# Patient Record
Sex: MALE | Race: WHITE | Employment: FULL TIME | URBAN - METROPOLITAN AREA
[De-identification: names, ages, dates, MRNs, and addresses within clinical notes are randomized per-mention and may not be internally consistent; named-entity substitution may affect disease eponyms.]

---

## 2021-09-17 ENCOUNTER — OFFICE VISIT (OUTPATIENT)
Dept: FAMILY MEDICINE CLINIC | Age: 46
End: 2021-09-17
Payer: COMMERCIAL

## 2021-09-17 ENCOUNTER — HOSPITAL ENCOUNTER (OUTPATIENT)
Age: 46
Discharge: HOME | End: 2021-09-17
Payer: COMMERCIAL

## 2021-09-17 VITALS
HEIGHT: 68 IN | WEIGHT: 287 LBS | TEMPERATURE: 97.3 F | HEART RATE: 74 BPM | OXYGEN SATURATION: 98 % | BODY MASS INDEX: 43.5 KG/M2 | DIASTOLIC BLOOD PRESSURE: 96 MMHG | RESPIRATION RATE: 18 BRPM | SYSTOLIC BLOOD PRESSURE: 148 MMHG

## 2021-09-17 DIAGNOSIS — M25.561 CHRONIC PAIN OF RIGHT KNEE: ICD-10-CM

## 2021-09-17 DIAGNOSIS — G89.29 CHRONIC PAIN OF RIGHT KNEE: ICD-10-CM

## 2021-09-17 DIAGNOSIS — Z11.4 SCREENING FOR HUMAN IMMUNODEFICIENCY VIRUS: ICD-10-CM

## 2021-09-17 DIAGNOSIS — Z11.59 NEED FOR HEPATITIS C SCREENING TEST: ICD-10-CM

## 2021-09-17 DIAGNOSIS — L30.9 ECZEMA, UNSPECIFIED TYPE: ICD-10-CM

## 2021-09-17 DIAGNOSIS — I10 ESSENTIAL HYPERTENSION: ICD-10-CM

## 2021-09-17 DIAGNOSIS — I10 ESSENTIAL HYPERTENSION: Primary | ICD-10-CM

## 2021-09-17 LAB
ALBUMIN SERPL-MCNC: 4.2 G/DL (ref 3.5–5.2)
ALP BLD-CCNC: 85 U/L (ref 40–129)
ALT SERPL-CCNC: 43 U/L (ref 0–40)
ANION GAP SERPL CALCULATED.3IONS-SCNC: 9 MMOL/L (ref 7–16)
AST SERPL-CCNC: 35 U/L (ref 0–39)
BASOPHILS ABSOLUTE: 0.04 E9/L (ref 0–0.2)
BASOPHILS RELATIVE PERCENT: 0.5 % (ref 0–2)
BILIRUB SERPL-MCNC: <0.2 MG/DL (ref 0–1.2)
BUN BLDV-MCNC: 13 MG/DL (ref 6–20)
CALCIUM SERPL-MCNC: 9 MG/DL (ref 8.6–10.2)
CHLORIDE BLD-SCNC: 106 MMOL/L (ref 98–107)
CHOLESTEROL, TOTAL: 103 MG/DL (ref 0–199)
CO2: 25 MMOL/L (ref 22–29)
CREAT SERPL-MCNC: 0.8 MG/DL (ref 0.7–1.2)
EOSINOPHILS ABSOLUTE: 0.28 E9/L (ref 0.05–0.5)
EOSINOPHILS RELATIVE PERCENT: 3.7 % (ref 0–6)
GFR AFRICAN AMERICAN: >60
GFR NON-AFRICAN AMERICAN: >60 ML/MIN/1.73
GLUCOSE BLD-MCNC: 98 MG/DL (ref 74–99)
HBA1C MFR BLD: 5.3 % (ref 4–5.6)
HCT VFR BLD CALC: 45.1 % (ref 37–54)
HDLC SERPL-MCNC: 44 MG/DL
HEMOGLOBIN: 15 G/DL (ref 12.5–16.5)
IMMATURE GRANULOCYTES #: 0.03 E9/L
IMMATURE GRANULOCYTES %: 0.4 % (ref 0–5)
LDL CHOLESTEROL CALCULATED: 42 MG/DL (ref 0–99)
LYMPHOCYTES ABSOLUTE: 1.94 E9/L (ref 1.5–4)
LYMPHOCYTES RELATIVE PERCENT: 25.8 % (ref 20–42)
MCH RBC QN AUTO: 32.1 PG (ref 26–35)
MCHC RBC AUTO-ENTMCNC: 33.3 % (ref 32–34.5)
MCV RBC AUTO: 96.6 FL (ref 80–99.9)
MONOCYTES ABSOLUTE: 1.04 E9/L (ref 0.1–0.95)
MONOCYTES RELATIVE PERCENT: 13.8 % (ref 2–12)
NEUTROPHILS ABSOLUTE: 4.19 E9/L (ref 1.8–7.3)
NEUTROPHILS RELATIVE PERCENT: 55.8 % (ref 43–80)
PDW BLD-RTO: 13.7 FL (ref 11.5–15)
PLATELET # BLD: 261 E9/L (ref 130–450)
PMV BLD AUTO: 10.3 FL (ref 7–12)
POTASSIUM SERPL-SCNC: 4 MMOL/L (ref 3.5–5)
RBC # BLD: 4.67 E12/L (ref 3.8–5.8)
SODIUM BLD-SCNC: 140 MMOL/L (ref 132–146)
TOTAL PROTEIN: 7.1 G/DL (ref 6.4–8.3)
TRIGL SERPL-MCNC: 85 MG/DL (ref 0–149)
VLDLC SERPL CALC-MCNC: 17 MG/DL
WBC # BLD: 7.5 E9/L (ref 4.5–11.5)

## 2021-09-17 PROCEDURE — 80061 LIPID PANEL: CPT

## 2021-09-17 PROCEDURE — 83036 HEMOGLOBIN GLYCOSYLATED A1C: CPT

## 2021-09-17 PROCEDURE — 86803 HEPATITIS C AB TEST: CPT

## 2021-09-17 PROCEDURE — 36415 COLL VENOUS BLD VENIPUNCTURE: CPT

## 2021-09-17 PROCEDURE — 85025 COMPLETE CBC W/AUTO DIFF WBC: CPT

## 2021-09-17 PROCEDURE — 99204 OFFICE O/P NEW MOD 45 MIN: CPT | Performed by: FAMILY MEDICINE

## 2021-09-17 PROCEDURE — G8427 DOCREV CUR MEDS BY ELIG CLIN: HCPCS | Performed by: FAMILY MEDICINE

## 2021-09-17 PROCEDURE — 1036F TOBACCO NON-USER: CPT | Performed by: FAMILY MEDICINE

## 2021-09-17 PROCEDURE — G8417 CALC BMI ABV UP PARAM F/U: HCPCS | Performed by: FAMILY MEDICINE

## 2021-09-17 PROCEDURE — 86703 HIV-1/HIV-2 1 RESULT ANTBDY: CPT

## 2021-09-17 PROCEDURE — 80053 COMPREHEN METABOLIC PANEL: CPT

## 2021-09-17 RX ORDER — CLOBETASOL PROPIONATE 0.5 MG/ML
LOTION TOPICAL
Qty: 118 ML | Refills: 3 | Status: SHIPPED
Start: 2021-09-17 | End: 2021-09-17 | Stop reason: SDUPTHER

## 2021-09-17 RX ORDER — CLOBETASOL PROPIONATE 0.5 MG/G
AEROSOL, FOAM TOPICAL
Qty: 100 G | Refills: 3 | Status: SHIPPED
Start: 2021-09-17 | End: 2021-09-17 | Stop reason: SDUPTHER

## 2021-09-17 RX ORDER — CLOBETASOL PROPIONATE 0.5 MG/ML
LOTION TOPICAL
Qty: 118 ML | Refills: 3 | Status: SHIPPED
Start: 2021-09-17 | End: 2022-01-05

## 2021-09-17 RX ORDER — LISINOPRIL 40 MG/1
40 TABLET ORAL DAILY
COMMUNITY
End: 2021-09-17

## 2021-09-17 RX ORDER — ASPIRIN 81 MG/1
81 TABLET ORAL DAILY
COMMUNITY

## 2021-09-17 RX ORDER — HYDROCHLOROTHIAZIDE 25 MG/1
25 TABLET ORAL DAILY
COMMUNITY
End: 2021-09-17

## 2021-09-17 RX ORDER — CLOBETASOL PROPIONATE 0.5 MG/G
AEROSOL, FOAM TOPICAL
Qty: 100 G | Refills: 3 | Status: SHIPPED
Start: 2021-09-17 | End: 2022-03-24

## 2021-09-17 RX ORDER — ATORVASTATIN CALCIUM 10 MG/1
10 TABLET, FILM COATED ORAL DAILY
COMMUNITY
End: 2021-10-04 | Stop reason: SDUPTHER

## 2021-09-17 RX ORDER — LISINOPRIL AND HYDROCHLOROTHIAZIDE 25; 20 MG/1; MG/1
1 TABLET ORAL 2 TIMES DAILY
Qty: 180 TABLET | Refills: 1 | Status: SHIPPED
Start: 2021-09-17 | End: 2021-11-22

## 2021-09-17 RX ORDER — LISINOPRIL AND HYDROCHLOROTHIAZIDE 25; 20 MG/1; MG/1
1 TABLET ORAL 2 TIMES DAILY
Qty: 180 TABLET | Refills: 1 | Status: SHIPPED
Start: 2021-09-17 | End: 2021-09-17 | Stop reason: SDUPTHER

## 2021-09-17 SDOH — ECONOMIC STABILITY: FOOD INSECURITY: WITHIN THE PAST 12 MONTHS, YOU WORRIED THAT YOUR FOOD WOULD RUN OUT BEFORE YOU GOT MONEY TO BUY MORE.: NEVER TRUE

## 2021-09-17 SDOH — ECONOMIC STABILITY: FOOD INSECURITY: WITHIN THE PAST 12 MONTHS, THE FOOD YOU BOUGHT JUST DIDN'T LAST AND YOU DIDN'T HAVE MONEY TO GET MORE.: NEVER TRUE

## 2021-09-17 ASSESSMENT — SOCIAL DETERMINANTS OF HEALTH (SDOH): HOW HARD IS IT FOR YOU TO PAY FOR THE VERY BASICS LIKE FOOD, HOUSING, MEDICAL CARE, AND HEATING?: NOT HARD AT ALL

## 2021-09-17 ASSESSMENT — PATIENT HEALTH QUESTIONNAIRE - PHQ9
2. FEELING DOWN, DEPRESSED OR HOPELESS: 0
SUM OF ALL RESPONSES TO PHQ QUESTIONS 1-9: 0
1. LITTLE INTEREST OR PLEASURE IN DOING THINGS: 0
SUM OF ALL RESPONSES TO PHQ QUESTIONS 1-9: 0
SUM OF ALL RESPONSES TO PHQ9 QUESTIONS 1 & 2: 0
SUM OF ALL RESPONSES TO PHQ QUESTIONS 1-9: 0

## 2021-09-17 ASSESSMENT — ENCOUNTER SYMPTOMS
SORE THROAT: 0
CHEST TIGHTNESS: 0
CONSTIPATION: 0
NAUSEA: 0
VOMITING: 0
DIARRHEA: 0
BACK PAIN: 0
WHEEZING: 0
COUGH: 0
SHORTNESS OF BREATH: 0
TROUBLE SWALLOWING: 0

## 2021-09-17 NOTE — PROGRESS NOTES
[address]  Precepting Note    Subjective:  new pt    HTN  Elevated today  Compliant with meds    Eczema  Used to be clobetasol but was on MTX while in care home    Remote T8 fx    ROS otherwise negative     Past medical, surgical, family and social history were reviewed, non-contributory, and unchanged unless otherwise stated. Objective:    BP (!) 148/96   Pulse 74   Temp 97.3 °F (36.3 °C) (Temporal)   Resp 18   Ht 5' 8\" (1.727 m)   Wt 287 lb (130.2 kg)   SpO2 98%   BMI 43.64 kg/m²     Exam is as noted by resident with the following changes, additions or corrections:    General:  NAD; alert & oriented x 3   Heart:  RRR, no murmurs, gallops, or rubs. Lungs:  CTA bilaterally, no wheeze, rales or rhonchi  Abd: bowel sounds present, nontender, nondistended, no masses  Extrem:  No clubbing, cyanosis, or edema    Assessment/Plan:  HTN   Change to Prinzide bid    Labs   Eczema    Restart clobetasol  HM   Check labs  RTO    2-3 mo/prn      Attending Physician Statement  I have reviewed the chart, including any radiology or labs. I have discussed the case, including pertinent history and exam findings with the resident. I agree with the assessment, plan and orders as documented by the resident. Please refer to the resident note for additional information.       Electronically signed by Eliezer Olivera MD on 9/17/2021 at 10:26 AM

## 2021-09-17 NOTE — PROGRESS NOTES
9/17/2021    Jennifer Bustos is a 51 y.o. male here for:    HPI:    Establish:    History of T8 Compression fracture, Ankle Surgery     HTN: On Lisinopril and HCTZ. Has been tolerating them. No CP or SOB. No vision issues. Having a lot of anxiety and stress about being back in general life after incarceration. Eczema: Was on MTX when inside. Used to be on Clobetasol topicals with very good relief. Uses lotion on skin and foam on scalp. Worse on the back right now. Right Knee Pain: Has been somewhat worse since gaining weight. Occasional popping feeling. No instability or weakness. Pain is minimal and tolerable. Would like a knee sleeve. Screening: Due for labs. Meets new Colon Ca screening guidelines. Wants to think about it, leaning toward CScope. COVID Vaccine Status: 3 doses Moderna  BP Readings from Last 3 Encounters:   09/17/21 (!) 148/96   08/02/11 126/80     Current Outpatient Medications   Medication Sig Dispense Refill    aspirin 81 MG EC tablet Take 81 mg by mouth daily      atorvastatin (LIPITOR) 10 MG tablet Take 10 mg by mouth daily      lisinopril-hydroCHLOROthiazide (PRINZIDE;ZESTORETIC) 20-25 MG per tablet Take 1 tablet by mouth 2 times daily 180 tablet 1    clobetasol propionate 0.05 % LOTN lotion Apply topically to affected area 2 times daily 118 mL 3    clobetasol (OLUX) 0.05 % foam Apply topically to scalp 2 times daily. 100 g 3    Elastic Bandages & Supports (NEOPRENE KNEE BRACE) MISC Apply to right knee daily 1 each 0     No current facility-administered medications for this visit.       Allergies   Allergen Reactions    Morphine Nausea And Vomiting    Penicillins Rash       Past Medical & Surgical History:      Diagnosis Date    Chronic back pain 8/2/2011    Eczema     Essential hypertension      Past Surgical History:   Procedure Laterality Date    ANKLE SURGERY  2009    ORIF on Rt ankle       Family History:      Problem Relation Age of Onset    High Blood Pressure Mother     Diabetes Father     Heart Disease Father     High Blood Pressure Father     COPD Father     Diabetes Brother     Diabetes Paternal Grandmother     Cancer Paternal Grandmother         Unknown    Prostate Cancer Paternal Grandfather        Social History:  Social History     Tobacco Use    Smoking status: Former Smoker     Packs/day: 2.00     Years: 7.00     Pack years: 14.00     Types: Cigarettes     Quit date: 2011     Years since quitting: 10.7    Smokeless tobacco: Never Used   Substance Use Topics    Alcohol use: No     Comment: Quit since 2007       Immunization History   Administered Date(s) Administered    COVID-19, Moderna, PF, 100mcg/0.5mL 03/24/2021, 04/20/2021, 09/15/2021       Review of Systems   Constitutional: Negative for chills and fever. HENT: Negative for sore throat and trouble swallowing. Respiratory: Negative for cough, chest tightness, shortness of breath and wheezing. Cardiovascular: Negative for chest pain, palpitations and leg swelling. Gastrointestinal: Negative for constipation, diarrhea, nausea and vomiting. Musculoskeletal: Positive for arthralgias (Right knee). Negative for back pain, gait problem and neck pain. Skin: Negative for wound. Neurological: Negative for dizziness, light-headedness and headaches. Psychiatric/Behavioral: The patient is not nervous/anxious. VS:  BP (!) 148/96   Pulse 74   Temp 97.3 °F (36.3 °C) (Temporal)   Resp 18   Ht 5' 8\" (1.727 m)   Wt 287 lb (130.2 kg)   SpO2 98%   BMI 43.64 kg/m²     Physical Exam  Vitals reviewed. Constitutional:       General: He is not in acute distress. Appearance: Normal appearance. He is obese. He is not ill-appearing. HENT:      Head: Normocephalic and atraumatic. Cardiovascular:      Rate and Rhythm: Normal rate and regular rhythm. Pulses: Normal pulses. Heart sounds: Normal heart sounds. No murmur heard.      Pulmonary:      Effort: Pulmonary effort is normal. No respiratory distress. Breath sounds: No wheezing, rhonchi or rales. Musculoskeletal:         General: No swelling. Normal range of motion. Cervical back: Normal range of motion. No rigidity. Right knee: Crepitus (Mild) present. No effusion or bony tenderness. Normal range of motion. No tenderness. Right lower leg: No edema. Left lower leg: No edema. Skin:     General: Skin is warm and dry. Neurological:      General: No focal deficit present. Mental Status: He is alert. Motor: No weakness. Assessment/Plan:    1. Essential hypertension  Elevated in office today  REcheckd and improved  Will increase HCTZ, Prinzide BID  Check labs  - lisinopril-hydroCHLOROthiazide (PRINZIDE;ZESTORETIC) 20-25 MG per tablet; Take 1 tablet by mouth 2 times daily  Dispense: 180 tablet; Refill: 1  - COMPREHENSIVE METABOLIC PANEL; Future  - LIPID PANEL; Future  - HEMOGLOBIN A1C; Future  - CBC Auto Differential; Future    2. Eczema, unspecified type  Restart topicals  Consider Derm referral if he would like if no response  Previously on Enbrel  - clobetasol propionate 0.05 % LOTN lotion; Apply topically to affected area 2 times daily  Dispense: 118 mL; Refill: 3  - clobetasol (OLUX) 0.05 % foam; Apply topically to scalp 2 times daily. Dispense: 100 g; Refill: 3    3. BMI 40.0-44.9, adult ([facility] [address]  Counseled on weight  He is planning to increase activity now that he has a new job  - LIPID PANEL; Future  - HEMOGLOBIN A1C; Future    4. Need for hepatitis C screening test  Screening  - HEPATITIS C ANTIBODY; Future    5. Screening for human immunodeficiency virus  Screening  Recent incarceration but no specific risk factors  - HIV Screen; Future    6. Chronic pain of right knee  Use sleeve  If worsening will consider imaging  Given exercises for knee  - Elastic Bandages & Supports (NEOPRENE KNEE BRACE) MISC; Apply to right knee daily  Dispense: 1 each;  Refill: 0      Follow up:  2 months    Patient agrees with the above stated plan.       Lauren Cheema MD  PGY-3 Family Medicine

## 2021-09-17 NOTE — PATIENT INSTRUCTIONS
about 12 inches off the floor. Hold for about 6 seconds, then slowly lower your leg. 5. Do 8 to 12 repetitions. Straight-leg raises to the back   1. Lie on your stomach, and lift your leg straight up behind you (toward the ceiling). 2. Lift your toes about 6 inches off the floor, hold for about 6 seconds, then lower slowly. 3. Do 8 to 12 repetitions. Straight-leg raises to the inside   1. Lie on the side of your body with the injured leg. 2. You can either prop your other (good) leg up on a chair, or you can bend your good knee and put that foot in front of your injured knee. Do not drop your hip back. 3. Tighten the muscles on the front of your thigh to straighten your injured knee. 4. Keep your kneecap pointing forward, and lift your whole leg up toward the ceiling about 6 inches. Hold for about 6 seconds, then lower slowly. 5. Do 8 to 12 repetitions. Heel dig bridging   1. Lie on your back with both knees bent and your ankles bent so that only your heels are digging into the floor. Your knees should be bent about 90 degrees. 2. Then push your heels into the floor, squeeze your buttocks, and lift your hips off the floor until your shoulders, hips, and knees are all in a straight line. 3. Hold for about 6 seconds as you continue to breathe normally, and then slowly lower your hips back down to the floor and rest for up to 10 seconds. 4. Do 8 to 12 repetitions. Hamstring curls   1. Lie on your stomach with your knees straight. If your kneecap is uncomfortable, roll up a washcloth and put it under your leg just above your kneecap. 2. Lift the foot of your injured leg by bending the knee so that you bring the foot up toward your buttock. If this motion hurts, try it without bending your knee quite as far. This may help you avoid any painful motion. 3. Slowly lower your leg back to the floor. 4. Do 8 to 12 repetitions.   5. With permission from your doctor or physical therapist, you may also want to add a cuff weight to your ankle (not more than 5 pounds). With weight, you do not have to lift your leg more than 12 inches to get a hamstring workout. Shallow standing knee bends   Do this exercise only if you have very little pain; if you have no clicking, locking, or giving way if you have an injured knee; and if it does not hurt while you are doing 8 to 12 repetitions. 1. Stand with your hands lightly resting on a counter or chair in front of you. Put your feet shoulder-width apart. 2. Slowly bend your knees so that you squat down like you are going to sit in a chair. Make sure your knees do not go in front of your toes. 3. Lower yourself about 6 inches. Your heels should remain on the floor at all times. 4. Rise slowly to a standing position. Heel raises   1. Stand with your feet a few inches apart, with your hands lightly resting on a counter or chair in front of you. 2. Slowly raise your heels off the floor while keeping your knees straight. 3. Hold for about 6 seconds, then slowly lower your heels to the floor. 4. Do 8 to 12 repetitions several times during the day. Follow-up care is a key part of your treatment and safety. Be sure to make and go to all appointments, and call your doctor if you are having problems. It's also a good idea to know your test results and keep a list of the medicines you take. Where can you learn more? Go to [email or web address] and sign in to your [employer] account. Enter W641 in the [facility] box to learn more about \"Knee: Exercises. \"     If you do not have an account, please click on the \"Sign Up Now\" link. Current as of: November 16, 2020               Content Version: 12.9  © 7565-3144 [employer]. Care instructions adapted under license by [employer].  If you have questions about a medical condition or this instruction, always ask your healthcare professional. Javed Hernandez any warranty or liability for your use of this information.

## 2021-09-20 LAB
HEPATITIS C ANTIBODY INTERPRETATION: NORMAL
HIV-1 AND HIV-2 ANTIBODIES: NORMAL

## 2021-10-04 ENCOUNTER — TELEPHONE (OUTPATIENT)
Dept: FAMILY MEDICINE CLINIC | Age: 46
End: 2021-10-04

## 2021-10-04 ENCOUNTER — OFFICE VISIT (OUTPATIENT)
Dept: PRIMARY CARE CLINIC | Age: 46
End: 2021-10-04
Payer: COMMERCIAL

## 2021-10-04 VITALS
BODY MASS INDEX: 40.92 KG/M2 | HEIGHT: 68 IN | WEIGHT: 270 LBS | SYSTOLIC BLOOD PRESSURE: 142 MMHG | OXYGEN SATURATION: 95 % | HEART RATE: 87 BPM | DIASTOLIC BLOOD PRESSURE: 98 MMHG | TEMPERATURE: 97.5 F

## 2021-10-04 DIAGNOSIS — J02.9 PHARYNGITIS, UNSPECIFIED ETIOLOGY: Primary | ICD-10-CM

## 2021-10-04 LAB — S PYO AG THROAT QL: NORMAL

## 2021-10-04 PROCEDURE — 99212 OFFICE O/P EST SF 10 MIN: CPT | Performed by: STUDENT IN AN ORGANIZED HEALTH CARE EDUCATION/TRAINING PROGRAM

## 2021-10-04 PROCEDURE — 87880 STREP A ASSAY W/OPTIC: CPT | Performed by: STUDENT IN AN ORGANIZED HEALTH CARE EDUCATION/TRAINING PROGRAM

## 2021-10-04 RX ORDER — ATORVASTATIN CALCIUM 10 MG/1
10 TABLET, FILM COATED ORAL DAILY
Qty: 30 TABLET | Refills: 0 | Status: SHIPPED
Start: 2021-10-04 | End: 2021-11-22 | Stop reason: SDUPTHER

## 2021-10-04 RX ORDER — DOXYCYCLINE HYCLATE 100 MG
100 TABLET ORAL 2 TIMES DAILY
Qty: 14 TABLET | Refills: 0 | Status: SHIPPED | OUTPATIENT
Start: 2021-10-04 | End: 2021-10-11

## 2021-10-04 NOTE — TELEPHONE ENCOUNTER
Pt called in stating he believes he has strep throat. Red spots in his throat and difficulty swallowing. Was asking if Dr can call medication in to help him fight this.          [facility] on [address] [place] [address]

## 2021-10-04 NOTE — LETTER
[address] [facility] [place]  [address] [place] [address]  Phone: [phone]  Fax: [phone]    Lulu Núñez MD        October 4, 2021     Patient: Bj Archer   YOB: 1975   Date of Visit: 10/4/2021       To Whom It May Concern: It is my medical opinion that Bj Archer should be excused from work on 10/4/2021 and may return 10/5/2021. If you have any questions or concerns, please don't hesitate to call.     Sincerely,        Lulu Núñez MD

## 2021-10-04 NOTE — TELEPHONE ENCOUNTER
Agree with walk in thank you.     Electronically signed by Patrice Cabrera MD on 10/4/2021 at 12:46 PM

## 2021-10-05 NOTE — PROGRESS NOTES
Chief Complaint       Pharyngitis (x 3 days)      History of Present Illness   Source of history provided by:  patient. Lennox Banegas is a 51 y.o. old male presenting to the walk in clinic for evaluation of sore throat for 3 days. Reports throat is now so painful that it is difficult to swallow. Has been drinking plenty of fluids. Felt feverish last night but did not take his temperature. Taking ibuprofen. Denies congestion, cough, SOA. Denies contact with individuals with known COVID-19 infection or under investigation for COVID-19 infection. Pt has been vaccinated for COVID-19. ROS    Unless otherwise stated in this report or unable to obtain because of the patient's clinical or mental status as evidenced by the medical record, this patients's positive and negative responses for Review of Systems, constitutional, psych, eyes, ENT, cardiovascular, respiratory, gastrointestinal, neurological, genitourinary, musculoskeletal, integument systems and systems related to the presenting problem are either stated in the preceding or were not pertinent or were negative for the symptoms and/or complaints related to the medical problem. Past Medical History:  has a past medical history of Chronic back pain, Eczema, and Essential hypertension. Past Surgical History:  has a past surgical history that includes Ankle surgery (2009). Social History:  reports that he quit smoking about 10 years ago. His smoking use included cigarettes. He has a 14.00 pack-year smoking history. He has never used smokeless tobacco. He reports previous drug use. He reports that he does not drink alcohol. Family History: family history includes COPD in his father; Cancer in his paternal grandmother; Diabetes in his brother, father, and paternal grandmother; Heart Disease in his father; High Blood Pressure in his father and mother; Prostate Cancer in his paternal grandfather.    Allergies: Morphine and Penicillins    Physical Exam VS:  BP (!) 142/98   Pulse 87   Temp 97.5 °F (36.4 °C)   Ht 5' 8\" (1.727 m)   Wt 270 lb (122.5 kg)   SpO2 95%   BMI 41.05 kg/m²    Oxygen Saturation Interpretation: Normal.    Constitutional:  Alert, development consistent with age. Ears:  External Ears: Bilateral pinna normal. TMs  without erythema or perforation bilaterally. Canals normal bilaterally without swelling or exudate  Nose:  no congestion of the nasal mucosa. There is no injection to middle turbinates bilaterally. Throat:  posterior pharyngeal erythema with bilateral exudates  Neck:  Supple. There is anterior cervical adenopathy. Lungs: CTAB without wheezes, rales, or rhonchi  Heart:  Regular rate and rhythm, normal heart sounds, without pathological murmurs, ectopy, gallops, or rubs. Skin:  Normal turgor. Warm, dry, without visible rash. Neurological:  Alert and oriented. Motor functions intact. Responds to verbal commands. Lab / Imaging Results   (All laboratory and radiology results have been personally reviewed by myself)  Labs:  Results for orders placed or performed in visit on 10/04/21   POCT rapid strep A   Result Value Ref Range    Strep A Ag None Detected None Detected       Imaging: All Radiology results interpreted by Radiologist unless otherwise noted. Assessment / Plan     Impression(s):  Rhea Beverly was seen today for pharyngitis. Diagnoses and all orders for this visit:    Pharyngitis, unspecified etiology  -     POCT rapid strep A  -     doxycycline hyclate (VIBRA-TABS) 100 MG tablet; Take 1 tablet by mouth 2 times daily for 7 days  -     Lidocaine 4 % SOLN; Apply 1 spray topically three times daily    Other orders  -     atorvastatin (LIPITOR) 10 MG tablet; Take 1 tablet by mouth daily      Raid strep negative but with physical exam findings and history, will treat for strep pharyngitis. Patient with PCN allergy. Disposition:  Disposition: Discharge to home    Pt advised that he likely has strep. Increase fluids and rest. Script written for doxy and lidocaine throat spray; side effects discussed. Additional symptomatic relief discussed. PCP in 5-7 days if symptoms persist. ED sooner if symptoms worsen or change. Red flag symptoms discussed. Pt is in agreement with this care plan. All questions answered.     Rosanna Escoto MD

## 2021-10-08 VITALS
HEART RATE: 81 BPM | HEIGHT: 69 IN | RESPIRATION RATE: 18 BRPM | TEMPERATURE: 97.9 F | SYSTOLIC BLOOD PRESSURE: 131 MMHG | DIASTOLIC BLOOD PRESSURE: 60 MMHG | OXYGEN SATURATION: 95 % | BODY MASS INDEX: 40.73 KG/M2 | WEIGHT: 275 LBS

## 2021-10-08 ASSESSMENT — PAIN SCALES - GENERAL: PAINLEVEL_OUTOF10: 8

## 2021-10-08 ASSESSMENT — PAIN DESCRIPTION - ONSET: ONSET: ON-GOING

## 2021-10-08 ASSESSMENT — PAIN DESCRIPTION - PROGRESSION: CLINICAL_PROGRESSION: GRADUALLY WORSENING

## 2021-10-08 ASSESSMENT — PAIN DESCRIPTION - PAIN TYPE: TYPE: ACUTE PAIN

## 2021-10-08 ASSESSMENT — PAIN DESCRIPTION - FREQUENCY: FREQUENCY: CONTINUOUS

## 2021-10-08 ASSESSMENT — PAIN DESCRIPTION - LOCATION: LOCATION: ABDOMEN

## 2021-10-08 ASSESSMENT — PAIN DESCRIPTION - ORIENTATION: ORIENTATION: LEFT;UPPER

## 2021-10-08 ASSESSMENT — PAIN DESCRIPTION - DESCRIPTORS: DESCRIPTORS: SORE;ACHING

## 2021-10-09 ENCOUNTER — HOSPITAL ENCOUNTER (EMERGENCY)
Age: 46
Discharge: LEFT BEFORE DISCHARGE | End: 2021-10-09
Payer: COMMERCIAL

## 2021-10-09 ENCOUNTER — HOSPITAL ENCOUNTER (EMERGENCY)
Age: 46
Discharge: LEFT BEFORE DISCHARGE | End: 2021-10-09
Attending: STUDENT IN AN ORGANIZED HEALTH CARE EDUCATION/TRAINING PROGRAM
Payer: COMMERCIAL

## 2021-10-09 VITALS
HEIGHT: 69 IN | HEART RATE: 85 BPM | DIASTOLIC BLOOD PRESSURE: 86 MMHG | TEMPERATURE: 98.8 F | RESPIRATION RATE: 16 BRPM | WEIGHT: 275 LBS | SYSTOLIC BLOOD PRESSURE: 152 MMHG | BODY MASS INDEX: 40.73 KG/M2 | OXYGEN SATURATION: 91 %

## 2021-10-09 LAB
ANION GAP SERPL CALCULATED.3IONS-SCNC: 3 MMOL/L (ref 7–16)
BASOPHILS ABSOLUTE: 0.05 E9/L (ref 0–0.2)
BASOPHILS RELATIVE PERCENT: 0.5 % (ref 0–2)
BILIRUBIN URINE: NEGATIVE
BLOOD, URINE: NEGATIVE
BUN BLDV-MCNC: 36 MG/DL (ref 6–20)
CALCIUM SERPL-MCNC: 9.4 MG/DL (ref 8.6–10.2)
CHLORIDE BLD-SCNC: 99 MMOL/L (ref 98–107)
CLARITY: CLEAR
CO2: 33 MMOL/L (ref 22–29)
COLOR: YELLOW
CREAT SERPL-MCNC: 0.9 MG/DL (ref 0.7–1.2)
EOSINOPHILS ABSOLUTE: 0.2 E9/L (ref 0.05–0.5)
EOSINOPHILS RELATIVE PERCENT: 2.2 % (ref 0–6)
GFR AFRICAN AMERICAN: >60
GFR NON-AFRICAN AMERICAN: >60 ML/MIN/1.73
GLUCOSE BLD-MCNC: 132 MG/DL (ref 74–99)
GLUCOSE URINE: NEGATIVE MG/DL
HCT VFR BLD CALC: 47.3 % (ref 37–54)
HEMOGLOBIN: 15.8 G/DL (ref 12.5–16.5)
IMMATURE GRANULOCYTES #: 0.03 E9/L
IMMATURE GRANULOCYTES %: 0.3 % (ref 0–5)
KETONES, URINE: NEGATIVE MG/DL
LACTIC ACID: 0.8 MMOL/L (ref 0.5–2.2)
LEUKOCYTE ESTERASE, URINE: NEGATIVE
LIPASE: 21 U/L (ref 13–60)
LYMPHOCYTES ABSOLUTE: 2.76 E9/L (ref 1.5–4)
LYMPHOCYTES RELATIVE PERCENT: 29.8 % (ref 20–42)
MCH RBC QN AUTO: 31.5 PG (ref 26–35)
MCHC RBC AUTO-ENTMCNC: 33.4 % (ref 32–34.5)
MCV RBC AUTO: 94.2 FL (ref 80–99.9)
MONOCYTES ABSOLUTE: 0.65 E9/L (ref 0.1–0.95)
MONOCYTES RELATIVE PERCENT: 7 % (ref 2–12)
NEUTROPHILS ABSOLUTE: 5.58 E9/L (ref 1.8–7.3)
NEUTROPHILS RELATIVE PERCENT: 60.2 % (ref 43–80)
NITRITE, URINE: NEGATIVE
PDW BLD-RTO: 13.2 FL (ref 11.5–15)
PH UA: 5 (ref 5–9)
PLATELET # BLD: 316 E9/L (ref 130–450)
PMV BLD AUTO: 9.8 FL (ref 7–12)
POTASSIUM SERPL-SCNC: 4.5 MMOL/L (ref 3.5–5)
PROTEIN UA: NEGATIVE MG/DL
RBC # BLD: 5.02 E12/L (ref 3.8–5.8)
SODIUM BLD-SCNC: 135 MMOL/L (ref 132–146)
SPECIFIC GRAVITY UA: 1.02 (ref 1–1.03)
UROBILINOGEN, URINE: 0.2 E.U./DL
WBC # BLD: 9.3 E9/L (ref 4.5–11.5)

## 2021-10-09 PROCEDURE — 83690 ASSAY OF LIPASE: CPT

## 2021-10-09 PROCEDURE — 83605 ASSAY OF LACTIC ACID: CPT

## 2021-10-09 PROCEDURE — 80048 BASIC METABOLIC PNL TOTAL CA: CPT

## 2021-10-09 PROCEDURE — 4500000002 HC ER NO CHARGE

## 2021-10-09 PROCEDURE — 99283 EMERGENCY DEPT VISIT LOW MDM: CPT

## 2021-10-09 PROCEDURE — 36415 COLL VENOUS BLD VENIPUNCTURE: CPT

## 2021-10-09 PROCEDURE — 81003 URINALYSIS AUTO W/O SCOPE: CPT

## 2021-10-09 PROCEDURE — 87088 URINE BACTERIA CULTURE: CPT

## 2021-10-09 PROCEDURE — 85025 COMPLETE CBC W/AUTO DIFF WBC: CPT

## 2021-10-09 RX ORDER — SODIUM CHLORIDE 0.9 % (FLUSH) 0.9 %
10 SYRINGE (ML) INJECTION PRN
Status: DISCONTINUED | OUTPATIENT
Start: 2021-10-09 | End: 2021-10-09 | Stop reason: HOSPADM

## 2021-10-09 ASSESSMENT — PAIN SCALES - GENERAL: PAINLEVEL_OUTOF10: 8

## 2021-10-09 ASSESSMENT — PAIN DESCRIPTION - ORIENTATION: ORIENTATION: LEFT;UPPER

## 2021-10-09 ASSESSMENT — PAIN DESCRIPTION - LOCATION: LOCATION: ABDOMEN

## 2021-10-09 ASSESSMENT — PAIN DESCRIPTION - DESCRIPTORS: DESCRIPTORS: SHARP;CONSTANT

## 2021-10-09 NOTE — ED NOTES
[address] CONTACT ASSESSMENT NOTE       Department of Emergency Medicine   10/8/21  10:20 PM EDT    Chief Complaint: Abdominal Pain (LUQ, radiating to left flank)    History of Present Illness:   Ruthie Bull is a 51 y.o. male who presents to the ED for left flank pain that began a couple days ago. He states the pain radiates into LUQ of abdomen as well. He does report nausea and vomiting. He denies visible hematuria or urinary complaints. He denies history of kidney stones. Patient denies chest pain or SOB. Focused Physical Exam:  VS:  There were no vitals taken for this visit. General: Alert and in no apparent distress   Back: Mild left CVA tenderness  Abdomen: Tenderness to LUQ, non-distended     Medical History:  has a past medical history of Chronic back pain, Eczema, and Essential hypertension. Surgical History:  has a past surgical history that includes Ankle surgery (2009). Social History:  reports that he quit smoking about 10 years ago. His smoking use included cigarettes. He has a 14.00 pack-year smoking history. He has never used smokeless tobacco. He reports previous drug use. He reports that he does not drink alcohol. Family History: family history includes COPD in his father; Cancer in his paternal grandmother; Diabetes in his brother, father, and paternal grandmother; Heart Disease in his father; High Blood Pressure in his father and mother; Prostate Cancer in his paternal grandfather.     *ALLERGIES*     Morphine and Penicillins     Initial Plan of Care:  Initiate Treatment-Testing, Proceed toTreatment Area When Bed Available for ED Attending/MLP to Continue Care    -----------------END OF FIRST PROVIDER CONTACT ASSESSMENT NOTE--------------  Electronically signed by Augustin Johnson PA-C   DD: 10/8/21         Augustin Johnson PA-C  10/08/21 0962

## 2021-10-09 NOTE — ED NOTES
FIRST PROVIDER CONTACT ASSESSMENT NOTE                                                                                                Department of Emergency Medicine                                                      First Provider Note  10/9/21  11:58 AM EDT  NAME: Emily Remy  : 1975  MRN: [id number]    Chief Complaint: No chief complaint on file. History of Present Illness:   Emily Remy is a 51 y.o. male who presents to the ED for epig pain with urinary freq with nausea x 3-4 days     Focused Physical Exam:  VS:    ED Triage Vitals [10/09/21 1156]   BP Temp Temp Source Pulse Resp SpO2 Height Weight   -- 98.8 °F (37.1 °C) Oral 85 -- 91 % -- --        General: Alert and in no apparent distress. Medical History:  has a past medical history of Chronic back pain, Eczema, and Essential hypertension. Surgical History:  has a past surgical history that includes Ankle surgery (). Social History:  reports that he quit smoking about 10 years ago. His smoking use included cigarettes. He has a 14.00 pack-year smoking history. He has never used smokeless tobacco. He reports previous drug use. He reports that he does not drink alcohol. Family History: family history includes COPD in his father; Cancer in his paternal grandmother; Diabetes in his brother, father, and paternal grandmother; Heart Disease in his father; High Blood Pressure in his father and mother; Prostate Cancer in his paternal grandfather.     Allergies: Morphine and Penicillins     Initial Plan of Care:  Initiate Treatment-Testing, Proceed toTreatment Area When Bed Available for ED Attending/MLP to Continue Care    -------------------------------------------------[address] ASSESSMENT NOTE--------------------------------------------------------  Electronically signed by ANGEL Simpson   DD: 10/9/21       ANGEL Torrez  10/09/21 1159

## 2021-10-11 LAB — URINE CULTURE, ROUTINE: NORMAL

## 2021-10-18 ENCOUNTER — OFFICE VISIT (OUTPATIENT)
Dept: FAMILY MEDICINE CLINIC | Age: 46
End: 2021-10-18
Payer: COMMERCIAL

## 2021-10-18 ENCOUNTER — HOSPITAL ENCOUNTER (OUTPATIENT)
Age: 46
Discharge: HOME | End: 2021-10-20
Payer: COMMERCIAL

## 2021-10-18 ENCOUNTER — HOSPITAL ENCOUNTER (OUTPATIENT)
Dept: GENERAL RADIOLOGY | Age: 46
Discharge: HOME | End: 2021-10-20
Payer: COMMERCIAL

## 2021-10-18 VITALS
WEIGHT: 292 LBS | RESPIRATION RATE: 20 BRPM | OXYGEN SATURATION: 97 % | HEART RATE: 94 BPM | HEIGHT: 68 IN | SYSTOLIC BLOOD PRESSURE: 130 MMHG | DIASTOLIC BLOOD PRESSURE: 80 MMHG | TEMPERATURE: 97.8 F | BODY MASS INDEX: 44.25 KG/M2

## 2021-10-18 DIAGNOSIS — R10.12 LEFT UPPER QUADRANT ABDOMINAL PAIN: ICD-10-CM

## 2021-10-18 DIAGNOSIS — R10.12 LEFT UPPER QUADRANT ABDOMINAL PAIN: Primary | ICD-10-CM

## 2021-10-18 PROCEDURE — G8417 CALC BMI ABV UP PARAM F/U: HCPCS | Performed by: FAMILY MEDICINE

## 2021-10-18 PROCEDURE — 90471 IMMUNIZATION ADMIN: CPT | Performed by: FAMILY MEDICINE

## 2021-10-18 PROCEDURE — 74018 RADEX ABDOMEN 1 VIEW: CPT

## 2021-10-18 PROCEDURE — 99213 OFFICE O/P EST LOW 20 MIN: CPT | Performed by: FAMILY MEDICINE

## 2021-10-18 PROCEDURE — 90674 CCIIV4 VAC NO PRSV 0.5 ML IM: CPT | Performed by: FAMILY MEDICINE

## 2021-10-18 PROCEDURE — G8427 DOCREV CUR MEDS BY ELIG CLIN: HCPCS | Performed by: FAMILY MEDICINE

## 2021-10-18 PROCEDURE — 1036F TOBACCO NON-USER: CPT | Performed by: FAMILY MEDICINE

## 2021-10-18 PROCEDURE — G8482 FLU IMMUNIZE ORDER/ADMIN: HCPCS | Performed by: FAMILY MEDICINE

## 2021-10-18 ASSESSMENT — ENCOUNTER SYMPTOMS
DIARRHEA: 0
COUGH: 0
VOMITING: 0
SHORTNESS OF BREATH: 0
RHINORRHEA: 0

## 2021-10-18 NOTE — PROGRESS NOTES
[facility]    Subjective:  Abdominal pain- upper  L side  Straining at work- felt a pop then started having pain  Sharp and burning  Non radiating  No bowel or bladder disturbance  Now without relief  ROS otherwise negative     Past medical, surgical, family and social history were reviewed, non-contributory, and unchanged unless otherwise stated. Objective:    BP (!) 86/55   Pulse 94   Temp 97.8 °F (36.6 °C)   Resp 20   Ht 5' 8\" (1.727 m)   Wt 292 lb (132.5 kg)   SpO2 97%   BMI 44.40 kg/m²     Exam is as noted by resident    tenderness on palpation    Assessment/Plan:  Abdominal pain  Likely muscular  But given the location of the pain, will obtain x-ray abdomen     Attending Physician Statement  I have reviewed the chart, including any radiology or labs. I have discussed the case, including pertinent history and exam findings with the resident. I agree with the assessment, plan and orders as documented by the resident. Please refer to the resident note for additional information.       Electronically signed by Katty Cho MD on 10/18/2021 at 2:15 PM

## 2021-10-18 NOTE — PROGRESS NOTES
10/18/2021    Camden Menjivar is a 51 y.o. malehere for:    HPI:    CC- abdominal pain  Left upper quadrant abdominal pain  2 weeks ago was intermittent pain  4 days ago at work after exerting himself felt a pop  And since then having abd pain 8/10 in intensity constant pain, burning pain, non radiating   Tender to touch  Last BM today normal   No dysuria or hematuria   No fever or chills  No nausea or vomiting      BP Readings from Last 3 Encounters:   10/18/21 (!) 86/55   10/09/21 (!) 152/86   10/08/21 131/60     Current Outpatient Medications   Medication Sig Dispense Refill    Lidocaine 4 % SOLN Apply 1 spray topically three times daily 100 mL 0    atorvastatin (LIPITOR) 10 MG tablet Take 1 tablet by mouth daily 30 tablet 0    aspirin 81 MG EC tablet Take 81 mg by mouth daily      lisinopril-hydroCHLOROthiazide (PRINZIDE;ZESTORETIC) 20-25 MG per tablet Take 1 tablet by mouth 2 times daily 180 tablet 1    clobetasol propionate 0.05 % LOTN lotion Apply topically to affected area 2 times daily 118 mL 3    clobetasol (OLUX) 0.05 % foam Apply topically to scalp 2 times daily. 100 g 3    Elastic Bandages & Supports (NEOPRENE KNEE BRACE) MISC Apply to right knee daily 1 each 0     No current facility-administered medications for this visit.       Allergies   Allergen Reactions    Morphine Nausea And Vomiting    Penicillins Rash       Past Medical & Surgical History:      Diagnosis Date    Chronic back pain 8/2/2011    Eczema     Essential hypertension      Past Surgical History:   Procedure Laterality Date    ANKLE SURGERY  2009    ORIF on Rt ankle       Family History:      Problem Relation Age of Onset    High Blood Pressure Mother     Diabetes Father     Heart Disease Father     High Blood Pressure Father     COPD Father     Diabetes Brother     Diabetes Paternal Grandmother     Cancer Paternal Grandmother         Unknown    Prostate Cancer Paternal Grandfather        Social History:  Social History     Tobacco Use    Smoking status: Former Smoker     Packs/day: 2.00     Years: 7.00     Pack years: 14.00     Types: Cigarettes     Quit date: 2011     Years since quitting: 10.8    Smokeless tobacco: Never Used   Substance Use Topics    Alcohol use: No     Comment: Quit since 2007       Immunization History   Administered Date(s) Administered    COVID-19, Moderna, PF, 100mcg/0.5mL 03/24/2021, 04/20/2021, 09/15/2021       Review of Systems   Constitutional: Negative for chills and fever. HENT: Negative for congestion and rhinorrhea. Respiratory: Negative for cough and shortness of breath. Cardiovascular: Negative for chest pain and leg swelling. Gastrointestinal: Positive for abdominal pain. Negative for abdominal distention, blood in stool, constipation, diarrhea, nausea and vomiting. Genitourinary: Negative for dysuria and hematuria. Skin: Negative for rash. Neurological: Negative for weakness and numbness. VS:  BP (!) 86/55   Pulse 94   Temp 97.8 °F (36.6 °C)   Resp 20   Ht 5' 8\" (1.727 m)   Wt 292 lb (132.5 kg)   SpO2 97%   BMI 44.40 kg/m²     Physical Exam  Vitals reviewed. Constitutional:       General: He is not in acute distress. Appearance: Normal appearance. He is not ill-appearing. HENT:      Head: Normocephalic and atraumatic. Nose: Nose normal.      Mouth/Throat:      Mouth: Mucous membranes are moist.   Eyes:      General: No scleral icterus. Conjunctiva/sclera: Conjunctivae normal.   Cardiovascular:      Rate and Rhythm: Normal rate and regular rhythm. Heart sounds: Normal heart sounds. No murmur heard. Pulmonary:      Effort: Pulmonary effort is normal. No respiratory distress. Breath sounds: Normal breath sounds. No wheezing or rhonchi. Abdominal:      General: Bowel sounds are normal. There is no distension. Palpations: Abdomen is soft. Tenderness: There is abdominal tenderness (LUQ ).  There is no guarding or rebound. Musculoskeletal:         General: Normal range of motion. Cervical back: Normal range of motion and neck supple. No rigidity. Right lower leg: No edema. Left lower leg: No edema. Skin:     Findings: No rash. Neurological:      General: No focal deficit present. Mental Status: He is alert and oriented to person, place, and time. Psychiatric:         Mood and Affect: Mood normal.         Behavior: Behavior normal.         Assessment/Plan:  Candace Zavaleta was seen today for abdominal pain. Diagnoses and all orders for this visit:    Left upper quadrant abdominal pain  Seems muscular. Conservative/symptomatic treatment, but given the location will have a KUB done. -     XR ABDOMEN (KUB) (SINGLE AP VIEW); Future    Other orders  -     INFLUENZA, MDCK QUADV, 2 YRS AND OLDER, IM, PF, PREFILL SYR OR SDV, 0.5ML (FLUCELVAX QUADV, PF)        Follow up:  As needed    Patient agrees with the above stated plan.         Jennifer Holloway MD  PGY-3 Family Medicine

## 2021-11-02 ASSESSMENT — ENCOUNTER SYMPTOMS
NAUSEA: 0
BLOOD IN STOOL: 0
CONSTIPATION: 0
ABDOMINAL DISTENTION: 0
ABDOMINAL PAIN: 1

## 2021-11-16 ENCOUNTER — OFFICE VISIT (OUTPATIENT)
Dept: PRIMARY CARE CLINIC | Age: 46
End: 2021-11-16
Payer: COMMERCIAL

## 2021-11-16 VITALS
WEIGHT: 270 LBS | DIASTOLIC BLOOD PRESSURE: 86 MMHG | HEIGHT: 68 IN | OXYGEN SATURATION: 97 % | HEART RATE: 104 BPM | TEMPERATURE: 97.5 F | SYSTOLIC BLOOD PRESSURE: 147 MMHG | BODY MASS INDEX: 40.92 KG/M2

## 2021-11-16 DIAGNOSIS — J02.0 STREP PHARYNGITIS: Primary | ICD-10-CM

## 2021-11-16 DIAGNOSIS — Z20.822 ENCOUNTER FOR LABORATORY TESTING FOR COVID-19 VIRUS: ICD-10-CM

## 2021-11-16 LAB
Lab: NORMAL
PERFORMING INSTRUMENT: NORMAL
QC PASS/FAIL: NORMAL
S PYO AG THROAT QL: POSITIVE
SARS-COV-2, POC: NORMAL

## 2021-11-16 PROCEDURE — G8427 DOCREV CUR MEDS BY ELIG CLIN: HCPCS | Performed by: PHYSICIAN ASSISTANT

## 2021-11-16 PROCEDURE — 1036F TOBACCO NON-USER: CPT | Performed by: PHYSICIAN ASSISTANT

## 2021-11-16 PROCEDURE — G8417 CALC BMI ABV UP PARAM F/U: HCPCS | Performed by: PHYSICIAN ASSISTANT

## 2021-11-16 PROCEDURE — 87880 STREP A ASSAY W/OPTIC: CPT | Performed by: PHYSICIAN ASSISTANT

## 2021-11-16 PROCEDURE — 99213 OFFICE O/P EST LOW 20 MIN: CPT | Performed by: PHYSICIAN ASSISTANT

## 2021-11-16 PROCEDURE — G8482 FLU IMMUNIZE ORDER/ADMIN: HCPCS | Performed by: PHYSICIAN ASSISTANT

## 2021-11-16 PROCEDURE — 87426 SARSCOV CORONAVIRUS AG IA: CPT | Performed by: PHYSICIAN ASSISTANT

## 2021-11-16 RX ORDER — BUPRENORPHINE AND NALOXONE 8; 2 MG/1; MG/1
FILM, SOLUBLE BUCCAL; SUBLINGUAL
COMMUNITY
Start: 2021-10-21

## 2021-11-16 RX ORDER — CEFDINIR 300 MG/1
300 CAPSULE ORAL 2 TIMES DAILY
Qty: 20 CAPSULE | Refills: 0 | Status: SHIPPED
Start: 2021-11-16 | End: 2021-11-22

## 2021-11-16 NOTE — PROGRESS NOTES
Chief Complaint       Congestion (x 2 days), Pharyngitis (no sick exposures), Headache, Fatigue, and Nausea      History of Present Illness   Source of history provided by:  patient. Bijal Jacobs is a 51 y.o. old male presenting to the walk in clinic for evaluation of nasal congestion, sore throat, headache, fatigue, and nausea for the past 2 days. Denies any fever, chills, loss of taste or smell, CP, dyspnea, LE edema, abdominal pain, vomiting, rash, or lethargy. Denies any hx of asthma or COPD. Patient is a former smoker. Patient denies recent sick exposures. Patient has been vaccinated for COVID-19. He has tried OTC remedies without relief. ROS    Unless otherwise stated in this report or unable to obtain because of the patient's clinical or mental status as evidenced by the medical record, this patients's positive and negative responses for Review of Systems, constitutional, psych, eyes, ENT, cardiovascular, respiratory, gastrointestinal, neurological, genitourinary, musculoskeletal, integument systems and systems related to the presenting problem are either stated in the preceding or were not pertinent or were negative for the symptoms and/or complaints related to the medical problem. Past Medical History:  has a past medical history of Chronic back pain, Eczema, and Essential hypertension. Past Surgical History:  has a past surgical history that includes Ankle surgery (2009). Social History:  reports that he quit smoking about 10 years ago. His smoking use included cigarettes. He has a 14.00 pack-year smoking history. He has never used smokeless tobacco. He reports previous drug use. He reports that he does not drink alcohol.   Family History: family history includes COPD in his father; Cancer in his paternal grandmother; Diabetes in his brother, father, and paternal grandmother; Heart Disease in his father; High Blood Pressure in his father and mother; Prostate Cancer in his paternal grandfather. Allergies: Morphine and Penicillins    Physical Exam         VS:  BP (!) 147/86   Pulse 104   Temp 97.5 °F (36.4 °C) (Oral)   Ht 5' 8\" (1.727 m)   Wt 270 lb (122.5 kg)   SpO2 97%   BMI 41.05 kg/m²    Oxygen Saturation Interpretation: Normal.    Constitutional:  Alert, development consistent with age. NAD. Head:  NC/NT. Airway patent. Mouth: Posterior pharynx with mild erythema and tonsillar hypertrophy noted. No exudate noted. Neck:  Normal ROM. Supple. No anterior cervical adenopathy noted. Lungs: CTAB without wheezes, rales, or rhonchi. CV:  Regular rate and rhythm, normal heart sounds, without pathological murmurs, ectopy, gallops, or rubs. Skin:  Normal turgor. Warm, dry, without visible rash. Lymphatic: No lymphangitis or adenopathy noted. Neurological:  Oriented. Motor functions intact. Lab / Imaging Results   (All laboratory and radiology results have been personally reviewed by myself)  Labs:  Results for orders placed or performed in visit on 11/16/21   POCT COVID-19, Antigen   Result Value Ref Range    SARS-COV-2, POC Not-Detected Not Detected    Lot Number [id number]     QC Pass/Fail pass     Performing Instrument BD Veritor    POCT rapid strep A   Result Value Ref Range    Strep A Ag Positive (A) None Detected       Imaging: All Radiology results interpreted by Radiologist unless otherwise noted. Assessment / Plan     Impression(s):  Teresa Bill was seen today for congestion, pharyngitis, headache, fatigue and nausea. Diagnoses and all orders for this visit:    Strep pharyngitis  -     POCT COVID-19, Antigen  -     POCT rapid strep A  -     cefdinir (OMNICEF) 300 MG capsule; Take 1 capsule by mouth 2 times daily for 10 days    Encounter for laboratory testing for COVID-19 virus  -     POCT COVID-19, Antigen      Disposition:  Disposition: Discharge to home. Rapid COVID-19 testing is negative in office today.   Confirmatory PCR swab deferred based on clinical picture and vaccination status. Rapid strep testing is positive. Prescription written for Omnicef, side effects discussed. Increase fluids and rest. Symptomatic relief discussed including NSAIDs and/or Tylenol prn pain/fever. Schedule virtual f/u with PCP in 7-10 days if symptoms persist. ED sooner if symptoms worsen or change. ED immediately with high or refractory fever, progressive SOB, dyspnea, CP, calf pain/swelling, shaking chills, vomiting, abdominal pain, lethargy, flank pain, or decreased urinary output. Pt verbalizes understanding and is in agreement with plan of care. All questions answered. Gay Santos PA-C    **This report was transcribed using voice recognition software. Every effort was made to ensure accuracy; however, inadvertent computerized transcription errors may be present.

## 2021-11-16 NOTE — LETTER
[facility] [place]  [address] [place] [address]  Phone: [phone]  Fax: [phone]    Miguel Aldrich. Isa Orozco        November 16, 2021     Patient: Bijal Jacobs   YOB: 1975   Date of Visit: 11/16/2021       To Whom It May Concern: It is my medical opinion that Bijal Jacobs should remain out of work until 11/18/21. Please excuse him from missing work from 11/15-11/17. If you have any questions or concerns, please don't hesitate to call. Sincerely,        Miguel Aldrich.  MAXIMO Santos

## 2021-11-22 ENCOUNTER — VIRTUAL VISIT (OUTPATIENT)
Dept: FAMILY MEDICINE CLINIC | Age: 46
End: 2021-11-22
Payer: COMMERCIAL

## 2021-11-22 DIAGNOSIS — L40.9 PSORIASIS: ICD-10-CM

## 2021-11-22 DIAGNOSIS — I25.2 HISTORY OF MI (MYOCARDIAL INFARCTION): ICD-10-CM

## 2021-11-22 DIAGNOSIS — L30.9 ECZEMA, UNSPECIFIED TYPE: Primary | ICD-10-CM

## 2021-11-22 DIAGNOSIS — I10 ESSENTIAL HYPERTENSION: ICD-10-CM

## 2021-11-22 PROCEDURE — G8427 DOCREV CUR MEDS BY ELIG CLIN: HCPCS | Performed by: FAMILY MEDICINE

## 2021-11-22 PROCEDURE — 99213 OFFICE O/P EST LOW 20 MIN: CPT | Performed by: FAMILY MEDICINE

## 2021-11-22 RX ORDER — HYDROCHLOROTHIAZIDE 25 MG/1
25 TABLET ORAL 2 TIMES DAILY
Qty: 180 TABLET | Refills: 1 | Status: SHIPPED
Start: 2021-11-22 | End: 2022-04-04

## 2021-11-22 RX ORDER — LISINOPRIL 20 MG/1
20 TABLET ORAL 2 TIMES DAILY
Qty: 180 TABLET | Refills: 1 | Status: SHIPPED
Start: 2021-11-22 | End: 2022-04-04

## 2021-11-22 RX ORDER — ATORVASTATIN CALCIUM 10 MG/1
10 TABLET, FILM COATED ORAL DAILY
Qty: 90 TABLET | Refills: 1 | Status: SHIPPED
Start: 2021-11-22 | End: 2022-10-12 | Stop reason: SDUPTHER

## 2021-11-22 ASSESSMENT — ENCOUNTER SYMPTOMS
WHEEZING: 0
SORE THROAT: 0
NAUSEA: 0
CHEST TIGHTNESS: 0
VOMITING: 0
TROUBLE SWALLOWING: 0
CONSTIPATION: 0
DIARRHEA: 0
COUGH: 0
BACK PAIN: 0
SHORTNESS OF BREATH: 0

## 2021-11-22 NOTE — PROGRESS NOTES
2021    TELEHEALTH EVALUATION -- Audio/Visual (During YJMMT-68 public health emergency)    HPI:    Ruthie Bull (:  1975) has requested an audio/video evaluation for the following concern(s):    HTN: Doing well. Wants to split his Lisinopril and HCTZ instead of the combo. States the two medications help him remember. He occasionally will check his BP at a Pharmacy but not checking at home. No CP/SOB/Headaches. Eczema/Psoriasis: He is using topicals currently but wants to try [place] [facility]. Has a relative on it and her eczema control is doing much better than his. Hx of MI: On ASA 81mg and Lipitor. Doing well. Needs refill on  Lipitor. Review of Systems   Constitutional: Negative for chills and fever. HENT: Negative for sore throat and trouble swallowing. Respiratory: Negative for cough, chest tightness, shortness of breath and wheezing. Cardiovascular: Negative for chest pain, palpitations and leg swelling. Gastrointestinal: Negative for constipation, diarrhea, nausea and vomiting. Musculoskeletal: Negative for back pain. Neurological: Negative for dizziness, light-headedness and headaches. Prior to Visit Medications    Medication Sig Taking?  Authorizing Provider   Apremilast 10 & 20 & 30 MG TBPK Take as directed in pack with goal dose of 30mg twice daily Yes Alma Rosa Lee MD   atorvastatin (LIPITOR) 10 MG tablet Take 1 tablet by mouth daily Yes Alma Rosa Lee MD   lisinopril (PRINIVIL;ZESTRIL) 20 MG tablet Take 1 tablet by mouth 2 times daily Yes Alma Rosa Lee MD   hydroCHLOROthiazide (HYDRODIURIL) 25 MG tablet Take 1 tablet by mouth 2 times daily Yes Alma Rosa Lee MD   buprenorphine-naloxone (SUBOXONE) 8-2 MG FILM SL film dissolve 2 FILMS under the tongue once daily Yes Historical Provider, MD   aspirin 81 MG EC tablet Take 81 mg by mouth daily Yes Historical Provider, MD   clobetasol propionate 0.05 % LOTN lotion Apply topically to affected area 2 times daily Yes Ruslan Belle House, MD   clobetasol (OLUX) 0.05 % foam Apply topically to scalp 2 times daily. Yes Louie Carr MD   Elastic Bandages & Supports (NEOPRENE KNEE BRACE) MISC Apply to right knee daily Yes Louie Carr MD       Social History     Tobacco Use    Smoking status: Former Smoker     Packs/day: 2.00     Years: 7.00     Pack years: 14.00     Types: Cigarettes     Quit date:      Years since quitting: 10.8    Smokeless tobacco: Never Used   Vaping Use    Vaping Use: Never used   Substance Use Topics    Alcohol use: No     Comment: Quit since     Drug use: Not Currently          PHYSICAL EXAMINATION:  [ INSTRUCTIONS:  \"[x]\" Indicates a positive item  \"[]\" Indicates a negative item  -- DELETE ALL ITEMS NOT EXAMINED]  Vital Signs: (As obtained by patient/caregiver or practitioner observation)    Blood pressure-  Heart rate-    Respiratory rate-    Temperature-  Pulse oximetry-     Constitutional: [x] Appears well-developed and well-nourished [x] No apparent distress      [] Abnormal-   Mental status  [x] Alert and awake  [x] Oriented to person/place/time [x]Able to follow commands      Eyes:  EOM    [x]  Normal  [] Abnormal-  Sclera  [x]  Normal  [] Abnormal -         Discharge [x]  None visible  [] Abnormal -    HENT:   [x] Normocephalic, atraumatic.   [] Abnormal   [x] Mouth/Throat: Mucous membranes are moist.     External Ears [x] Normal  [] Abnormal-     Neck: [x] No visualized mass     Pulmonary/Chest: [x] Respiratory effort normal.  [x] No visualized signs of difficulty breathing or respiratory distress        [] Abnormal-      Musculoskeletal:   [x] Normal gait with no signs of ataxia         [x] Normal range of motion of neck        [] Abnormal-       Neurological:        [x] No Facial Asymmetry (Cranial nerve 7 motor function) (limited exam to video visit)          [x] No gaze palsy        [] Abnormal-         Skin:        [x] No significant exanthematous lesions or discoloration noted on facial skin [] Abnormal-            Psychiatric:       [x] Normal Affect [x] No Hallucinations        [] Abnormal-     Other pertinent observable physical exam findings-     ASSESSMENT/PLAN:    1. Eczema, unspecified type  Will send Rx for Otezla per pt request  May not be covered and may need Derm referral if he would like to pursue biologics  - Apremilast 10 & 20 & 30 MG TBPK; Take as directed in pack with goal dose of 30mg twice daily  Dispense: 1 each; Refill: 0    2. Psoriasis  As above  Has been on MTX as well as Enbrel in the past  Requesting Otezla  Sent starter pack  - Apremilast 10 & 20 & 30 MG TBPK; Take as directed in pack with goal dose of 30mg twice daily  Dispense: 1 each; Refill: 0    3. History of MI (myocardial infarction)  Doing well  No chest pain  Lipids at goal  Refill Lipitor  - atorvastatin (LIPITOR) 10 MG tablet; Take 1 tablet by mouth daily  Dispense: 90 tablet; Refill: 1    4. Essential hypertension  BP overall stable although not checking frequently  No symptoms  Wants Prinzide split to two medications  - lisinopril (PRINIVIL;ZESTRIL) 20 MG tablet; Take 1 tablet by mouth 2 times daily  Dispense: 180 tablet; Refill: 1  - hydroCHLOROthiazide (HYDRODIURIL) 25 MG tablet; Take 1 tablet by mouth 2 times daily  Dispense: 180 tablet; Refill: 1      Return if symptoms worsen or fail to improve, for Follow up in office for BP - MarchEugenio Watson, was evaluated through a synchronous (real-time) audio-video encounter. The patient (or guardian if applicable) is aware that this is a billable service. Verbal consent to proceed has been obtained within the past 12 months. The visit was conducted pursuant to the emergency declaration under the [facility], [address] [place] [address] authority and the [employer] General Act. Patient identification was verified, and a caregiver was present when appropriate.  The patient was located in a state where the provider was credentialed to provide care. Total time spent on this encounter: Not billed by time    --Rodrick Kohler MD on 11/22/2021 at 1:13 PM    An electronic signature was used to authenticate this note.

## 2021-11-22 NOTE — PROGRESS NOTES
TeleMedicine Patient Consent    This visit was performed as a virtual video visit using a synchronous, two-way, audio-video telehealth technology platform. Patient identification was verified at the start of the visit, including the patient's telephone number and physical location. I discussed with the patient the nature of our telehealth visits, that:     1. Due to the nature of an audio- video modality, the only components of a physical exam that could be done are the elements supported by direct observation. 2. The provider will evaluate the patient and recommend diagnostics and treatments based on their assessment. 3. If it was felt that the patient should be evaluated in clinic or an emergency room setting, then they would be directed there. 4. Our sessions are not being recorded and that personal health information is protected. 5. Our team would provide follow up care in person if/when the patient needs it. Patient does agree to proceed with telemedicine consultation. Patient location: home address in [facility]    Physician location: regular office location Other people involved in call: Preceptor  This visit was completed virtually using Doxy. me    This visit was performed during the 6463 public health crisis and COVID-19 pandemic.   *Add 95 modifier to all Video Visits*

## 2021-11-22 NOTE — PROGRESS NOTES
Doni [facility] Video Visit Precepting Note    Subjective: This Telehealth visit was performed as two-way, audio-video technology platform. Verbal consent was taken from patient as noted in medical assistant's/nurse's note. HTN  Not checking regularly  On prinzide- wants it to be split  Hx of MI- on Lipitor  Psoriasis- on topical regimen  Wants to try Otelza      Objective:  As noted in resident's note. Assessment/Plan:  HTN: as resident's note. Monitor BP  CAD: continue Lipitor  Psoriais- will try to prescribe [facility]     Attending Physician Statement  I have reviewed the chart, including any radiology or labs. I have discussed the case, including pertinent history with the resident. I agree with the assessment, plan and orders as documented by the resident. Please refer to the resident note for additional information.     Electronically signed by Yelena Avina MD on 11/22/21 at 1:10 PM EST

## 2021-11-24 ENCOUNTER — TELEPHONE (OUTPATIENT)
Dept: FAMILY MEDICINE CLINIC | Age: 46
End: 2021-11-24

## 2021-11-24 NOTE — TELEPHONE ENCOUNTER
Patient was seen on 11/22 by Dr. Hernando Martell and had a prescription sent to [facility] on [address] for Barnag. Patient states that the pharmacy needs prior authorization for this medication.

## 2021-11-29 DIAGNOSIS — L30.9 ECZEMA, UNSPECIFIED TYPE: ICD-10-CM

## 2021-11-29 DIAGNOSIS — L40.9 PSORIASIS: Primary | ICD-10-CM

## 2021-11-29 NOTE — PROGRESS NOTES
Pt requested Leslie Rodriguez at last office visit. It was denied by insurance so he is requesting referral to dermatology for assistance with getting that or another biologic prescribed.     Electronically signed by Karen Powell MD on 11/29/2021 at 12:53 PM

## 2021-12-08 ENCOUNTER — TELEPHONE (OUTPATIENT)
Dept: FAMILY MEDICINE CLINIC | Age: 46
End: 2021-12-08

## 2021-12-08 NOTE — TELEPHONE ENCOUNTER
----- Message from Niall Bal sent at 12/8/2021  1:53 PM EST -----  Subject: Referral Request    QUESTIONS   Reason for referral request? [address]   Has the physician seen you for this condition before? No   Preferred Specialist (if applicable)? Do you already have an appointment scheduled? No  Additional Information for Provider? PT NEEDS A DERMOTOLOGIST REFERRAL   CALL OR TEXT   ---------------------------------------------------------------------------  --------------  CALL BACK INFO  What is the best way for the office to contact you? OK to leave message on   voicemail  Preferred Call Back Phone Number?  [phone]

## 2021-12-09 NOTE — TELEPHONE ENCOUNTER
Derm referral was sent on Nov 29th and patient was informed.  Dr. Jennifer Brewer was the referral.    Electronically signed by Malgorzata Bacon MD on 12/9/2021 at 7:03 AM

## 2022-01-04 DIAGNOSIS — L30.9 ECZEMA, UNSPECIFIED TYPE: ICD-10-CM

## 2022-01-05 RX ORDER — CLOBETASOL PROPIONATE 0.5 MG/ML
LOTION TOPICAL
Qty: 118 ML | Refills: 3 | Status: SHIPPED
Start: 2022-01-05 | End: 2022-01-25 | Stop reason: SDUPTHER

## 2022-01-20 ENCOUNTER — OFFICE VISIT (OUTPATIENT)
Dept: PRIMARY CARE CLINIC | Age: 47
End: 2022-01-20
Payer: COMMERCIAL

## 2022-01-20 VITALS
HEIGHT: 69 IN | BODY MASS INDEX: 35.55 KG/M2 | DIASTOLIC BLOOD PRESSURE: 87 MMHG | TEMPERATURE: 97.3 F | SYSTOLIC BLOOD PRESSURE: 127 MMHG | WEIGHT: 240 LBS | OXYGEN SATURATION: 93 % | RESPIRATION RATE: 20 BRPM | HEART RATE: 108 BPM

## 2022-01-20 DIAGNOSIS — U07.1 COVID-19: Primary | ICD-10-CM

## 2022-01-20 DIAGNOSIS — Z01.89 PATIENT REQUEST FOR DIAGNOSTIC TESTING: ICD-10-CM

## 2022-01-20 DIAGNOSIS — R11.0 NAUSEA: ICD-10-CM

## 2022-01-20 DIAGNOSIS — R05.9 COUGH: ICD-10-CM

## 2022-01-20 LAB — S PYO AG THROAT QL: NORMAL

## 2022-01-20 PROCEDURE — G8427 DOCREV CUR MEDS BY ELIG CLIN: HCPCS | Performed by: NURSE PRACTITIONER

## 2022-01-20 PROCEDURE — G8482 FLU IMMUNIZE ORDER/ADMIN: HCPCS | Performed by: NURSE PRACTITIONER

## 2022-01-20 PROCEDURE — 99213 OFFICE O/P EST LOW 20 MIN: CPT | Performed by: NURSE PRACTITIONER

## 2022-01-20 PROCEDURE — 1036F TOBACCO NON-USER: CPT | Performed by: NURSE PRACTITIONER

## 2022-01-20 PROCEDURE — G8417 CALC BMI ABV UP PARAM F/U: HCPCS | Performed by: NURSE PRACTITIONER

## 2022-01-20 PROCEDURE — 87880 STREP A ASSAY W/OPTIC: CPT | Performed by: NURSE PRACTITIONER

## 2022-01-20 RX ORDER — DOXYCYCLINE HYCLATE 100 MG/1
100 CAPSULE ORAL 2 TIMES DAILY
Qty: 20 CAPSULE | Refills: 0 | Status: SHIPPED | OUTPATIENT
Start: 2022-01-20 | End: 2022-01-30

## 2022-01-20 RX ORDER — ONDANSETRON 4 MG/1
4 TABLET, ORALLY DISINTEGRATING ORAL 3 TIMES DAILY PRN
Qty: 21 TABLET | Refills: 0 | Status: SHIPPED
Start: 2022-01-20 | End: 2022-07-11 | Stop reason: ALTCHOICE

## 2022-01-20 RX ORDER — BENZONATATE 100 MG/1
100 CAPSULE ORAL 3 TIMES DAILY PRN
Qty: 30 CAPSULE | Refills: 0 | Status: SHIPPED | OUTPATIENT
Start: 2022-01-20 | End: 2022-01-27

## 2022-01-20 RX ORDER — PREDNISONE 20 MG/1
20 TABLET ORAL 2 TIMES DAILY
Qty: 10 TABLET | Refills: 0 | Status: SHIPPED | OUTPATIENT
Start: 2022-01-20 | End: 2022-01-25

## 2022-01-20 NOTE — PATIENT INSTRUCTIONS
[address] [facility] [place]  [address] [place] [id number]  Dept: [phone]  Dept Fax: [phone] CARIDAD CNP      1/20/2022     Patient: Linnette Puckett   YOB: 1975       To Whom It May Concern: It is my medical opinion that Linnette Puckett should remain out of work while acutely ill and positive COVID-19 test results. Return to work with no retesting should be followed as outlined by CDC/ODH:   a. No fever without the use of fever reducers for 24 hours  b. Improvement in symptoms     If tests positive for COVID-19, needs minimum of 5 days strict quarantine from onset of symptoms followed by 5 days of strict masking,  improvement of symptoms and 24 hours fever free without fever reducing medications. If symptoms have not improved then quarantine needs to be extended until symptoms improve. If you have any questions or concerns, please don't hesitate to call. Sincerely,        CARIDAD Black CNP         Patient Education        Learning How To Care for Someone Who Has COVID-19  Things to know  Most people who get COVID-19 will recover with time and home care. Here are some things to know if you're caring for someone who's sick. · Treat the symptoms. Common symptoms include a fever, coughing, and feeling short of breath. Urge the person to get extra rest and drink plenty of fluids to replace fluids lost from fever. To reduce a fever, offer acetaminophen (Tylenol) or ibuprofen (Advil, Motrin). It may also help with muscle aches. Read and follow all instructions on the label. · Watch for signs that the illness is getting worse. The person may need medical care if they're getting sicker (for example, if it's hard to breathe). But call the doctor's office before you go. They can tell you what to do.   Call 911  or emergency services if the person has any of these symptoms:  ? Severe trouble breathing or shortness of breath. ? Constant pain or pressure in their chest.  ? Confusion, or trouble thinking clearly. ? Pale, gray, or blue-colored skin or lips. Some people are more likely to get very sick and need medical care. Call the doctor as soon as symptoms start if the person you're caring for is over 72, smokes, or has a serious health problem, like asthma, heart disease, diabetes, or an immune system problem. · Protect yourself and others. The virus spreads easily from person to person, so take extra care to avoid catching or spreading the infection. ? Keep the sick person away from others as much as you can. § Have the person stay in one room. If you can, give them their own bathroom to use. § Have only one person take care of them. Keep other peopleand petsout of the sickroom. § Have the person wear a mask around other people. This includes when anyone is in the room with them or if they leave their room (for example, to go to the bathroom). § Don't share personal items. These include dishes, cups, towels, and bedding. ? Wash your hands often and well. Use soap and water, and scrub for at least 20 seconds. This is especially important after you've been around the sick person or touched things they've touched. If soap and water aren't handy, use an alcohol-based hand . ? Wear a mask when caring for someone who is sick. And wear a mask when you're around other people after you've cared for someone who's sick. ? Avoid touching your mouth, nose, and eyes. ? Take care with the person's laundry. It's okay to wash the sick person's laundry with yours. If you have them, wear disposable gloves when handling their dirty laundry, and wash your hands well after you touch it. Wash items in the warmest water allowed for the fabric type, and dry them completely. ? Clean high-touch items every day and anytime the sick person touches them.  These include doorknobs, light switches, toilets, counters, and remote controls. Use a household disinfectant or a homemade bleach solution. (Follow the directions on the label.) If the sick person has their own room, have them disinfect it every day. ? Avoid having visitors. If you have to have visitors, everyone needs to wear a mask and stay at least 6 feet (2 meters) away from you. And keep the visit as short as possible. To help protect family and friends, stay in touch with them only by phone or computer. ? If you haven't had COVID-19 in the past 3 months or aren't fully vaccinated, you may need to quarantine. Where can you learn more? Go to [email or web address] and sign in to your [employer] account. Enter V472 in the Meograph box to learn more about \"Learning How To Care for Someone Who Has COVID-19. \"     If you do not have an account, please click on the \"Sign Up Now\" link. Current as of: March 26, 2021               Content Version: 13.1  © 8808-0404 [employer], Incorporated. Care instructions adapted under license by [facility] ([facility]). If you have questions about a medical condition or this instruction, always ask your healthcare professional. [place] [address] any warranty or liability for your use of this information.   Use over the counter medications as needed to treat your symptoms    Recommended vitamins:  Vitamin c - 500 mg 2 x day  Vitamin D3 - 1000 iu daily   Zinc - 50 mg daily    New CDC guidelines are 5 days of strict quarantine from onset of symptoms followed by 5 days of strict mask wearing while out    Follow with your PCP for complications of covid lasting greater than 10 days

## 2022-01-20 NOTE — PROGRESS NOTES
Chief Complaint   Congestion, Cough (coughing up brown phlegm), Otalgia (right ear), Fatigue, and Nausea & Vomiting (loss of appetite)      History of Present Illness   Source of history provided by:  patient. Vanessa Montgomery is a 51 y.o. old male who has a past medical history of:   Past Medical History:   Diagnosis Date    Chronic back pain 8/2/2011    Eczema     Essential hypertension     Presents to the flu clinic with complaints of Fever, Generalized Body Aches, Headache, Pharyngitis, dry Cough, Nausea and Fatigue x 4 days. States symptoms have worsened  since onset. Has been taking cepacol without symptomatic relief. Denies any Fever or Shortness of breath. Denies any hx of no history of pneumonia or bronchitis. ROS   Pertinent positives and negatives are stated within HPI, all other systems reviewed and are negative. Surgical History:  has a past surgical history that includes Ankle surgery (2009). Social History:  reports that he quit smoking about 11 years ago. His smoking use included cigarettes. He has a 14.00 pack-year smoking history. He has never used smokeless tobacco. He reports previous drug use. He reports that he does not drink alcohol. Family History: family history includes COPD in his father; Cancer in his paternal grandmother; Diabetes in his brother, father, and paternal grandmother; Heart Disease in his father; High Blood Pressure in his father and mother; Prostate Cancer in his paternal grandfather. Allergies: Morphine and Penicillins    Physical Exam      VS:  /87   Pulse 108   Temp 97.3 °F (36.3 °C)   Resp 20   Ht 5' 9\" (1.753 m)   Wt 240 lb (108.9 kg)   SpO2 93%   BMI 35.44 kg/m²    Oxygen Saturation Interpretation: Normal.    Constitutional:  Alert, development consistent with age. NAD. Head:  NC/NT. Airway patent. Ears: TMs wnl bilaterally. Canals without exudate or swelling bilaterally.   Mouth: Posterior pharynx with mild erythema and clear postnasal drip. no tonsillar hypertrophy or exudate. Neck:  Normal ROM. Supple. no anterior cervical adenopathy noted. Lungs: CTAB without wheezes, rales, or rhonchi. CV:  Regular rate and rhythm, normal heart sounds, without pathological murmurs, ectopy, gallops, or rubs. Skin:  Normal turgor. Warm, dry, without visible rash. Lymphatic: No lymphangitis or adenopathy noted. Neurological:  Oriented. Motor functions intact. Lab / Imaging Results   (All laboratory and radiology results have been personally reviewed by myself)  Labs:  No results found for this visit on 01/20/22. Imaging: All Radiology results interpreted by Radiologist unless otherwise noted. No results found. Medical Decision Making   Pt non-toxic, in no apparent distress and stable at time of discharge. Assessment/Plan   Frances Banks was seen today for congestion, cough, otalgia, fatigue and nausea & vomiting. Diagnoses and all orders for this visit:    COVID-19  -     doxycycline hyclate (VIBRAMYCIN) 100 MG capsule; Take 1 capsule by mouth 2 times daily for 10 days  -     predniSONE (DELTASONE) 20 MG tablet; Take 1 tablet by mouth 2 times daily for 5 days  -     benzonatate (TESSALON PERLES) 100 MG capsule; Take 1 capsule by mouth 3 times daily as needed for Cough    Cough  -     POCT rapid strep A    Patient request for diagnostic testing  -     POCT rapid strep A    Nausea  -     ondansetron (ZOFRAN-ODT) 4 MG disintegrating tablet; Take 1 tablet by mouth 3 times daily as needed for Nausea or Vomiting         Increase fluids and rest. Symptomatic relief discussed including Tylenol prn pain/fever. Schedule f/u with PCP in 7-10 days if symptoms persist. ED sooner if symptoms worsen or change. ED immediately with high or refractory fever, progressive SOB, dyspnea, CP, calf pain/swelling, shaking chills, vomiting, abdominal pain, lethargy, flank pain, or decreased urinary output.  Pt verbalizes understanding and is in agreement with plan of care. All questions answered. Porfirio Mejía, APRN - CNP    This visit was provided as a focused evaluation during the COVID -19 pandemic/national emergency. A comprehensive review of all previous patient history and testing was not conducted. Pertinent findings were elicited during the visit.

## 2022-01-25 DIAGNOSIS — L30.9 ECZEMA, UNSPECIFIED TYPE: ICD-10-CM

## 2022-01-25 RX ORDER — CLOBETASOL PROPIONATE 0.5 MG/ML
LOTION TOPICAL
Qty: 118 ML | Refills: 3 | Status: SHIPPED
Start: 2022-01-25 | End: 2022-03-25 | Stop reason: SDUPTHER

## 2022-03-09 ENCOUNTER — TELEPHONE (OUTPATIENT)
Dept: FAMILY MEDICINE CLINIC | Age: 47
End: 2022-03-09

## 2022-03-09 NOTE — TELEPHONE ENCOUNTER
Patient would like an order for his type and screen, he is trying to see if he can be a donor for bone marrow. Please advise.

## 2022-03-10 NOTE — TELEPHONE ENCOUNTER
If he is looking to donate bone marrow he needs to contact the agency that he is going through. They would order that and likely would not even accept my order. If they need a physician order have them send paperwork.     Electronically signed by Daron Mendez MD on 3/10/2022 at 11:14 AM

## 2022-03-24 DIAGNOSIS — L30.9 ECZEMA, UNSPECIFIED TYPE: ICD-10-CM

## 2022-03-24 RX ORDER — CLOBETASOL PROPIONATE 0.5 MG/G
AEROSOL, FOAM TOPICAL
Qty: 100 G | Refills: 3 | Status: SHIPPED
Start: 2022-03-24 | End: 2022-07-11 | Stop reason: SDUPTHER

## 2022-03-25 ENCOUNTER — OFFICE VISIT (OUTPATIENT)
Dept: FAMILY MEDICINE CLINIC | Age: 47
End: 2022-03-25
Payer: COMMERCIAL

## 2022-03-25 VITALS
DIASTOLIC BLOOD PRESSURE: 89 MMHG | TEMPERATURE: 97.8 F | HEIGHT: 68 IN | HEART RATE: 88 BPM | BODY MASS INDEX: 43.59 KG/M2 | OXYGEN SATURATION: 97 % | SYSTOLIC BLOOD PRESSURE: 145 MMHG | WEIGHT: 287.6 LBS

## 2022-03-25 DIAGNOSIS — S22.069D CLOSED FRACTURE OF EIGHTH THORACIC VERTEBRA WITH ROUTINE HEALING, UNSPECIFIED FRACTURE MORPHOLOGY, SUBSEQUENT ENCOUNTER: ICD-10-CM

## 2022-03-25 DIAGNOSIS — L30.9 ECZEMA, UNSPECIFIED TYPE: ICD-10-CM

## 2022-03-25 DIAGNOSIS — I10 ESSENTIAL HYPERTENSION: Primary | Chronic | ICD-10-CM

## 2022-03-25 DIAGNOSIS — M54.16 LUMBAR BACK PAIN WITH RADICULOPATHY AFFECTING RIGHT LOWER EXTREMITY: ICD-10-CM

## 2022-03-25 PROCEDURE — G8417 CALC BMI ABV UP PARAM F/U: HCPCS | Performed by: FAMILY MEDICINE

## 2022-03-25 PROCEDURE — 1036F TOBACCO NON-USER: CPT | Performed by: FAMILY MEDICINE

## 2022-03-25 PROCEDURE — 99214 OFFICE O/P EST MOD 30 MIN: CPT | Performed by: FAMILY MEDICINE

## 2022-03-25 PROCEDURE — G8427 DOCREV CUR MEDS BY ELIG CLIN: HCPCS | Performed by: FAMILY MEDICINE

## 2022-03-25 PROCEDURE — G8482 FLU IMMUNIZE ORDER/ADMIN: HCPCS | Performed by: FAMILY MEDICINE

## 2022-03-25 RX ORDER — GABAPENTIN 400 MG/1
400 CAPSULE ORAL 2 TIMES DAILY
Qty: 180 CAPSULE | Refills: 0 | Status: SHIPPED
Start: 2022-03-25 | End: 2022-04-04

## 2022-03-25 RX ORDER — CLOBETASOL PROPIONATE 0.5 MG/ML
LOTION TOPICAL
Qty: 118 ML | Refills: 3 | Status: SHIPPED
Start: 2022-03-25 | End: 2022-06-27

## 2022-03-25 ASSESSMENT — ENCOUNTER SYMPTOMS
WHEEZING: 0
VOMITING: 0
SHORTNESS OF BREATH: 0
CONSTIPATION: 0
DIARRHEA: 0
NAUSEA: 0
COUGH: 0
SORE THROAT: 0
BACK PAIN: 0
TROUBLE SWALLOWING: 0
CHEST TIGHTNESS: 0

## 2022-03-25 NOTE — PROGRESS NOTES
3/28/2022    Alex Naidu is a 51 y.o. malehere for:    HPI:    HTN: Has been doing well. Checking blood pressure infrequently. He was in a rush today so he did not take his blood pressure medicine. Psoriasis/Eczema: He has a follow-up appointment with dermatology. Has been using the topical clobetasol with mild relief. Back is the worst area and he has extensive lesions across his back. T8 fracture: He has a history of a compression fracture of T8 back in 2010. 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 He continues to have back pain as well as radiculopathy of the right leg. Normally the back pain is around the T8 area however he does also have some lumbar back pain and occasional neck pain. He has not done any physical therapy or injections. He was offered some surgical interventions at the time. He is interesting in seeing the neurosurgeon that he saw at that time again. Also would be interested in other treatment modalities. He was on 800 3 times a day of gabapentin while he was in prison. He was also previously on Lyrica and did not like the effects of this. He is not a candidate for narcotics given he is on Suboxone and has no interest in any narcotics at any time his future. COVID Vaccine Status: Moderna  BP Readings from Last 3 Encounters:   03/25/22 (!) 145/89   01/20/22 127/87   11/16/21 (!) 147/86     Current Outpatient Medications   Medication Sig Dispense Refill    clobetasol propionate 0.05 % LOTN lotion Apply to affected area 2 times daily 118 mL 3    gabapentin (NEURONTIN) 400 MG capsule Take 1 capsule by mouth in the morning and at bedtime for 90 days.  180 capsule 0    clobetasol (OLUX) 0.05 % foam APPLY TOPICALLY TO SCALP 2 TIMES DAILY 100 g 3    ondansetron (ZOFRAN-ODT) 4 MG disintegrating tablet Take 1 tablet by mouth 3 times daily as needed for Nausea or Vomiting 21 tablet 0    atorvastatin (LIPITOR) 10 MG tablet Take 1 tablet by mouth daily 90 tablet 1    lisinopril (PRINIVIL;ZESTRIL) 20 MG tablet Take 1 tablet by mouth 2 times daily 180 tablet 1    hydroCHLOROthiazide (HYDRODIURIL) 25 MG tablet Take 1 tablet by mouth 2 times daily 180 tablet 1    buprenorphine-naloxone (SUBOXONE) 8-2 MG FILM SL film dissolve 2 FILMS under the tongue once daily      aspirin 81 MG EC tablet Take 81 mg by mouth daily      Elastic Bandages & Supports (NEOPRENE KNEE BRACE) MISC Apply to right knee daily 1 each 0     No current facility-administered medications for this visit. Allergies   Allergen Reactions    Morphine Nausea And Vomiting    Penicillins Rash       Past Medical & Surgical History:      Diagnosis Date    Chronic back pain 2011    Eczema     Essential hypertension      Past Surgical History:   Procedure Laterality Date    ANKLE SURGERY      ORIF on Rt ankle       Family History:      Problem Relation Age of Onset    High Blood Pressure Mother     Diabetes Father     Heart Disease Father     High Blood Pressure Father     COPD Father     Diabetes Brother     Diabetes Paternal Grandmother     Cancer Paternal Grandmother         Unknown    Prostate Cancer Paternal Grandfather        Social History:  Social History     Tobacco Use    Smoking status: Former Smoker     Packs/day: 2.00     Years: 7.00     Pack years: 14.00     Types: Cigarettes     Quit date:      Years since quittin.2    Smokeless tobacco: Never Used   Substance Use Topics    Alcohol use: No     Comment: Quit since        Immunization History   Administered Date(s) Administered    COVID-19, Graciella Anibal, Primary or Immunocompromised, PF, 100mcg/0.5mL 2021, 2021, 09/15/2021    Influenza, MDCK Quadv, IM, PF (Flucelvax 2 yrs and older) 10/18/2021       Review of Systems   Constitutional: Negative for chills and fever. HENT: Negative for sore throat and trouble swallowing.     Respiratory: Negative for cough, chest tightness, shortness of breath and wheezing. Cardiovascular: Negative for chest pain, palpitations and leg swelling. Gastrointestinal: Negative for constipation, diarrhea, nausea and vomiting. Musculoskeletal: Negative for back pain. Neurological: Negative for dizziness, light-headedness and headaches. VS:  BP (!) 145/89   Pulse 88   Temp 97.8 °F (36.6 °C) (Temporal)   Ht 5' 8\" (1.727 m)   Wt 287 lb 9.6 oz (130.5 kg)   SpO2 97%   BMI 43.73 kg/m²     Physical Exam  Vitals reviewed. Constitutional:       General: He is not in acute distress. Appearance: Normal appearance. He is not ill-appearing. HENT:      Head: Normocephalic and atraumatic. Cardiovascular:      Rate and Rhythm: Normal rate and regular rhythm. Pulses: Normal pulses. Heart sounds: Normal heart sounds. No murmur heard. Pulmonary:      Effort: Pulmonary effort is normal. No respiratory distress. Breath sounds: No wheezing, rhonchi or rales. Musculoskeletal:         General: No swelling. Normal range of motion. Cervical back: Normal range of motion. No rigidity. Thoracic back: Tenderness (Minimal paraspinal tenderness around T8-T10) present. No swelling, spasms or bony tenderness. Normal range of motion. Lumbar back: No spasms, tenderness or bony tenderness. Normal range of motion. Negative right straight leg raise test and negative left straight leg raise test.      Right lower leg: No edema. Left lower leg: No edema. Comments: No SI joint tenderness   Skin:     General: Skin is warm and dry. Comments: Diffuse erythematous scaly lesions across the back   Neurological:      General: No focal deficit present. Mental Status: He is alert. Motor: No weakness. Assessment/Plan:    1.  Eczema, unspecified type  We will refill his topical lotion  Has follow-up with dermatology scheduled in the beginning of May  Likely would benefit from a biologic given his diffuse disease  - clobetasol propionate 0.05 % LOTN lotion; Apply to affected area 2 times daily  Dispense: 118 mL; Refill: 3    2. Essential hypertension  Continue meds  Blood pressure slightly above goal today in office due to not taking medication as    3. Closed fracture of eighth thoracic vertebra with routine healing, unspecified fracture morphology, subsequent encounter  We will get updated plain films of the spine  Patient would like to see the neurosurgeon that he saw in the past which is Dr. Yadira Chan, sounds like he was offered kyphoplasty in the past  We will also start the process with pain management as he may likely benefit from more directed intervention  Patient is on Suboxone and is not interested nor would he be a candidate for any narcotic pain management. His back pain is one of the reasons he ended up on Suboxone  - XR THORACIC SPINE (3 VIEWS); Future  - XR LUMBAR SPINE (2-3 VIEWS); Future  - Bello Henry MD, Pain Medicine, [address] [place] [address] (Fay Coker) - Dudley Kaiser MD, Neurosurgery, [place] [facility])  - gabapentin (NEURONTIN) 400 MG capsule; Take 1 capsule by mouth in the morning and at bedtime for 90 days. Dispense: 180 capsule; Refill: 0    4. Lumbar back pain with radiculopathy affecting right lower extremity  We will start gabapentin at half the dose he was on in jail and twice daily as opposed to 3 times daily  - gabapentin (NEURONTIN) 400 MG capsule; Take 1 capsule by mouth in the morning and at bedtime for 90 days. Dispense: 180 capsule; Refill: 0      Follow up: 3 months after seeing specialists    Patient agrees with the above stated plan.       Regan Lee MD  PGY-3 Family Medicine

## 2022-03-25 NOTE — PROGRESS NOTES
[address]  Precepting Note    Subjective:  Psoriasis on topical steroids, following with dermatology  HTN: elevated BP, usually controlled  Hx of traumatic T8 fracture-  Chronic pain  Lumbar pain as well  Used to follow up with neurosurgery    ROS otherwise negative     Past medical, surgical, family and social history were reviewed, non-contributory, and unchanged unless otherwise stated. Objective:    BP (!) 145/89   Pulse 88   Temp 97.8 °F (36.6 °C) (Temporal)   Ht 5' 8\" (1.727 m)   Wt 287 lb 9.6 oz (130.5 kg)   SpO2 97%   BMI 43.73 kg/m²     Exam is as noted by resident     Assessment/Plan:  Psoriasis  HTN  Back pain with radiation    Imaging, restart gabapentin  Refer to pain management  Continue current treatment for HTN  Monitor BP  F/u as instructed     Attending Physician Statement  I have reviewed the chart, including any radiology or labs. I have discussed the case, including pertinent history and exam findings with the resident. I agree with the assessment, plan and orders as documented by the resident. Please refer to the resident note for additional information.       Electronically signed by Mary Day MD on 3/25/2022 at 2:42 PM

## 2022-03-28 ENCOUNTER — TELEPHONE (OUTPATIENT)
Dept: FAMILY MEDICINE CLINIC | Age: 47
End: 2022-03-28

## 2022-03-28 DIAGNOSIS — S22.060S COMPRESSION FRACTURE OF T8 VERTEBRA, SEQUELA: Primary | ICD-10-CM

## 2022-03-28 NOTE — TELEPHONE ENCOUNTER
Ordered CT of his T-spine. Please let pt know that they need this prior to making appt.      Electronically signed by Daya Springer MD on 3/28/2022 at 3:20 PM

## 2022-03-28 NOTE — TELEPHONE ENCOUNTER
Dr. Boston Nieves received referral on patient today, but they require that either a CT or an MRI be done on his thoracic spine prior to them making an appointment.

## 2022-04-04 ENCOUNTER — OFFICE VISIT (OUTPATIENT)
Dept: FAMILY MEDICINE CLINIC | Age: 47
End: 2022-04-04
Payer: COMMERCIAL

## 2022-04-04 ENCOUNTER — HOSPITAL ENCOUNTER (OUTPATIENT)
Age: 47
Discharge: HOME | End: 2022-04-06
Payer: COMMERCIAL

## 2022-04-04 ENCOUNTER — HOSPITAL ENCOUNTER (OUTPATIENT)
Dept: GENERAL RADIOLOGY | Age: 47
Discharge: HOME | End: 2022-04-06
Payer: COMMERCIAL

## 2022-04-04 VITALS
WEIGHT: 291.4 LBS | BODY MASS INDEX: 43.16 KG/M2 | HEART RATE: 89 BPM | DIASTOLIC BLOOD PRESSURE: 92 MMHG | HEIGHT: 69 IN | SYSTOLIC BLOOD PRESSURE: 155 MMHG | OXYGEN SATURATION: 98 % | TEMPERATURE: 97.8 F

## 2022-04-04 DIAGNOSIS — N52.9 ERECTILE DYSFUNCTION, UNSPECIFIED ERECTILE DYSFUNCTION TYPE: ICD-10-CM

## 2022-04-04 DIAGNOSIS — S22.069D CLOSED FRACTURE OF EIGHTH THORACIC VERTEBRA WITH ROUTINE HEALING, UNSPECIFIED FRACTURE MORPHOLOGY, SUBSEQUENT ENCOUNTER: ICD-10-CM

## 2022-04-04 DIAGNOSIS — I10 ESSENTIAL HYPERTENSION: ICD-10-CM

## 2022-04-04 DIAGNOSIS — R35.0 URINARY FREQUENCY: Primary | ICD-10-CM

## 2022-04-04 DIAGNOSIS — M54.16 LUMBAR BACK PAIN WITH RADICULOPATHY AFFECTING RIGHT LOWER EXTREMITY: ICD-10-CM

## 2022-04-04 PROCEDURE — G8427 DOCREV CUR MEDS BY ELIG CLIN: HCPCS | Performed by: FAMILY MEDICINE

## 2022-04-04 PROCEDURE — 72072 X-RAY EXAM THORAC SPINE 3VWS: CPT

## 2022-04-04 PROCEDURE — 72100 X-RAY EXAM L-S SPINE 2/3 VWS: CPT

## 2022-04-04 PROCEDURE — G8417 CALC BMI ABV UP PARAM F/U: HCPCS | Performed by: FAMILY MEDICINE

## 2022-04-04 PROCEDURE — 1036F TOBACCO NON-USER: CPT | Performed by: FAMILY MEDICINE

## 2022-04-04 PROCEDURE — 99214 OFFICE O/P EST MOD 30 MIN: CPT | Performed by: FAMILY MEDICINE

## 2022-04-04 RX ORDER — GABAPENTIN 800 MG/1
800 TABLET ORAL 2 TIMES DAILY
Qty: 60 TABLET | Refills: 1 | Status: SHIPPED
Start: 2022-04-04 | End: 2022-07-11 | Stop reason: SDUPTHER

## 2022-04-04 RX ORDER — SILDENAFIL 50 MG/1
50 TABLET, FILM COATED ORAL DAILY PRN
Qty: 30 TABLET | Refills: 0 | Status: SHIPPED | OUTPATIENT
Start: 2022-04-04 | End: 2022-05-23 | Stop reason: SDUPTHER

## 2022-04-04 RX ORDER — HYDROCHLOROTHIAZIDE 50 MG/1
50 TABLET ORAL DAILY
Qty: 90 TABLET | Refills: 1 | Status: SHIPPED
Start: 2022-04-04 | End: 2022-10-12 | Stop reason: SDUPTHER

## 2022-04-04 RX ORDER — LISINOPRIL 40 MG/1
40 TABLET ORAL DAILY
Qty: 90 TABLET | Refills: 1 | Status: SHIPPED
Start: 2022-04-04 | End: 2022-10-12 | Stop reason: SDUPTHER

## 2022-04-04 ASSESSMENT — ENCOUNTER SYMPTOMS
TROUBLE SWALLOWING: 0
WHEEZING: 0
SHORTNESS OF BREATH: 0
CONSTIPATION: 0
CHEST TIGHTNESS: 0
NAUSEA: 0
COUGH: 0
SORE THROAT: 0
BACK PAIN: 0
DIARRHEA: 0
VOMITING: 0

## 2022-04-04 NOTE — PROGRESS NOTES
S: 49 y.o. male with   Chief Complaint   Patient presents with    Urinary Frequency     for about 2 weeks, both parents are diabetics he is concerned he has it also        Urinary frequency  -new issue  -been going on for about 2 weeks  -having nocturia and some frequency during the day also  -when he urinates it is a large amount      ED  -can achieve erections however having trouble maintaining     Neuropathy  -f/u  -on gabapentin 400 BID, was taking this TID to control his pain   -secondary to radiculopathy     O: VS:  height is 5' 9\" (1.753 m) and weight is 291 lb 6.4 oz (132.2 kg). His temporal temperature is 97.8 °F (36.6 °C). His blood pressure is 155/92 (abnormal) and his pulse is 89. His oxygen saturation is 98%. BP Readings from Last 3 Encounters:   04/04/22 (!) 155/92   03/25/22 (!) 145/89   01/20/22 127/87     See resident note      Impression/Plan:   1) Urinary frequency - PSA, hemoglobin a1c  2) ED - viagra  3) Neuropathy - increase gabapentin  4) HTN - increase lisinopril and HCTZ       Health Maintenance Due   Topic Date Due    DTaP/Tdap/Td vaccine (1 - Tdap) Never done    Colorectal Cancer Screen  Never done         Attending Physician Statement  I have discussed the case, including pertinent history and exam findings with the resident. I agree with the documented assessment and plan.       Dayna Sosa DO

## 2022-04-04 NOTE — PROGRESS NOTES
4/4/2022    Therese Trimble is a 49 y.o. malehere for:    HPI:    Urinary Frequency: Has been having increased frequency. He is concerned about diabetes because he has a strong family history of diabetes. His A1c in the fall was normal and he understands this. Not having really increased thirst.  He is on a diuretic for his hypertension. Also is having some occasional feelings of incomplete emptying although most the time when he voids it is large volume void. He does work occasional night shifts. He is also having some erectile dysfunction where she is having trouble maintaining erection. He is still getting morning erections and is able to achieve erection when necessary however it is hard to maintain. HTN: Has not been taking his blood pressure medicine as prescribed. He is only taking half the dose once a day instead of the full dose which was supposed to be twice a day. Neuropathy: He restarted the gabapentin at 400 mg however he was requiring 3-4 times a day to get good relief. He was on 800 mg when he was in prison. He would like ideally to be on a higher dose and less frequency to avoid frequent pills. He is working on getting the imaging for neurosurgery and pain management to work on this. COVID Vaccine Status: Moderna  BP Readings from Last 3 Encounters:   04/04/22 (!) 155/92   03/25/22 (!) 145/89   01/20/22 127/87     Current Outpatient Medications   Medication Sig Dispense Refill    lisinopril (PRINIVIL;ZESTRIL) 40 MG tablet Take 1 tablet by mouth daily 90 tablet 1    hydroCHLOROthiazide (HYDRODIURIL) 50 MG tablet Take 1 tablet by mouth daily 90 tablet 1    gabapentin (NEURONTIN) 800 MG tablet Take 1 tablet by mouth 2 times daily for 60 days.  60 tablet 1    sildenafil (VIAGRA) 50 MG tablet Take 1 tablet by mouth daily as needed for Erectile Dysfunction 30 tablet 0    clobetasol propionate 0.05 % LOTN lotion Apply to affected area 2 times daily 118 mL 3    clobetasol (OLUX) 0.05 % foam APPLY TOPICALLY TO SCALP 2 TIMES DAILY 100 g 3    ondansetron (ZOFRAN-ODT) 4 MG disintegrating tablet Take 1 tablet by mouth 3 times daily as needed for Nausea or Vomiting 21 tablet 0    atorvastatin (LIPITOR) 10 MG tablet Take 1 tablet by mouth daily 90 tablet 1    buprenorphine-naloxone (SUBOXONE) 8-2 MG FILM SL film dissolve 2 FILMS under the tongue once daily      aspirin 81 MG EC tablet Take 81 mg by mouth daily      Elastic Bandages & Supports (NEOPRENE KNEE BRACE) MISC Apply to right knee daily 1 each 0     No current facility-administered medications for this visit. Allergies   Allergen Reactions    Morphine Nausea And Vomiting    Penicillins Rash       Past Medical & Surgical History:      Diagnosis Date    Chronic back pain 2011    Eczema     Essential hypertension      Past Surgical History:   Procedure Laterality Date    ANKLE SURGERY      ORIF on Rt ankle       Family History:      Problem Relation Age of Onset    High Blood Pressure Mother     Diabetes Father     Heart Disease Father     High Blood Pressure Father     COPD Father     Diabetes Brother     Diabetes Paternal Grandmother     Cancer Paternal Grandmother         Unknown    Prostate Cancer Paternal Grandfather        Social History:  Social History     Tobacco Use    Smoking status: Former Smoker     Packs/day: 2.00     Years: 7.00     Pack years: 14.00     Types: Cigarettes     Quit date:      Years since quittin.2    Smokeless tobacco: Never Used   Substance Use Topics    Alcohol use: No     Comment: Quit since        Immunization History   Administered Date(s) Administered    COVID-19, Alexa File, Primary or Immunocompromised, PF, 100mcg/0.5mL 2021, 2021, 09/15/2021    Influenza, MDCK Quadv, IM, PF (Flucelvax 2 yrs and older) 10/18/2021       Review of Systems   Constitutional: Negative for chills and fever. HENT: Negative for sore throat and trouble swallowing. to a 3 medication regimen for now  - lisinopril (PRINIVIL;ZESTRIL) 40 MG tablet; Take 1 tablet by mouth daily  Dispense: 90 tablet; Refill: 1  - hydroCHLOROthiazide (HYDRODIURIL) 50 MG tablet; Take 1 tablet by mouth daily  Dispense: 90 tablet; Refill: 1    3. Closed fracture of eighth thoracic vertebra with routine healing, unspecified fracture morphology, subsequent encounter  He has scheduled his CT of his spine  Increase gabapentin to previous dose of 800 mg twice a day  He was on this in prison and did well with it  - gabapentin (NEURONTIN) 800 MG tablet; Take 1 tablet by mouth 2 times daily for 60 days. Dispense: 60 tablet; Refill: 1    4. Lumbar back pain with radiculopathy affecting right lower extremity  As above  After imaging is following up with neurosurgery and pain management as well  - gabapentin (NEURONTIN) 800 MG tablet; Take 1 tablet by mouth 2 times daily for 60 days. Dispense: 60 tablet; Refill: 1    5. Erectile dysfunction, unspecified erectile dysfunction type  We will start as needed Viagra  No contraindications to this in his history or medications  Likely stemming from blood pressure as well as neuropathy  Screening and prostate component as above  - sildenafil (VIAGRA) 50 MG tablet; Take 1 tablet by mouth daily as needed for Erectile Dysfunction  Dispense: 30 tablet; Refill: 0      Follow up:  2 months    Patient agrees with the above stated plan.     Evonne Laws MD  PGY-3 Family Medicine

## 2022-04-25 ENCOUNTER — HOSPITAL ENCOUNTER (OUTPATIENT)
Dept: CT IMAGING | Age: 47
Discharge: HOME | End: 2022-04-27
Payer: COMMERCIAL

## 2022-04-25 DIAGNOSIS — S22.060S COMPRESSION FRACTURE OF T8 VERTEBRA, SEQUELA: ICD-10-CM

## 2022-04-25 PROCEDURE — 72128 CT CHEST SPINE W/O DYE: CPT

## 2022-05-19 ENCOUNTER — TELEPHONE (OUTPATIENT)
Dept: FAMILY MEDICINE CLINIC | Age: 47
End: 2022-05-19

## 2022-05-19 ENCOUNTER — OFFICE VISIT (OUTPATIENT)
Dept: PRIMARY CARE CLINIC | Age: 47
End: 2022-05-19
Payer: COMMERCIAL

## 2022-05-19 VITALS
TEMPERATURE: 98.2 F | HEART RATE: 92 BPM | BODY MASS INDEX: 42.36 KG/M2 | SYSTOLIC BLOOD PRESSURE: 138 MMHG | WEIGHT: 286 LBS | OXYGEN SATURATION: 95 % | RESPIRATION RATE: 16 BRPM | DIASTOLIC BLOOD PRESSURE: 86 MMHG | HEIGHT: 69 IN

## 2022-05-19 DIAGNOSIS — J40 BRONCHITIS: ICD-10-CM

## 2022-05-19 DIAGNOSIS — B34.9 VIRAL ILLNESS: Primary | ICD-10-CM

## 2022-05-19 LAB
INFLUENZA A ANTIBODY: NEGATIVE
INFLUENZA B ANTIBODY: NEGATIVE
Lab: NORMAL
PERFORMING INSTRUMENT: NORMAL
QC PASS/FAIL: NORMAL
SARS-COV-2, POC: NORMAL

## 2022-05-19 PROCEDURE — G8417 CALC BMI ABV UP PARAM F/U: HCPCS | Performed by: NURSE PRACTITIONER

## 2022-05-19 PROCEDURE — 87426 SARSCOV CORONAVIRUS AG IA: CPT | Performed by: NURSE PRACTITIONER

## 2022-05-19 PROCEDURE — G8427 DOCREV CUR MEDS BY ELIG CLIN: HCPCS | Performed by: NURSE PRACTITIONER

## 2022-05-19 PROCEDURE — 99213 OFFICE O/P EST LOW 20 MIN: CPT | Performed by: NURSE PRACTITIONER

## 2022-05-19 PROCEDURE — 87804 INFLUENZA ASSAY W/OPTIC: CPT | Performed by: NURSE PRACTITIONER

## 2022-05-19 PROCEDURE — 1036F TOBACCO NON-USER: CPT | Performed by: NURSE PRACTITIONER

## 2022-05-19 RX ORDER — BROMPHENIRAMINE MALEATE, PSEUDOEPHEDRINE HYDROCHLORIDE, AND DEXTROMETHORPHAN HYDROBROMIDE 2; 30; 10 MG/5ML; MG/5ML; MG/5ML
5 SYRUP ORAL 4 TIMES DAILY PRN
Qty: 473 ML | Refills: 0 | Status: SHIPPED
Start: 2022-05-19 | End: 2022-07-11 | Stop reason: ALTCHOICE

## 2022-05-19 RX ORDER — PREDNISONE 20 MG/1
20 TABLET ORAL 2 TIMES DAILY
Qty: 10 TABLET | Refills: 0 | Status: SHIPPED | OUTPATIENT
Start: 2022-05-19 | End: 2022-05-24

## 2022-05-19 RX ORDER — DOXYCYCLINE HYCLATE 100 MG/1
100 CAPSULE ORAL 2 TIMES DAILY
Qty: 20 CAPSULE | Refills: 0 | Status: SHIPPED | OUTPATIENT
Start: 2022-05-19 | End: 2022-05-29

## 2022-05-19 RX ORDER — BENZONATATE 100 MG/1
100 CAPSULE ORAL 3 TIMES DAILY PRN
Qty: 30 CAPSULE | Refills: 0 | Status: SHIPPED
Start: 2022-05-19 | End: 2022-05-23 | Stop reason: SDUPTHER

## 2022-05-19 NOTE — PATIENT INSTRUCTIONS
Patient Education        Bronchitis: Care Instructions  Your Care Instructions     Bronchitis is inflammation of the bronchial tubes, which carry air to the lungs. The tubes swell and produce mucus, or phlegm. The mucus and inflamedbronchial tubes make you cough. You may have trouble breathing. Most cases of bronchitis are caused by viruses like those that cause colds. Antibiotics usually do not help and they may be harmful. Bronchitis usually develops rapidly and lasts about 2 to 3 weeks in otherwisehealthy people. Follow-up care is a key part of your treatment and safety. Be sure to make and go to all appointments, and call your doctor if you are having problems. It's also a good idea to know your test results and keep alist of the medicines you take. How can you care for yourself at home?  Take all medicines exactly as prescribed. Call your doctor if you think you are having a problem with your medicine.  Get some extra rest.   Take an over-the-counter pain medicine, such as acetaminophen (Tylenol), ibuprofen (Advil, Motrin), or naproxen (Aleve) to reduce fever and relieve body aches. Read and follow all instructions on the label.  Do not take two or more pain medicines at the same time unless the doctor told you to. Many pain medicines have acetaminophen, which is Tylenol. Too much acetaminophen (Tylenol) can be harmful.  Take an over-the-counter cough medicine to help quiet a dry, hacking cough so that you can sleep. Avoid cough medicines that have more than one active ingredient. Read and follow all instructions on the label.  Do not smoke. Smoking can make bronchitis worse. If you need help quitting, talk to your doctor about stop-smoking programs and medicines. These can increase your chances of quitting for good. When should you call for help? Call 911 anytime you think you may need emergency care. For example, call if:     You have severe trouble breathing.    Call your doctor now or seek immediate medical care if:     You have new or worse trouble breathing.      You cough up dark brown or bloody mucus (sputum).      You have a new or higher fever.      You have a new rash. Watch closely for changes in your health, and be sure to contact your doctor if:     You cough more deeply or more often, especially if you notice more mucus or a change in the color of your mucus.      You are not getting better as expected. Where can you learn more? Go to https://Orthohub.SeeSaw Networks. org and sign in to your ISVS account. Enter H333 in the [employer] box to learn more about \"Bronchitis: Care Instructions. \"     If you do not have an account, please click on the \"Sign Up Now\" link. Current as of: July 6, 2021               Content Version: 13.2  © 2006-2022 [employer]. Care instructions adapted under license by [facility] ([facility]). If you have questions about a medical condition or this instruction, always ask your healthcare professional. [place] [address] any warranty or liability for your use of this information. Patient Education        Viral Infections: Care Instructions  Overview     You don't feel well, but it's not clear what's causing it. You may have a viral infection. Viruses cause many illnesses, such as the common cold, influenza, fever, rashes, and the diarrhea, nausea, and vomiting that are symptoms of a stomach infection. You may wonder if antibiotic medicines could make you feel better. But antibiotics only treat infections caused by bacteria. They don'twork on viruses. The good news is that viral infections usually aren't serious. Most will go away in a few days without medical treatment. In the meantime, there are a fewthings you can do to make yourself more comfortable. Follow-up care is a key part of your treatment and safety.  Be sure to make and go to all appointments, and call your doctor if you are having problems. It's also a good idea to know your test results and keep alist of the medicines you take. How can you care for yourself at home?  Get plenty of rest if you feel tired.  Take an over-the-counter pain medicine if needed, such as acetaminophen (Tylenol), ibuprofen (Advil, Motrin), or naproxen (Aleve). Read and follow all instructions on the label.  Be careful when taking over-the-counter cold or flu medicines and Tylenol at the same time. Many of these medicines have acetaminophen, which is Tylenol. Read the labels to make sure that you are not taking more than the recommended dose. Too much acetaminophen (Tylenol) can be harmful.  Drink plenty of fluids. If you have kidney, heart, or liver disease and have to limit fluids, talk with your doctor before you increase the amount of fluids you drink.  Stay home from work, school, and other public places while you have a fever. When should you call for help? Call 911 anytime you think you may need emergency care. For example, call if:     You have severe trouble breathing.      You passed out (lost consciousness). Call your doctor now or seek immediate medical care if:     You seem to be getting much sicker.      You have a new or higher fever.      You have blood in your stools.      You have new belly pain, or your pain gets worse.      You have a new rash. Watch closely for changes in your health, and be sure to contact your doctor if:     You start to get better and then get worse.      You do not get better as expected. Where can you learn more? Go to https://Restaurant Revolution TechnologiesestellaAffinity.Somero Enterprises. org and sign in to your BuildForge account. Enter E278 in the [facility] box to learn more about \"Viral Infections: Care Instructions. \"     If you do not have an account, please click on the \"Sign Up Now\" link. Current as of: July 1, 2021               Content Version: 13.2  © 6697-8555 Healthwise, [employer].    Care instructions adapted under license by [facility] ([facility]). If you have questions about a medical condition or this instruction, always ask your healthcare professional. [address] any warranty or liability for your use of this information.

## 2022-05-19 NOTE — LETTER
[facility] [place]  [address] [place] [address]  Phone: [phone]  Fax: [address] [facility], CARIDAD - CNP        May 19, 2022     Patient: Sony Belcher   YOB: 1975   Date of Visit: 5/19/2022       To Whom it May Concern:    Naveed Edgar was seen in my clinic on 5/19/2022. He may return to work on 5/22/2022. Please excuse him from 5/18/2022-5/21/2022. If you have any questions or concerns, please don't hesitate to call.     Sincerely,         CARIDAD Morales - CNP

## 2022-05-19 NOTE — PROGRESS NOTES
Chief Complaint:   Nasal Congestion (x 4 days. Feels fatigued, wheezing, productive cough with brown dischrage. Has been taking Muccinex x 4 days. Has some SOB.) and Fever (States he has been having fever and chills. )      History of Present Illness   Source of history provided by:  patient. Nichole Bernal is a 49 y.o. old male with a past medical history of:   Past Medical History:   Diagnosis Date    Chronic back pain 8/2/2011    Eczema     Essential hypertension     Presents to the [facility] care for evaluation of sinus pressure, nasal congestion, discolored nasal drainage, bilateral ear pressure, mild productive cough and sore throat x 4 days. Subjective fever noted. Has been taking mucinex OTC without relief. Denies any fever, chills, wheezing, CP, SOB, or GI symptoms. Denies any hx of asthma, COPD. Positive for tobacco use. ROS    Unless otherwise stated in this report or unable to obtain because of the patient's clinical or mental status as evidenced by the medical record, this patients's positive and negative responses for Review of Systems, constitutional, psych, eyes, ENT, cardiovascular, respiratory, gastrointestinal, neurological, genitourinary, musculoskeletal, integument systems and systems related to the presenting problem are either stated in the preceding or were not pertinent or were negative for the symptoms and/or complaints related to the medical problem. Past Surgical History:  has a past surgical history that includes Ankle surgery (2009). Social History:  reports that he quit smoking about 11 years ago. His smoking use included cigarettes. He has a 14.00 pack-year smoking history. He has never used smokeless tobacco. He reports previous drug use. He reports that he does not drink alcohol.   Family History: family history includes COPD in his father; Cancer in his paternal grandmother; Diabetes in his brother, father, and paternal grandmother; Heart Disease in his father; High Blood Pressure in his father and mother; Prostate Cancer in his paternal grandfather. Allergies: Morphine and Penicillins    Physical Exam         VS:  /86 (Site: Right Upper Arm, Position: Sitting, Cuff Size: Large Adult)   Pulse 92   Temp 98.2 °F (36.8 °C) (Temporal)   Resp 16   Ht 5' 9\" (1.753 m)   Wt 286 lb (129.7 kg)   SpO2 95%   BMI 42.23 kg/m²    Oxygen Saturation Interpretation: Normal.    Constitutional:  Alert, development consistent with age. Head: mild TTP over the  sinuses. Ears:  External Ears: Bilateral pinna normal. TMs  without erythema or perforation bilaterally. Canals normal bilaterally without swelling or exudate  Nose:  moderate congestion of the nasal mucosa. There is mild injection to middle turbinates bilaterally. Throat: moderate posterior pharyngeal erythema with mild post nasal drip present. No exudate or tonsillar hypertrophy noted. Neck:  Supple. There is mild anterior cervical adenopathy. Lungs: CTAB without wheezes, rales, or rhonchi  Heart:  Regular rate and rhythm, normal heart sounds, without pathological murmurs, ectopy, gallops, or rubs. Skin:  Normal turgor. Warm, dry, without visible rash. Neurological:  Alert and oriented. Motor functions intact. Responds to verbal commands. Lab / Imaging Results   (All laboratory and radiology results have been personally reviewed by myself)  Labs:  No results found for this visit on 05/19/22. Assessment / Plan     Impression(s):    1. Viral illness    - POCT COVID-19, Antigen  - brompheniramine-pseudoephedrine-DM 2-30-10 MG/5ML syrup; Take 5 mLs by mouth 4 times daily as needed for Congestion or Cough  Dispense: 473 mL; Refill: 0  - doxycycline hyclate (VIBRAMYCIN) 100 MG capsule; Take 1 capsule by mouth 2 times daily for 10 days  Dispense: 20 capsule; Refill: 0  - predniSONE (DELTASONE) 20 MG tablet; Take 1 tablet by mouth 2 times daily for 5 days  Dispense: 10 tablet;  Refill: 0  - benzonatate (TESSALON PERLES) 100 MG capsule; Take 1 capsule by mouth 3 times daily as needed for Cough  Dispense: 30 capsule; Refill: 0  - POCT Influenza A/B    2. Bronchitis    - brompheniramine-pseudoephedrine-DM 2-30-10 MG/5ML syrup; Take 5 mLs by mouth 4 times daily as needed for Congestion or Cough  Dispense: 473 mL; Refill: 0  - doxycycline hyclate (VIBRAMYCIN) 100 MG capsule; Take 1 capsule by mouth 2 times daily for 10 days  Dispense: 20 capsule; Refill: 0  - predniSONE (DELTASONE) 20 MG tablet; Take 1 tablet by mouth 2 times daily for 5 days  Dispense: 10 tablet; Refill: 0  - benzonatate (TESSALON PERLES) 100 MG capsule; Take 1 capsule by mouth 3 times daily as needed for Cough  Dispense: 30 capsule; Refill: 0        Script written for as above, side effects discussed. Increase fluids and rest. Symptomatic relief discussed. F/u PCP in 5-7 days if symptoms persist. ED sooner if symptoms worsen or change. Red flag symptoms discussed. Pt is in agreement with this care plan. All questions answered.      Jorge Milian, [facility] Nurse Practitioner

## 2022-05-23 ENCOUNTER — OFFICE VISIT (OUTPATIENT)
Dept: FAMILY MEDICINE CLINIC | Age: 47
End: 2022-05-23
Payer: COMMERCIAL

## 2022-05-23 VITALS
HEART RATE: 90 BPM | TEMPERATURE: 97.7 F | WEIGHT: 279.4 LBS | BODY MASS INDEX: 41.38 KG/M2 | DIASTOLIC BLOOD PRESSURE: 99 MMHG | OXYGEN SATURATION: 97 % | SYSTOLIC BLOOD PRESSURE: 160 MMHG | HEIGHT: 69 IN

## 2022-05-23 DIAGNOSIS — B34.9 VIRAL ILLNESS: Primary | ICD-10-CM

## 2022-05-23 DIAGNOSIS — N52.9 ERECTILE DYSFUNCTION, UNSPECIFIED ERECTILE DYSFUNCTION TYPE: ICD-10-CM

## 2022-05-23 DIAGNOSIS — R53.83 FATIGUE, UNSPECIFIED TYPE: ICD-10-CM

## 2022-05-23 DIAGNOSIS — F99 PSYCHIATRIC DISORDER: ICD-10-CM

## 2022-05-23 DIAGNOSIS — J40 BRONCHITIS: ICD-10-CM

## 2022-05-23 PROCEDURE — 99214 OFFICE O/P EST MOD 30 MIN: CPT | Performed by: FAMILY MEDICINE

## 2022-05-23 PROCEDURE — G8417 CALC BMI ABV UP PARAM F/U: HCPCS | Performed by: FAMILY MEDICINE

## 2022-05-23 PROCEDURE — G8427 DOCREV CUR MEDS BY ELIG CLIN: HCPCS | Performed by: FAMILY MEDICINE

## 2022-05-23 PROCEDURE — 1036F TOBACCO NON-USER: CPT | Performed by: FAMILY MEDICINE

## 2022-05-23 RX ORDER — QUETIAPINE FUMARATE 100 MG/1
100 TABLET, FILM COATED ORAL NIGHTLY
Qty: 14 TABLET | Refills: 0 | Status: SHIPPED | OUTPATIENT
Start: 2022-05-23

## 2022-05-23 RX ORDER — BENZONATATE 100 MG/1
200 CAPSULE ORAL 3 TIMES DAILY PRN
Qty: 60 CAPSULE | Refills: 0 | Status: SHIPPED | OUTPATIENT
Start: 2022-05-23 | End: 2022-05-30

## 2022-05-23 RX ORDER — SILDENAFIL 50 MG/1
50 TABLET, FILM COATED ORAL DAILY PRN
Qty: 30 TABLET | Refills: 0 | Status: SHIPPED
Start: 2022-05-23 | End: 2022-07-10 | Stop reason: SDUPTHER

## 2022-05-23 ASSESSMENT — ENCOUNTER SYMPTOMS
VOMITING: 0
TROUBLE SWALLOWING: 0
NAUSEA: 0
CHEST TIGHTNESS: 0
SHORTNESS OF BREATH: 0
SORE THROAT: 0
BACK PAIN: 0
CONSTIPATION: 0
DIARRHEA: 0
WHEEZING: 0
COUGH: 1

## 2022-05-23 NOTE — PROGRESS NOTES
5/23/2022    Sony Belcher is a 49 y.o. malehere for:    HPI:    ED: He is interested in adding a testosterone work-up as he continues to have issues with erectile dysfunction. He is feeling more fatigued than usual although he is unsure if this is related to recent illness or if the fatigue has been more ongoing. He is on Suboxone as well. The Viagra was working for him well. Does need refill on this    URI: He was seen at a walk-in clinic last week and was actually started on steroids as well as antibiotics and cough suppressants. He does need a refill of the [employer]. He is getting slowly better although the cough continues to be annoying. Psych: He has between psych providers and is working on establishing with a new one next week. Does require refill of his Seroquel which he was on 100 mg nightly. He is also on Adderall per psych however does not need a refill of this at that time.       BP Readings from Last 3 Encounters:   05/23/22 (!) 160/99   05/19/22 138/86   04/04/22 (!) 155/92     Current Outpatient Medications   Medication Sig Dispense Refill    benzonatate (TESSALON PERLES) 100 MG capsule Take 2 capsules by mouth 3 times daily as needed for Cough 60 capsule 0    QUEtiapine (SEROQUEL) 100 MG tablet Take 1 tablet by mouth nightly 14 tablet 0    sildenafil (VIAGRA) 50 MG tablet Take 1 tablet by mouth daily as needed for Erectile Dysfunction 30 tablet 0    brompheniramine-pseudoephedrine-DM 2-30-10 MG/5ML syrup Take 5 mLs by mouth 4 times daily as needed for Congestion or Cough 473 mL 0    doxycycline hyclate (VIBRAMYCIN) 100 MG capsule Take 1 capsule by mouth 2 times daily for 10 days 20 capsule 0    predniSONE (DELTASONE) 20 MG tablet Take 1 tablet by mouth 2 times daily for 5 days 10 tablet 0    lisinopril (PRINIVIL;ZESTRIL) 40 MG tablet Take 1 tablet by mouth daily 90 tablet 1    hydroCHLOROthiazide (HYDRODIURIL) 50 MG tablet Take 1 tablet by mouth daily 90 tablet 1  gabapentin (NEURONTIN) 800 MG tablet Take 1 tablet by mouth 2 times daily for 60 days. 60 tablet 1    clobetasol propionate 0.05 % LOTN lotion Apply to affected area 2 times daily 118 mL 3    clobetasol (OLUX) 0.05 % foam APPLY TOPICALLY TO SCALP 2 TIMES DAILY 100 g 3    ondansetron (ZOFRAN-ODT) 4 MG disintegrating tablet Take 1 tablet by mouth 3 times daily as needed for Nausea or Vomiting 21 tablet 0    atorvastatin (LIPITOR) 10 MG tablet Take 1 tablet by mouth daily 90 tablet 1    buprenorphine-naloxone (SUBOXONE) 8-2 MG FILM SL film dissolve 2 FILMS under the tongue once daily      aspirin 81 MG EC tablet Take 81 mg by mouth daily      Elastic Bandages & Supports (NEOPRENE KNEE BRACE) MISC Apply to right knee daily 1 each 0     No current facility-administered medications for this visit.       Allergies   Allergen Reactions    Morphine Nausea And Vomiting    Penicillins Rash       Past Medical & Surgical History:      Diagnosis Date    Chronic back pain 2011    Eczema     Essential hypertension      Past Surgical History:   Procedure Laterality Date    ANKLE SURGERY      ORIF on Rt ankle       Family History:      Problem Relation Age of Onset    High Blood Pressure Mother     Diabetes Father     Heart Disease Father     High Blood Pressure Father     COPD Father     Diabetes Brother     Diabetes Paternal Grandmother     Cancer Paternal Grandmother         Unknown    Prostate Cancer Paternal Grandfather        Social History:  Social History     Tobacco Use    Smoking status: Former Smoker     Packs/day: 2.00     Years: 7.00     Pack years: 14.00     Types: Cigarettes     Quit date:      Years since quittin.3    Smokeless tobacco: Never Used   Substance Use Topics    Alcohol use: No     Comment: Quit since        Immunization History   Administered Date(s) Administered    VERONIKAID-19, Anirudh Salgado, Primary or Immunocompromised, PF, 100mcg/0.5mL 2021, 2021, 09/15/2021    Influenza, MDCK Quadv, IM, PF (Flucelvax 2 yrs and older) 10/18/2021       Review of Systems   Constitutional: Negative for chills and fever. HENT: Positive for congestion and postnasal drip. Negative for sore throat and trouble swallowing. Respiratory: Positive for cough. Negative for chest tightness, shortness of breath and wheezing. Cardiovascular: Negative for chest pain, palpitations and leg swelling. Gastrointestinal: Negative for constipation, diarrhea, nausea and vomiting. Musculoskeletal: Negative for back pain. Neurological: Negative for dizziness, light-headedness and headaches. VS:  BP (!) 160/99   Pulse 90   Temp 97.7 °F (36.5 °C) (Temporal)   Ht 5' 9\" (1.753 m)   Wt 279 lb 6.4 oz (126.7 kg)   SpO2 97%   BMI 41.26 kg/m²     Physical Exam  Vitals reviewed. Constitutional:       General: He is not in acute distress. Appearance: Normal appearance. He is not ill-appearing. HENT:      Head: Normocephalic and atraumatic. Cardiovascular:      Rate and Rhythm: Normal rate and regular rhythm. Pulses: Normal pulses. Heart sounds: Normal heart sounds. No murmur heard. Pulmonary:      Effort: Pulmonary effort is normal. No respiratory distress. Breath sounds: No wheezing, rhonchi or rales. Musculoskeletal:         General: No swelling. Normal range of motion. Cervical back: Normal range of motion. No rigidity. Right lower leg: No edema. Left lower leg: No edema. Skin:     General: Skin is warm and dry. Neurological:      General: No focal deficit present. Mental Status: He is alert. Motor: No weakness. Assessment/Plan:    1. Viral illness  Refill Tessalon Perles  Complete course of antibiotics although they were likely not appropriate in the first place  Complete steroids  - benzonatate (TESSALON PERLES) 100 MG capsule;  Take 2 capsules by mouth 3 times daily as needed for Cough  Dispense: 60 capsule;

## 2022-05-23 NOTE — PROGRESS NOTES
S: 49 y.o. male with   Chief Complaint   Patient presents with    Cough       COugh  -urgent care follow up  -given doxy and steroids  -cough improving somewhat, still having this    Erectile dysfunction  -f/u  -responded well to viagra    Mood  -between psych providers, needs refill on seroquel    O: VS:  height is 5' 9\" (1.753 m) and weight is 279 lb 6.4 oz (126.7 kg). His temporal temperature is 97.7 °F (36.5 °C). His blood pressure is 160/99 (abnormal) and his pulse is 90. His oxygen saturation is 97%. BP Readings from Last 3 Encounters:   05/23/22 (!) 160/99   05/19/22 138/86   04/04/22 (!) 155/92     See resident note  Lungs CTAB     Impression/Plan:   1) URI - refill tessalon  2) ED - check testosterone level  3) Mood disorder - refill seroquel       Health Maintenance Due   Topic Date Due    DTaP/Tdap/Td vaccine (1 - Tdap) Never done    Colorectal Cancer Screen  Never done         Attending Physician Statement  I have discussed the case, including pertinent history and exam findings with the resident. I agree with the documented assessment and plan.       Luis Alfonso,

## 2022-06-26 DIAGNOSIS — L30.9 ECZEMA, UNSPECIFIED TYPE: ICD-10-CM

## 2022-06-27 RX ORDER — CLOBETASOL PROPIONATE 0.5 MG/ML
LOTION TOPICAL
Qty: 118 ML | Refills: 3 | Status: SHIPPED
Start: 2022-06-27 | End: 2022-10-12 | Stop reason: SDUPTHER

## 2022-07-07 DIAGNOSIS — S22.069D CLOSED FRACTURE OF EIGHTH THORACIC VERTEBRA WITH ROUTINE HEALING, UNSPECIFIED FRACTURE MORPHOLOGY, SUBSEQUENT ENCOUNTER: ICD-10-CM

## 2022-07-07 DIAGNOSIS — M54.16 LUMBAR BACK PAIN WITH RADICULOPATHY AFFECTING RIGHT LOWER EXTREMITY: ICD-10-CM

## 2022-07-08 DIAGNOSIS — L30.9 ECZEMA, UNSPECIFIED TYPE: ICD-10-CM

## 2022-07-08 DIAGNOSIS — N52.9 ERECTILE DYSFUNCTION, UNSPECIFIED ERECTILE DYSFUNCTION TYPE: ICD-10-CM

## 2022-07-08 NOTE — TELEPHONE ENCOUNTER
Pt requesting refills:      To [facility] on [address]  - Gabapentin 800mg    To [place] 50mg

## 2022-07-10 RX ORDER — SILDENAFIL 50 MG/1
50 TABLET, FILM COATED ORAL DAILY PRN
Qty: 30 TABLET | Refills: 0 | Status: SHIPPED | OUTPATIENT
Start: 2022-07-10

## 2022-07-11 ENCOUNTER — OFFICE VISIT (OUTPATIENT)
Dept: FAMILY MEDICINE CLINIC | Age: 47
End: 2022-07-11
Payer: COMMERCIAL

## 2022-07-11 VITALS
RESPIRATION RATE: 16 BRPM | DIASTOLIC BLOOD PRESSURE: 86 MMHG | SYSTOLIC BLOOD PRESSURE: 138 MMHG | BODY MASS INDEX: 39.69 KG/M2 | HEIGHT: 69 IN | TEMPERATURE: 97.8 F | OXYGEN SATURATION: 97 % | HEART RATE: 90 BPM | WEIGHT: 268 LBS

## 2022-07-11 DIAGNOSIS — M54.16 LUMBAR BACK PAIN WITH RADICULOPATHY AFFECTING RIGHT LOWER EXTREMITY: ICD-10-CM

## 2022-07-11 DIAGNOSIS — L30.9 ECZEMA, UNSPECIFIED TYPE: ICD-10-CM

## 2022-07-11 DIAGNOSIS — M54.50 ACUTE BILATERAL LOW BACK PAIN WITHOUT SCIATICA: Primary | ICD-10-CM

## 2022-07-11 DIAGNOSIS — S22.069D CLOSED FRACTURE OF EIGHTH THORACIC VERTEBRA WITH ROUTINE HEALING, UNSPECIFIED FRACTURE MORPHOLOGY, SUBSEQUENT ENCOUNTER: ICD-10-CM

## 2022-07-11 DIAGNOSIS — E66.9 CLASS 2 OBESITY WITH BODY MASS INDEX (BMI) OF 39.0 TO 39.9 IN ADULT, UNSPECIFIED OBESITY TYPE, UNSPECIFIED WHETHER SERIOUS COMORBIDITY PRESENT: ICD-10-CM

## 2022-07-11 PROBLEM — E66.812 CLASS 2 OBESITY WITH BODY MASS INDEX (BMI) OF 39.0 TO 39.9 IN ADULT: Status: ACTIVE | Noted: 2022-07-11

## 2022-07-11 PROBLEM — M54.42 ACUTE BILATERAL LOW BACK PAIN WITH LEFT-SIDED SCIATICA: Status: ACTIVE | Noted: 2022-07-11

## 2022-07-11 PROCEDURE — G8427 DOCREV CUR MEDS BY ELIG CLIN: HCPCS

## 2022-07-11 PROCEDURE — 1036F TOBACCO NON-USER: CPT

## 2022-07-11 PROCEDURE — 96372 THER/PROPH/DIAG INJ SC/IM: CPT | Performed by: FAMILY MEDICINE

## 2022-07-11 PROCEDURE — 99213 OFFICE O/P EST LOW 20 MIN: CPT

## 2022-07-11 PROCEDURE — G8417 CALC BMI ABV UP PARAM F/U: HCPCS

## 2022-07-11 RX ORDER — GABAPENTIN 800 MG/1
800 TABLET ORAL 2 TIMES DAILY
Qty: 60 TABLET | Refills: 1 | Status: SHIPPED
Start: 2022-07-11 | End: 2022-09-08 | Stop reason: SDUPTHER

## 2022-07-11 RX ORDER — CLOBETASOL PROPIONATE 0.5 MG/G
AEROSOL, FOAM TOPICAL
Qty: 100 G | Refills: 3 | Status: SHIPPED
Start: 2022-07-11 | End: 2022-10-12 | Stop reason: SDUPTHER

## 2022-07-11 RX ORDER — CYCLOBENZAPRINE HCL 10 MG
10 TABLET ORAL 3 TIMES DAILY PRN
Qty: 30 TABLET | Refills: 0 | Status: CANCELLED | OUTPATIENT
Start: 2022-07-11 | End: 2022-07-21

## 2022-07-11 RX ORDER — CYCLOBENZAPRINE HCL 10 MG
10 TABLET ORAL 3 TIMES DAILY PRN
Qty: 30 TABLET | Refills: 0 | Status: SHIPPED
Start: 2022-07-11 | End: 2022-07-11

## 2022-07-11 RX ORDER — IBUPROFEN 600 MG/1
600 TABLET ORAL 2 TIMES DAILY PRN
Qty: 360 TABLET | Refills: 1 | Status: SHIPPED | OUTPATIENT
Start: 2022-07-11

## 2022-07-11 RX ORDER — KETOROLAC TROMETHAMINE 30 MG/ML
30 INJECTION, SOLUTION INTRAMUSCULAR; INTRAVENOUS ONCE
Status: COMPLETED | OUTPATIENT
Start: 2022-07-11 | End: 2022-07-11

## 2022-07-11 RX ADMIN — KETOROLAC TROMETHAMINE 30 MG: 30 INJECTION, SOLUTION INTRAMUSCULAR; INTRAVENOUS at 15:39

## 2022-07-11 ASSESSMENT — ENCOUNTER SYMPTOMS
NAUSEA: 0
EYE PAIN: 0
SINUS PAIN: 0
SHORTNESS OF BREATH: 0
SORE THROAT: 0
CONSTIPATION: 0
RHINORRHEA: 0
DIARRHEA: 0
BACK PAIN: 1
VOMITING: 0
ABDOMINAL PAIN: 0
CHEST TIGHTNESS: 0
EYE DISCHARGE: 0
ABDOMINAL DISTENTION: 0
SINUS PRESSURE: 0

## 2022-07-11 ASSESSMENT — PATIENT HEALTH QUESTIONNAIRE - PHQ9
SUM OF ALL RESPONSES TO PHQ9 QUESTIONS 1 & 2: 0
SUM OF ALL RESPONSES TO PHQ QUESTIONS 1-9: 0
SUM OF ALL RESPONSES TO PHQ QUESTIONS 1-9: 0
2. FEELING DOWN, DEPRESSED OR HOPELESS: 0
SUM OF ALL RESPONSES TO PHQ QUESTIONS 1-9: 0
SUM OF ALL RESPONSES TO PHQ QUESTIONS 1-9: 0
1. LITTLE INTEREST OR PLEASURE IN DOING THINGS: 0

## 2022-07-11 NOTE — PROGRESS NOTES
[facility] [address]  Precepting Note    Subjective:  Lower back pain  Started after a mechanical fall  Radiation to upper back  No weakness or N/T  No fevers, chills    ROS otherwise negative     Past medical, surgical, family and social history were reviewed, non-contributory, and unchanged unless otherwise stated. Objective:    /86 (Site: Left Upper Arm, Position: Sitting, Cuff Size: Large Adult)   Pulse 90   Temp 97.8 °F (36.6 °C) (Temporal)   Resp 16   Ht 5' 9\" (1.753 m)   Wt 268 lb (121.6 kg)   SpO2 97%   BMI 39.58 kg/m²     Exam is as noted by resident with the following changes, additions or corrections:    General:  NAD; alert & oriented x 3   Heart:  RRR, no murmurs, gallops, or rubs. Lungs:  CTA bilaterally, no wheeze, rales or rhonchi  MSK: neg SLRT  Extrem:  No clubbing, cyanosis, or edema    Assessment/Plan:  Lower back pain:Secondary to medical  fall  Toradol injection today   BID prn  Muscle relaxors  F/u in 2 weeks   Attending Physician Statement  I have reviewed the chart, including any radiology or labs. I have discussed the case, including pertinent history and exam findings with the resident. I agree with the assessment, plan and orders as documented by the resident. Please refer to the resident note for additional information.       Electronically signed by Nimco Allen MD on 7/11/2022 at 3:22 PM

## 2022-07-11 NOTE — PROGRESS NOTES
[facility]  Department of Family Medicine  Family Medicine Residency Program      Patient: Michael Banks  1975 49 y.o. male     Date of Service: 7/13/2022      Chief complaint:   Chief Complaint   Patient presents with    Back Pain     Fall at work on 7/8       HISTORY OF [address] Jason Whittaker is a 49 y.o. male presented to the clinic back pain. Fall at work on Friday early morning. 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 Currently on light duty at work. Aggrevating - working, pulling. Associated symptoms - no problems, urination and bowl movements. No fever, chills. History of tingeling down the legs, controlled with Gabapentin which is out of now but has a refill request pending. ezema and psorosis throughout the back       Past Medical History:      Diagnosis Date    Chronic back pain 8/2/2011    Eczema     Essential hypertension      Past Surgical History:        Procedure Laterality Date    ANKLE SURGERY  2009    ORIF on Rt ankle     Allergies:    Morphine and Penicillins  Family History:       Problem Relation Age of Onset    High Blood Pressure Mother     Diabetes Father     Heart Disease Father     High Blood Pressure Father     COPD Father     Diabetes Brother     Diabetes Paternal Grandmother     Cancer Paternal Grandmother         Unknown    Prostate Cancer Paternal Grandfather      Review of Systems:   Review of Systems   Constitutional: Negative for activity change, appetite change, fatigue and fever. HENT: Negative for rhinorrhea, sinus pressure, sinus pain and sore throat. Eyes: Negative for pain, discharge and visual disturbance. Respiratory: Negative for chest tightness and shortness of breath. Cardiovascular: Negative for chest pain, palpitations and leg swelling. Gastrointestinal: Negative for abdominal distention, abdominal pain, constipation, diarrhea, nausea and vomiting. Genitourinary: Negative for difficulty urinating and dysuria. Musculoskeletal: Positive for back pain. Negative for gait problem and neck pain. Skin: Negative for rash and wound. Neurological: Negative for light-headedness, numbness and headaches. Psychiatric/Behavioral: Negative for sleep disturbance. PHYSICAL EXAM   Vitals: /86 (Site: Left Upper Arm, Position: Sitting, Cuff Size: Large Adult)   Pulse 90   Temp 97.8 °F (36.6 °C) (Temporal)   Resp 16   Ht 5' 9\" (1.753 m)   Wt 268 lb (121.6 kg)   SpO2 97%   BMI 39.58 kg/m²   Physical Exam  Vitals and nursing note reviewed. Constitutional:       General: He is awake. Appearance: Normal appearance. HENT:      Head: Normocephalic and atraumatic. Nose: Nose normal. No congestion or rhinorrhea. Mouth/Throat:      Mouth: Mucous membranes are moist.      Pharynx: Oropharynx is clear. Eyes:      General: No scleral icterus. Extraocular Movements: Extraocular movements intact. Conjunctiva/sclera: Conjunctivae normal.   Cardiovascular:      Rate and Rhythm: Normal rate and regular rhythm. Pulses: Normal pulses. Heart sounds: Normal heart sounds. No murmur heard. Pulmonary:      Effort: Pulmonary effort is normal. No respiratory distress. Breath sounds: Normal breath sounds. No stridor. No wheezing. Abdominal:      General: Bowel sounds are normal. There is no distension. Palpations: Abdomen is soft. There is no mass. Tenderness: There is no abdominal tenderness. There is no guarding or rebound.    Musculoskeletal:         General: Tenderness (Lower back tenderness ) present. No swelling or deformity. Normal range of motion. Cervical back: Normal range of motion and neck supple. No tenderness. Right lower leg: No edema. Left lower leg: No edema. Comments: Negative bilateral straight leg test  Range of motion of lower extremities intact, sensation intact. Neurological:      General: No focal deficit present. Mental Status: He is alert and oriented to person, place, and time. Cranial Nerves: No cranial nerve deficit. Motor: No weakness. Gait: Gait normal.   Psychiatric:         Attention and Perception: Attention normal.         Mood and Affect: Mood normal.         Behavior: Behavior normal. Behavior is cooperative. Thought Content: Thought content normal.         Judgment: Judgment normal.         Lab Results   Component Value Date    LABA1C 5.3 09/17/2021     No results found for: EAG     BP Readings from Last 3 Encounters:   07/11/22 138/86   05/23/22 (!) 160/99   05/19/22 138/86        ASSESSMENT AND PLAN     1. Acute bilateral low back pain without sciatica  Acute back pain secondary to mechanical fall at work. Consider imaging after 6 weeks if no improvement, consider PT once feeling better. Toradol injection today   OTC lidocaine patch   Muscle relaxor offered - patient states that due to his suboxone use, recently coming out of [facility] and him feeling good at where he is , does want to start flexeril. Ibuprofen 600mg - encouraged to take with food       2. Eczema, unspecified type  Medication refill   - clobetasol (OLUX) 0.05 % foam; Apply topically 2 times daily. Dispense: 100 g; Refill: 3    3. Class 2 obesity with body mass index (BMI) of 39.0 to 39.9 in adult, unspecified obesity type, unspecified whether serious comorbidity present  Encouraged to increase fruits and vegetables. Decrease fried foods, sugars and carbs. Increase physical activity as able once back pain improves.      4. Closed fracture of eighth thoracic vertebra with routine healing, unspecified fracture morphology, subsequent encounter  -stable , medication refill  - gabapentin (NEURONTIN) 800 MG tablet; Take 1 tablet by mouth 2 times daily for 60 days. Dispense: 60 tablet; Refill: 1      Counseled regarding above diagnosis, including possible risks and complications, especially if left uncontrolled. Counseled regarding the possible side effects, risks, benefits and alternatives to treatment; patient and/or guardian verbalizes understanding, agrees, feels comfortable with and wishes to proceed with above treatment plan. Call or go to ED immediately if symptoms worsen or persist. Advised patient to call with any new medication issues, and, as applicable, read all Rx info from pharmacy to assure aware of all possible risks and side effects of medication before taking. Patient and/or guardian given opportunity to ask questions/raise concerns. The patient verbalized comfort and understanding of instructions. I encourage further reading and education about your health conditions. Information on many health conditions is provided by the American Academy of Family Physicians: https://familydoctor. org/  Please bring any questions to me at your next visit. Medication List:    Current Outpatient Medications   Medication Sig Dispense Refill    clobetasol (OLUX) 0.05 % foam Apply topically 2 times daily. 100 g 3    ibuprofen (ADVIL;MOTRIN) 600 MG tablet Take 1 tablet by mouth 2 times daily as needed for Pain Take with food 360 tablet 1    gabapentin (NEURONTIN) 800 MG tablet Take 1 tablet by mouth 2 times daily for 60 days.  60 tablet 1    sildenafil (VIAGRA) 50 MG tablet Take 1 tablet by mouth daily as needed for Erectile Dysfunction 30 tablet 0    clobetasol propionate 0.05 % LOTN lotion apply to affected area twice a day 118 mL 3    QUEtiapine (SEROQUEL) 100 MG tablet Take 1 tablet by mouth nightly 14 tablet 0    lisinopril (PRINIVIL;ZESTRIL) 40 MG tablet Take 1 tablet by mouth daily 90 tablet 1    hydroCHLOROthiazide (HYDRODIURIL) 50 MG tablet Take 1 tablet by mouth daily 90 tablet 1    atorvastatin (LIPITOR) 10 MG tablet Take 1 tablet by mouth daily 90 tablet 1    buprenorphine-naloxone (SUBOXONE) 8-2 MG FILM SL film dissolve 2 FILMS under the tongue once daily      Elastic Bandages & Supports (NEOPRENE KNEE BRACE) MISC Apply to right knee daily 1 each 0    aspirin 81 MG EC tablet Take 81 mg by mouth daily (Patient not taking: Reported on 7/11/2022)       No current facility-administered medications for this visit. All medications has been reviewed with patient. Patient is taking the medications as prescribed and describes no side effects. Return to Office: Return if symptoms worsen or fail to improve. Patient has appointment scheduled for 08/01/22    This document may have been prepared at least partially through the use of voice recognition software. Although effort is taken to assure the accuracy of this document, it is possible that grammatical, syntax,  or spelling errors may occur.     Pricila Gómez MD   Family Medicine Resident Physician PGY-2

## 2022-07-12 RX ORDER — GABAPENTIN 800 MG/1
TABLET ORAL
Qty: 60 TABLET | Refills: 1 | OUTPATIENT
Start: 2022-07-12

## 2022-09-04 DIAGNOSIS — S22.069D CLOSED FRACTURE OF EIGHTH THORACIC VERTEBRA WITH ROUTINE HEALING, UNSPECIFIED FRACTURE MORPHOLOGY, SUBSEQUENT ENCOUNTER: ICD-10-CM

## 2022-09-06 RX ORDER — GABAPENTIN 800 MG/1
TABLET ORAL
Qty: 60 TABLET | Refills: 1 | OUTPATIENT
Start: 2022-09-06

## 2022-09-08 ENCOUNTER — OFFICE VISIT (OUTPATIENT)
Dept: FAMILY MEDICINE CLINIC | Age: 47
End: 2022-09-08
Payer: COMMERCIAL

## 2022-09-08 VITALS
HEIGHT: 69 IN | RESPIRATION RATE: 16 BRPM | DIASTOLIC BLOOD PRESSURE: 86 MMHG | TEMPERATURE: 97.5 F | HEART RATE: 91 BPM | OXYGEN SATURATION: 98 % | SYSTOLIC BLOOD PRESSURE: 126 MMHG | WEIGHT: 252 LBS | BODY MASS INDEX: 37.33 KG/M2

## 2022-09-08 DIAGNOSIS — Z13.220 LIPID SCREENING: ICD-10-CM

## 2022-09-08 DIAGNOSIS — Z13.1 DIABETES MELLITUS SCREENING: ICD-10-CM

## 2022-09-08 DIAGNOSIS — E66.9 CLASS 2 OBESITY WITHOUT SERIOUS COMORBIDITY WITH BODY MASS INDEX (BMI) OF 37.0 TO 37.9 IN ADULT, UNSPECIFIED OBESITY TYPE: ICD-10-CM

## 2022-09-08 DIAGNOSIS — S22.069D CLOSED FRACTURE OF EIGHTH THORACIC VERTEBRA WITH ROUTINE HEALING, UNSPECIFIED FRACTURE MORPHOLOGY, SUBSEQUENT ENCOUNTER: ICD-10-CM

## 2022-09-08 DIAGNOSIS — L40.9 PSORIASIS: Primary | ICD-10-CM

## 2022-09-08 PROCEDURE — 1036F TOBACCO NON-USER: CPT

## 2022-09-08 PROCEDURE — G8427 DOCREV CUR MEDS BY ELIG CLIN: HCPCS

## 2022-09-08 PROCEDURE — G8417 CALC BMI ABV UP PARAM F/U: HCPCS

## 2022-09-08 PROCEDURE — 99214 OFFICE O/P EST MOD 30 MIN: CPT

## 2022-09-08 RX ORDER — GABAPENTIN 800 MG/1
800 TABLET ORAL 3 TIMES DAILY
Qty: 90 TABLET | Refills: 1 | Status: SHIPPED
Start: 2022-09-08 | End: 2022-11-03 | Stop reason: SDUPTHER

## 2022-09-08 NOTE — PROGRESS NOTES
[facility]  Department of Family Medicine  Family Medicine Residency Program      Patient: Aric Thomas  1975 49 y.o. male     Date of Service: 9/8/2022      Chief complaint:   Chief Complaint   Patient presents with    Establish Care     NTP    Back Pain    Hypertension    Psoriasis     Requesting referral Dr. Vadim Bingham is a 49 y.o. male presented to the clinic to establish with me. back pain. Patient has been working overtime , can't take the muscle relaxors     Pain: back pain   Location lumbar and upper back   Intensity 9/10   Quality stabbing - pinching and burning   Onset - early July but has chronic back pains. Progression sometimes getting worse  Radiation - right buttocks   Alleviating - gabapentin takes TID when really bad, ibuprofen once in a while   Aggrevating picking up objects   Associated symptoms - no changes in with bowl movements, loss of control of urine, no change in b/l leg sensation.    Patient known - to have a closed fracture of eighth thoracic vertebra     Pain management appointment May 2 and then consider neurosurgery if warranted     Patient received a toradol injection 7/11/22   Patient wasn't able to come back for follow up in 2 weeks     Patient has a history of OxyContin use followed with suboxone now         Past Medical History:      Diagnosis Date    Chronic back pain 8/2/2011    Eczema     Essential hypertension      Past Surgical History:        Procedure Laterality Date    ANKLE SURGERY  2009    ORIF on Rt ankle     Allergies:    Morphine and Penicillins  Family History:       Problem Relation Age of Onset    High Blood Pressure Mother     Diabetes Father     Heart Disease Father     High Blood Pressure Father     COPD Father     Diabetes Brother     Diabetes Paternal Grandmother     Cancer Paternal Grandmother         Unknown    Prostate Cancer Paternal Grandfather      Review of Systems:   Review of Systems   Constitutional:  Negative for activity change, appetite change, fatigue and fever. HENT:  Negative for rhinorrhea, sinus pressure, sinus pain and sore throat. Eyes:  Negative for pain, discharge and visual disturbance. Respiratory:  Negative for chest tightness and shortness of breath. Cardiovascular:  Negative for chest pain, palpitations and leg swelling. Gastrointestinal:  Negative for abdominal distention, abdominal pain, constipation, diarrhea, nausea and vomiting. Genitourinary:  Negative for difficulty urinating and dysuria. Musculoskeletal:  Positive for back pain. Negative for neck pain. Skin:  Negative for rash and wound. Psoriasis     Neurological:  Negative for light-headedness, numbness and headaches. Psychiatric/Behavioral:  Negative for sleep disturbance. PHYSICAL EXAM   Vitals: /86   Pulse 91   Temp 97.5 °F (36.4 °C) (Temporal)   Resp 16   Ht 5' 9\" (1.753 m)   Wt 252 lb (114.3 kg)   SpO2 98%   BMI 37.21 kg/m²   Physical Exam  Vitals and nursing note reviewed. Constitutional:       General: He is awake. Appearance: Normal appearance. HENT:      Head: Normocephalic and atraumatic. Nose: Nose normal. No congestion or rhinorrhea. Mouth/Throat:      Mouth: Mucous membranes are moist.      Pharynx: Oropharynx is clear. Eyes:      General: No scleral icterus. Extraocular Movements: Extraocular movements intact. Conjunctiva/sclera: Conjunctivae normal.   Cardiovascular:      Rate and Rhythm: Normal rate and regular rhythm. Pulses: Normal pulses. Heart sounds: Normal heart sounds. No murmur heard. Pulmonary:      Effort: Pulmonary effort is normal. No respiratory distress. Breath sounds: Normal breath sounds. No stridor. No wheezing. Abdominal:      General: Bowel sounds are normal. There is no distension. Palpations: Abdomen is soft. There is no mass. Tenderness:  There is no abdominal tenderness. There is no guarding or rebound. Musculoskeletal:         General: Tenderness (middle and lower back) present. Cervical back: Normal range of motion and neck supple. No tenderness. Right lower leg: No edema. Left lower leg: No edema. Skin:     Comments: Psoriatic lesions on arms, back, hands   Neurological:      General: No focal deficit present. Mental Status: He is alert and oriented to person, place, and time. Psychiatric:         Attention and Perception: Attention normal.         Mood and Affect: Mood normal.         Behavior: Behavior is cooperative. Thought Content: Thought content normal.       Lab Results   Component Value Date    LABA1C 5.3 09/17/2021     No results found for: EAG     BP Readings from Last 3 Encounters:   09/08/22 126/86   07/11/22 138/86   05/23/22 (!) 160/99        ASSESSMENT AND PLAN     1. Closed fracture of eighth thoracic vertebra with routine healing, unspecified fracture morphology, subsequent encounter  Continue to monitor, encouraged to keep follow-up appointments with specialist.   - gabapentin (NEURONTIN) 800 MG tablet; Take 1 tablet by mouth 3 times daily for 60 days. Dispense: 90 tablet; Refill: 1  - External Referral To Pain Clinic  - Comprehensive Metabolic Panel  - [facility] Austin Love MD, Orthopaedics, [id number] [facility]    2. Psoriasis  -continue to monitor   -continue with current medications  - Kan Godwin MD, Dermatology, [place] ([facility])    3. Class 2 obesity without serious comorbidity with body mass index (BMI) of 37.0 to 37.9 in adult, unspecified obesity type  Encouraged to increase physical activity as able, decrease sweets, carbs. Mindful eating, decreasing portion sizes. - Comprehensive Metabolic Panel  - TSH; Future  - Lipid Panel; Future  - CBC; Future    4. Diabetes mellitus screening  - Hemoglobin A1C; Future    5. Lipid screening  - Lipid Panel;  Future    Counseled regarding above diagnosis, including possible risks and complications, especially if left uncontrolled. Counseled regarding the possible side effects, risks, benefits and alternatives to treatment; patient and/or guardian verbalizes understanding, agrees, feels comfortable with and wishes to proceed with above treatment plan. Call or go to ED immediately if symptoms worsen or persist. Advised patient to call with any new medication issues, and, as applicable, read all Rx info from pharmacy to assure aware of all possible risks and side effects of medication before taking. Patient and/or guardian given opportunity to ask questions/raise concerns. The patient verbalized comfort and understanding of instructions. I encourage further reading and education about your health conditions. Information on many health conditions is provided by the American Academy of Family Physicians: https://familydoctor. org/  Please bring any questions to me at your next visit. Medication List:    Current Outpatient Medications   Medication Sig Dispense Refill    clobetasol (OLUX) 0.05 % foam Apply topically 2 times daily. 100 g 3    ibuprofen (ADVIL;MOTRIN) 600 MG tablet Take 1 tablet by mouth 2 times daily as needed for Pain Take with food 360 tablet 1    gabapentin (NEURONTIN) 800 MG tablet Take 1 tablet by mouth 2 times daily for 60 days.  60 tablet 1    sildenafil (VIAGRA) 50 MG tablet Take 1 tablet by mouth daily as needed for Erectile Dysfunction 30 tablet 0    clobetasol propionate 0.05 % LOTN lotion apply to affected area twice a day 118 mL 3    QUEtiapine (SEROQUEL) 100 MG tablet Take 1 tablet by mouth nightly 14 tablet 0    lisinopril (PRINIVIL;ZESTRIL) 40 MG tablet Take 1 tablet by mouth daily 90 tablet 1    hydroCHLOROthiazide (HYDRODIURIL) 50 MG tablet Take 1 tablet by mouth daily 90 tablet 1    atorvastatin (LIPITOR) 10 MG tablet Take 1 tablet by mouth daily 90 tablet 1    buprenorphine-naloxone (SUBOXONE) 8-2 MG FILM SL film dissolve 2 FILMS under the tongue once daily      aspirin 81 MG EC tablet Take 81 mg by mouth daily      Elastic Bandages & Supports (NEOPRENE KNEE BRACE) MISC Apply to right knee daily 1 each 0     No current facility-administered medications for this visit. All medications has been reviewed with patient. Patient is taking the medications as prescribed and describes no side effects. Return to Office: No follow-ups on file. This document may have been prepared at least partially through the use of voice recognition software. Although effort is taken to assure the accuracy of this document, it is possible that grammatical, syntax,  or spelling errors may occur.     Luis Marquez MD   Family Medicine Resident Physician PGY-2

## 2022-09-08 NOTE — PROGRESS NOTES
S: 49 y.o. male with   Chief Complaint   Patient presents with    Establish Care     NTP    Back Pain    Hypertension    Psoriasis     Requesting referral Dr. Halle Romero        Chronic back pain - significant disease in back. He is currently managed on suboxone + medical [facility]. He was referred to pain management. O: VS:  height is 5' 9\" (1.753 m) and weight is 252 lb (114.3 kg). His temporal temperature is 97.5 °F (36.4 °C). His blood pressure is 126/86 and his pulse is 91. His respiration is 16 and oxygen saturation is 98%. BP Readings from Last 3 Encounters:   09/08/22 126/86   07/11/22 138/86   05/23/22 (!) 160/99     See resident note      Impression/Plan:   1) Chronic Back Pain -ok to increase gabapentin but will refer back to pain clinic. 2) Refer to Derm for psoriasis        Health Maintenance Due   Topic Date Due    DTaP/Tdap/Td vaccine (1 - Tdap) Never done    Colorectal Cancer Screen  Never done    Flu vaccine (1) 09/01/2022    Lipids  09/17/2022         Attending Physician Statement  I have discussed the case, including pertinent history and exam findings with the resident. I agree with the documented assessment and plan.       Nawaf Schneider MD

## 2022-09-12 ASSESSMENT — ENCOUNTER SYMPTOMS
ROS SKIN COMMENTS: PSORIASIS
EYE DISCHARGE: 0
BACK PAIN: 1
CONSTIPATION: 0
ABDOMINAL DISTENTION: 0
SORE THROAT: 0
SHORTNESS OF BREATH: 0
VOMITING: 0
ABDOMINAL PAIN: 0
NAUSEA: 0
SINUS PRESSURE: 0
SINUS PAIN: 0
CHEST TIGHTNESS: 0
EYE PAIN: 0
RHINORRHEA: 0
DIARRHEA: 0

## 2022-09-14 DIAGNOSIS — S22.069D CLOSED FRACTURE OF EIGHTH THORACIC VERTEBRA WITH ROUTINE HEALING, UNSPECIFIED FRACTURE MORPHOLOGY, SUBSEQUENT ENCOUNTER: Primary | ICD-10-CM

## 2022-10-12 DIAGNOSIS — I10 ESSENTIAL HYPERTENSION: ICD-10-CM

## 2022-10-12 DIAGNOSIS — Z13.1 DIABETES MELLITUS SCREENING: Primary | ICD-10-CM

## 2022-10-12 DIAGNOSIS — I25.2 HISTORY OF MI (MYOCARDIAL INFARCTION): ICD-10-CM

## 2022-10-12 DIAGNOSIS — Z13.220 LIPID SCREENING: ICD-10-CM

## 2022-10-12 DIAGNOSIS — S22.069D CLOSED FRACTURE OF EIGHTH THORACIC VERTEBRA WITH ROUTINE HEALING, UNSPECIFIED FRACTURE MORPHOLOGY, SUBSEQUENT ENCOUNTER: ICD-10-CM

## 2022-10-12 DIAGNOSIS — L30.9 ECZEMA, UNSPECIFIED TYPE: ICD-10-CM

## 2022-10-12 RX ORDER — HYDROCHLOROTHIAZIDE 50 MG/1
50 TABLET ORAL DAILY
Qty: 90 TABLET | Refills: 1 | Status: SHIPPED | OUTPATIENT
Start: 2022-10-12

## 2022-10-12 RX ORDER — CLOBETASOL PROPIONATE 0.5 MG/G
AEROSOL, FOAM TOPICAL
Qty: 100 G | Refills: 3 | Status: SHIPPED | OUTPATIENT
Start: 2022-10-12

## 2022-10-12 RX ORDER — ATORVASTATIN CALCIUM 10 MG/1
10 TABLET, FILM COATED ORAL DAILY
Qty: 90 TABLET | Refills: 1 | Status: SHIPPED | OUTPATIENT
Start: 2022-10-12

## 2022-10-12 RX ORDER — CLOBETASOL PROPIONATE 0.5 MG/ML
LOTION TOPICAL
Qty: 118 ML | Refills: 3 | Status: SHIPPED | OUTPATIENT
Start: 2022-10-12

## 2022-10-12 RX ORDER — LISINOPRIL 40 MG/1
40 TABLET ORAL DAILY
Qty: 90 TABLET | Refills: 1 | Status: SHIPPED | OUTPATIENT
Start: 2022-10-12

## 2022-10-12 NOTE — PROGRESS NOTES
Patient has known hypertension:    Medication fill requested however patient requires labs to be drawn and collected. Patient has previous labs which were ordered and has  new lab orders have been placed. Patient will be called and notified to get labs done    1. Diabetes mellitus screening  - Hemoglobin A1C; Future    2. Lipid screening  - Lipid Panel; Future    3. Essential hypertension  - Hemoglobin A1C; Future  - CBC; Future  - Comprehensive Metabolic Panel    Last two orders for back ortho referral have been declined for alternative reasons. History of significant back pain, fractures.      4. Closed fracture of eighth thoracic vertebra with routine healing, unspecified fracture morphology, subsequent encounter  - Sadie Grubbs MD, Neurosurgery, Li Corey MD

## 2022-11-02 DIAGNOSIS — S22.069D CLOSED FRACTURE OF EIGHTH THORACIC VERTEBRA WITH ROUTINE HEALING, UNSPECIFIED FRACTURE MORPHOLOGY, SUBSEQUENT ENCOUNTER: ICD-10-CM

## 2022-11-03 RX ORDER — GABAPENTIN 800 MG/1
800 TABLET ORAL 3 TIMES DAILY
Qty: 90 TABLET | Refills: 1 | Status: SHIPPED | OUTPATIENT
Start: 2022-11-03 | End: 2023-01-02

## 2022-11-03 RX ORDER — GABAPENTIN 800 MG/1
TABLET ORAL
Qty: 90 TABLET | Refills: 1 | OUTPATIENT
Start: 2022-11-03

## 2022-11-03 NOTE — TELEPHONE ENCOUNTER
Patient has external referral to pain management, moving forward they will be responsible for his pain medications. Medications refilled today with no refills.

## 2022-12-20 ENCOUNTER — OFFICE VISIT (OUTPATIENT)
Dept: FAMILY MEDICINE CLINIC | Age: 47
End: 2022-12-20

## 2022-12-20 VITALS
BODY MASS INDEX: 34.96 KG/M2 | RESPIRATION RATE: 16 BRPM | SYSTOLIC BLOOD PRESSURE: 139 MMHG | HEIGHT: 69 IN | OXYGEN SATURATION: 97 % | WEIGHT: 236 LBS | DIASTOLIC BLOOD PRESSURE: 84 MMHG | HEART RATE: 82 BPM | TEMPERATURE: 98.4 F

## 2022-12-20 DIAGNOSIS — Z76.0 MEDICATION REFILL: ICD-10-CM

## 2022-12-20 DIAGNOSIS — L73.9 FOLLICULITIS: Primary | ICD-10-CM

## 2022-12-20 DIAGNOSIS — Z23 FLU VACCINE NEED: ICD-10-CM

## 2022-12-20 RX ORDER — GABAPENTIN 800 MG/1
800 TABLET ORAL 3 TIMES DAILY
Qty: 90 TABLET | Refills: 1 | Status: SHIPPED | OUTPATIENT
Start: 2022-12-20 | End: 2023-02-18

## 2022-12-20 RX ORDER — CLOBETASOL PROPIONATE 0.5 MG/G
AEROSOL, FOAM TOPICAL
Qty: 100 G | Refills: 3 | Status: SHIPPED | OUTPATIENT
Start: 2022-12-20

## 2022-12-20 RX ORDER — SILDENAFIL 50 MG/1
50 TABLET, FILM COATED ORAL DAILY PRN
Qty: 30 TABLET | Refills: 0 | Status: SHIPPED | OUTPATIENT
Start: 2022-12-20

## 2022-12-20 RX ORDER — CLOBETASOL PROPIONATE 0.5 MG/ML
LOTION TOPICAL
Qty: 118 ML | Refills: 3 | Status: SHIPPED | OUTPATIENT
Start: 2022-12-20

## 2022-12-20 NOTE — PROGRESS NOTES
pain, palpitations and leg swelling. Gastrointestinal:  Negative for abdominal distention, abdominal pain, constipation, diarrhea, nausea and vomiting. Genitourinary:  Negative for difficulty urinating and dysuria. Musculoskeletal:  Negative for back pain and neck pain. Skin:  Negative for rash and wound. Neurological:  Negative for light-headedness, numbness and headaches. Psychiatric/Behavioral:  Negative for sleep disturbance. PHYSICAL EXAM   Vitals: /84   Pulse 82   Temp 98.4 °F (36.9 °C) (Temporal)   Resp 16   Ht 5' 9\" (1.753 m)   Wt 236 lb (107 kg)   SpO2 97%   BMI 34.85 kg/m²   Physical Exam  Vitals and nursing note reviewed. Constitutional:       General: He is awake. Appearance: Normal appearance. HENT:      Head: Normocephalic and atraumatic. Nose: Nose normal. No congestion or rhinorrhea. Mouth/Throat:      Mouth: Mucous membranes are moist.      Pharynx: Oropharynx is clear. Eyes:      General: Lids are everted, no foreign bodies appreciated. No scleral icterus. Right eye: No discharge. Left eye: No discharge. Extraocular Movements: Extraocular movements intact. Conjunctiva/sclera: Conjunctivae normal.      Pupils: Pupils are equal, round, and reactive to light. Comments: No changes in vision, no injected or discharge appreciated  Left eyelid has redness, mildly swollen by the hair follicles and is tender to touch. Cardiovascular:      Rate and Rhythm: Normal rate and regular rhythm. Pulses: Normal pulses. Heart sounds: Normal heart sounds. No murmur heard. Pulmonary:      Effort: Pulmonary effort is normal. No respiratory distress. Breath sounds: Normal breath sounds. No stridor. No wheezing. Abdominal:      General: Bowel sounds are normal. There is no distension. Palpations: Abdomen is soft. There is no mass. Tenderness: There is no abdominal tenderness. There is no guarding or rebound. Musculoskeletal:         General: Normal range of motion. Cervical back: Normal range of motion and neck supple. No tenderness. Right lower leg: No edema. Left lower leg: No edema. Neurological:      General: No focal deficit present. Mental Status: He is alert and oriented to person, place, and time. Psychiatric:         Attention and Perception: Attention normal.         Mood and Affect: Mood normal.         Behavior: Behavior normal. Behavior is cooperative. Thought Content: Thought content normal.         Judgment: Judgment normal.       Lab Results   Component Value Date    LABA1C 5.3 2021     No results found for: EAG     BP Readings from Last 3 Encounters:   22 139/84   22 126/86   22 138/86        ASSESSMENT AND PLAN   Gabo York was seen today for the followin. Folliculitis  -Put a warm, moist compress on your eye for 5 to 10 minutes, 3 to 6 times a day. Heat often brings a stye to a point where it drains on its own. Keep in mind that warm compresses will often increase swelling a little at first.  -Do not use hot water or heat a wet cloth in a microwave oven. The compress may get too hot and can burn the eyelid. - ciprofloxacin HCl (CILOXAN) 0.3 % ophthalmic ointment; Place 0.5 inches into the left eye 3 times daily for 10 days 3 times daily. For 7 days, if improved can stop, if not complete the course for 10 days  Dispense: 3.5 g; Refill: 0    2. Medication refill  - gabapentin (NEURONTIN) 800 MG tablet; Take 1 tablet by mouth 3 times daily for 60 days. Dispense: 90 tablet; Refill: 1  - sildenafil (VIAGRA) 50 MG tablet; Take 1 tablet by mouth daily as needed for Erectile Dysfunction  Dispense: 30 tablet; Refill: 0  - clobetasol (OLUX) 0.05 % foam; Apply topically 2 times daily. Dispense: 100 g; Refill: 3  - clobetasol propionate 0.05 % LOTN lotion; apply to affected area twice a day  Dispense: 118 mL; Refill: 3    3.  Flu vaccine need  - Influenza, FLUCELVAX, (age 10 mo+), IM, Preservative Free, 0.5 mL        Patient encouraged to follow up with previous imaging order, pain management and neurosurgery, dermatology as previously referred for his ongoing back pain and porosis. Counseled regarding above diagnosis, including possible risks and complications, especially if left uncontrolled. Counseled regarding the possible side effects, risks, benefits and alternatives to treatment; patient and/or guardian verbalizes understanding, agrees, feels comfortable with and wishes to proceed with above treatment plan. Call or go to ED immediately if symptoms worsen or persist. Advised patient to call with any new medication issues, and, as applicable, read all Rx info from pharmacy to assure aware of all possible risks and side effects of medication before taking. Patient and/or guardian given opportunity to ask questions/raise concerns. The patient verbalized comfort and understanding of instructions. I encourage further reading and education about your health conditions. Information on many health conditions is provided by the American Academy of Family [employer]icians: https://familydoctor. org/  Please bring any questions to me at your next visit. Medication List:    Current Outpatient Medications   Medication Sig Dispense Refill    gabapentin (NEURONTIN) 800 MG tablet Take 1 tablet by mouth 3 times daily for 60 days. 90 tablet 1    sildenafil (VIAGRA) 50 MG tablet Take 1 tablet by mouth daily as needed for Erectile Dysfunction 30 tablet 0    clobetasol (OLUX) 0.05 % foam Apply topically 2 times daily. 100 g 3    clobetasol propionate 0.05 % LOTN lotion apply to affected area twice a day 118 mL 3    ciprofloxacin HCl (CILOXAN) 0.3 % ophthalmic ointment Place 0.5 inches into the left eye 3 times daily for 10 days 3 times daily.  For 7 days, if improved can stop, if not complete the course for 10 days 3.5 g 0    lisinopril (PRINIVIL;ZESTRIL) 40 MG tablet Take 1 tablet by mouth daily 90 tablet 1    hydroCHLOROthiazide (HYDRODIURIL) 50 MG tablet Take 1 tablet by mouth daily 90 tablet 1    atorvastatin (LIPITOR) 10 MG tablet Take 1 tablet by mouth daily 90 tablet 1    ibuprofen (ADVIL;MOTRIN) 600 MG tablet Take 1 tablet by mouth 2 times daily as needed for Pain Take with food 360 tablet 1    buprenorphine-naloxone (SUBOXONE) 8-2 MG FILM SL film dissolve 2 FILMS under the tongue once daily      aspirin 81 MG EC tablet Take 81 mg by mouth daily      QUEtiapine (SEROQUEL) 100 MG tablet Take 1 tablet by mouth nightly (Patient not taking: Reported on 2022) 14 tablet 0    Elastic Bandages & Supports (NEOPRENE KNEE BRACE) MISC Apply to right knee daily 1 each 0     No current facility-administered medications for this visit. Return to Office: Return in about 1 month (around 2023) for for overall health check and weight , previous  labs . This document may have been prepared at least partially through the use of voice recognition software. Although effort is taken to assure the accuracy of this document, it is possible that grammatical, syntax,  or spelling errors may occur.     Renzo Benitez MD   Family Medicine Resident Physician PGY-2

## 2022-12-20 NOTE — PROGRESS NOTES
[facility] [address]  Precepting Note    Subjective:  Swelling in left upper eyelid  No drainage  No fevers, chills    ROS otherwise negative     Past medical, surgical, family and social history were reviewed, non-contributory, and unchanged unless otherwise stated. Objective:    /84   Pulse 82   Temp 98.4 °F (36.9 °C) (Temporal)   Resp 16   Ht 5' 9\" (1.753 m)   Wt 236 lb (107 kg)   SpO2 97%   BMI 34.85 kg/m²     Exam is as noted by resident   Assessment/Plan:  Left upper lid stye  Antibiotic ointment  Warm compresses    F/u as instructed     Attending Physician Statement  I have reviewed the chart, including any radiology or labs. I have discussed the case, including pertinent history and exam findings with the resident. I agree with the assessment, plan and orders as documented by the resident. Please refer to the resident note for additional information.       Electronically signed by Jessica Oquendo MD on 12/20/2022 at 3:04 PM

## 2022-12-20 NOTE — PATIENT INSTRUCTIONS
Put a warm, moist compress on your eye for 5 to 10 minutes, 3 to 6 times a day. Heat often brings a stye to a point where it drains on its own. Keep in mind that warm compresses will often increase swelling a little at first.  Do not use hot water or heat a wet cloth in a microwave oven. The compress may get too hot and can burn the eyelid.

## 2022-12-21 PROBLEM — N52.9 ERECTILE DYSFUNCTION: Status: ACTIVE | Noted: 2022-12-21

## 2022-12-21 PROBLEM — S22.069D CLOSED FRACTURE OF EIGHTH THORACIC VERTEBRA WITH ROUTINE HEALING: Status: ACTIVE | Noted: 2022-12-21

## 2022-12-21 ASSESSMENT — ENCOUNTER SYMPTOMS
BACK PAIN: 0
SHORTNESS OF BREATH: 0
PHOTOPHOBIA: 0
EYE PAIN: 0
CONSTIPATION: 0
DIARRHEA: 0
EYE ITCHING: 0
EYE DISCHARGE: 0
RHINORRHEA: 0
CHEST TIGHTNESS: 0
ABDOMINAL DISTENTION: 0
SORE THROAT: 0
ABDOMINAL PAIN: 0
SINUS PRESSURE: 0
SINUS PAIN: 0
NAUSEA: 0
VOMITING: 0

## 2023-01-31 ENCOUNTER — TELEPHONE (OUTPATIENT)
Dept: FAMILY MEDICINE CLINIC | Age: 48
End: 2023-01-31

## 2023-01-31 ENCOUNTER — OFFICE VISIT (OUTPATIENT)
Dept: FAMILY MEDICINE CLINIC | Age: 48
End: 2023-01-31
Payer: COMMERCIAL

## 2023-01-31 VITALS
OXYGEN SATURATION: 97 % | TEMPERATURE: 97.5 F | SYSTOLIC BLOOD PRESSURE: 135 MMHG | BODY MASS INDEX: 33.92 KG/M2 | DIASTOLIC BLOOD PRESSURE: 78 MMHG | HEART RATE: 88 BPM | WEIGHT: 229 LBS | HEIGHT: 69 IN

## 2023-01-31 DIAGNOSIS — Z13.1 DIABETES MELLITUS SCREENING: ICD-10-CM

## 2023-01-31 DIAGNOSIS — Z76.0 MEDICATION REFILL: ICD-10-CM

## 2023-01-31 DIAGNOSIS — L40.9 PSORIASIS: Primary | ICD-10-CM

## 2023-01-31 DIAGNOSIS — E66.9 CLASS 2 OBESITY WITHOUT SERIOUS COMORBIDITY WITH BODY MASS INDEX (BMI) OF 37.0 TO 37.9 IN ADULT, UNSPECIFIED OBESITY TYPE: ICD-10-CM

## 2023-01-31 DIAGNOSIS — Z13.220 LIPID SCREENING: ICD-10-CM

## 2023-01-31 LAB
CHOLESTEROL, TOTAL: 112 MG/DL (ref 0–199)
HBA1C MFR BLD: 4.9 % (ref 4–5.6)
HCT VFR BLD CALC: 45.4 % (ref 37–54)
HDLC SERPL-MCNC: 49 MG/DL
HEMOGLOBIN: 15 G/DL (ref 12.5–16.5)
LDL CHOLESTEROL CALCULATED: 51 MG/DL (ref 0–99)
MCH RBC QN AUTO: 31 PG (ref 26–35)
MCHC RBC AUTO-ENTMCNC: 33 % (ref 32–34.5)
MCV RBC AUTO: 93.8 FL (ref 80–99.9)
PDW BLD-RTO: 13.2 FL (ref 11.5–15)
PLATELET # BLD: 350 E9/L (ref 130–450)
PMV BLD AUTO: 10.5 FL (ref 7–12)
RBC # BLD: 4.84 E12/L (ref 3.8–5.8)
TRIGL SERPL-MCNC: 60 MG/DL (ref 0–149)
VLDLC SERPL CALC-MCNC: 12 MG/DL
WBC # BLD: 12.6 E9/L (ref 4.5–11.5)

## 2023-01-31 PROCEDURE — 3078F DIAST BP <80 MM HG: CPT

## 2023-01-31 PROCEDURE — 1036F TOBACCO NON-USER: CPT

## 2023-01-31 PROCEDURE — G8417 CALC BMI ABV UP PARAM F/U: HCPCS

## 2023-01-31 PROCEDURE — 3074F SYST BP LT 130 MM HG: CPT

## 2023-01-31 PROCEDURE — G8427 DOCREV CUR MEDS BY ELIG CLIN: HCPCS

## 2023-01-31 PROCEDURE — G8482 FLU IMMUNIZE ORDER/ADMIN: HCPCS

## 2023-01-31 PROCEDURE — 99213 OFFICE O/P EST LOW 20 MIN: CPT

## 2023-01-31 RX ORDER — SILDENAFIL 50 MG/1
50 TABLET, FILM COATED ORAL DAILY PRN
Qty: 30 TABLET | Refills: 0 | Status: SHIPPED | OUTPATIENT
Start: 2023-01-31

## 2023-01-31 RX ORDER — GABAPENTIN 800 MG/1
800 TABLET ORAL 3 TIMES DAILY
Qty: 90 TABLET | Refills: 1 | Status: SHIPPED | OUTPATIENT
Start: 2023-01-31 | End: 2023-04-01

## 2023-01-31 RX ORDER — TAZAROTENE 0.05 MG/G
CREAM CUTANEOUS
Qty: 60 G | Refills: 0 | Status: SHIPPED | OUTPATIENT
Start: 2023-01-31

## 2023-01-31 ASSESSMENT — ENCOUNTER SYMPTOMS
ABDOMINAL DISTENTION: 0
SORE THROAT: 0
CHEST TIGHTNESS: 0
DIARRHEA: 0
ABDOMINAL PAIN: 0
SINUS PAIN: 0
VOMITING: 0
EYE DISCHARGE: 0
EYE PAIN: 0
SHORTNESS OF BREATH: 0
CONSTIPATION: 0
NAUSEA: 0
RHINORRHEA: 0
SINUS PRESSURE: 0
BACK PAIN: 0

## 2023-01-31 ASSESSMENT — PATIENT HEALTH QUESTIONNAIRE - PHQ9
1. LITTLE INTEREST OR PLEASURE IN DOING THINGS: 0
SUM OF ALL RESPONSES TO PHQ9 QUESTIONS 1 & 2: 1
SUM OF ALL RESPONSES TO PHQ QUESTIONS 1-9: 1
SUM OF ALL RESPONSES TO PHQ QUESTIONS 1-9: 1
2. FEELING DOWN, DEPRESSED OR HOPELESS: 1
SUM OF ALL RESPONSES TO PHQ QUESTIONS 1-9: 1
SUM OF ALL RESPONSES TO PHQ QUESTIONS 1-9: 1

## 2023-01-31 NOTE — PROGRESS NOTES
[facility]  Department of Family Medicine  Family Medicine Residency Program      Patient: Nola Haynes  1975 49 y.o. male     Date of Service: 1/31/2023      Chief complaint:   Chief Complaint   Patient presents with    Hypertension    Back Pain    Eczema     Trouble finding a provider covered by insurance    Congestion     & dry cough        HISTORY OF PRESENTING ILLNESS     Nola Haynes is a 49 y.o. male presented to the clinic with complaints of  worsening psoriasis    Worsening Psoriasis   Patient attempted to call previous referral but doesn't take care source. Has been using Clobetasol foam and lotion   Does not use daily lotions or emollients   He states he has tried so many different treatments including grandmother tricks. states he has tried light treatment     Substance use disorder  Patient has a long-term close fracture of the eighth thoracic vertebrae  Prescribed suboxone for this:  Additcation outreach clinic   [address] [place] [address] Dr medical marijuana registry card from Dr. Lydia Jeffers 06/30/2023 till 05/27/2023   PDMP reviewed   Patient states that he is aware of the pain management referral but has not had a chance to call them back. HTN  Well controlled   Patient has been losing weight actively , working out more. Diet is varied. Has been increasing fruits and veggies. She has not been able to get labs done at this time, he does state that he recently lost a family member and a dog. States that he is coping well.     Past Medical History:      Diagnosis Date    Chronic back pain 8/2/2011    Eczema     Essential hypertension      Past Surgical History:        Procedure Laterality Date    ANKLE SURGERY  2009    ORIF on Rt ankle     Allergies:    Morphine and Penicillins  Family History:       Problem Relation Age of Onset    High Blood Pressure Mother     Diabetes Father     Heart Disease Father     High Blood Pressure Father     COPD Father     Heart Attack Sister     Diabetes Brother     Diabetes Paternal Grandmother     Cancer Paternal Grandmother         Unknown    Prostate Cancer Paternal Grandfather      Review of Systems:   Review of Systems   Constitutional:  Negative for activity change, appetite change, fatigue and fever. HENT:  Negative for rhinorrhea, sinus pressure, sinus pain and sore throat. Eyes:  Negative for pain, discharge and visual disturbance. Respiratory:  Negative for chest tightness and shortness of breath. Cardiovascular:  Negative for chest pain, palpitations and leg swelling. Gastrointestinal:  Negative for abdominal distention, abdominal pain, constipation, diarrhea, nausea and vomiting. Genitourinary:  Negative for difficulty urinating and dysuria. Musculoskeletal:  Negative for back pain and neck pain. Skin:  Positive for rash. Negative for wound. Neurological:  Negative for light-headedness, numbness and headaches. Psychiatric/Behavioral:  Negative for sleep disturbance. PHYSICAL EXAM   Vitals: /78   Pulse 88   Temp 97.5 °F (36.4 °C) (Temporal)   Ht 5' 9\" (1.753 m)   Wt 229 lb (103.9 kg)   SpO2 97%   BMI 33.82 kg/m²   Physical Exam  Vitals and nursing note reviewed. Constitutional:       General: He is awake. Appearance: Normal appearance. HENT:      Head: Normocephalic and atraumatic. Nose: Nose normal. No congestion or rhinorrhea. Mouth/Throat:      Mouth: Mucous membranes are moist.      Pharynx: Oropharynx is clear. Eyes:      General: No scleral icterus. Extraocular Movements: Extraocular movements intact. Conjunctiva/sclera: Conjunctivae normal.   Cardiovascular:      Rate and Rhythm: Normal rate and regular rhythm. Pulses: Normal pulses. Heart sounds: Normal heart sounds. No murmur heard. Pulmonary:      Effort: Pulmonary effort is normal. No respiratory distress. Breath sounds: Normal breath sounds. No stridor. No wheezing.    Abdominal: General: Bowel sounds are normal. There is no distension.      Palpations: Abdomen is soft. There is no mass.      Tenderness: There is no abdominal tenderness. There is no guarding or rebound.   Musculoskeletal:         General: Normal range of motion.      Cervical back: Normal range of motion and neck supple. No tenderness.      Right lower leg: No edema.      Left lower leg: No edema.   Skin:     Comments: Significant thick plaque psoriasis located on the elbow and below on the left arm, upper forearm on the right forearm.  And bilateral ankle areas.  Patient's skin looks good today does have some psoriasis on the forehead.   Neurological:      General: No focal deficit present.      Mental Status: He is alert and oriented to person, place, and time.   Psychiatric:         Attention and Perception: Attention normal.         Mood and Affect: Mood normal.         Behavior: Behavior is cooperative.       Lab Results   Component Value Date    LABA1C 5.3 2021     No results found for: EAG     BP Readings from Last 3 Encounters:   23 135/78   22 139/84   22 126/86        ASSESSMENT AND PLAN   Shahbaz Montgomery was seen today for the followin. Psoriasis  Patient given verbal and written instruction with regards to the psoriasis  Keep your skin moist. After bathing, put an ointment, cream, or lotion on your skin while it is still damp. This seals in moisture. Use over-the-counter products that your doctor suggests. These may include Cetaphil, Lubriderm, or Eucerin. Petroleum jelly (such as Vaseline) and vegetable shortening (such as Crisco) also work.  If you have psoriasis on your scalp, use a shampoo with salicylic acid, such as Neutrogena T/Sal.  Avoid harsh skin products, such as those that contain alcohol.  Try to prevent sunburn.   Take care to avoid accidents such as cutting or scraping your skin. An injury to the skin can cause psoriasis patches to form anywhere on the body,  including the area of the injury. - External Referral To Dermatology  - tazarotene (TAZORAC) 0.05 % cream; Apply topically nightly. Dispense: 60 g; Refill: 0    2. Diabetes mellitus screening  - Hemoglobin A1C; Future    3. Lipid screening  - LIPID PANEL; Future    4. Medication refill  - gabapentin (NEURONTIN) 800 MG tablet; Take 1 tablet by mouth 3 times daily for 60 days. Dispense: 90 tablet; Refill: 1  - sildenafil (VIAGRA) 50 MG tablet; Take 1 tablet by mouth daily as needed for Erectile Dysfunction  Dispense: 30 tablet; Refill: 0    5. Class 2 obesity without serious comorbidity with body mass index (BMI) of 37.0 to 37.9 in adult, unspecified obesity type  Lifestyle modification discussed  Patient encouraged to increase physical activity as able, increase fruits and vegetables, decrease sweets and carbs. Patient encouraged to make daily lifestyle adjustments including taking the stairs instead of the elevator, not shopping when hungry to buy healthy snacks, having smaller portions, eat sweets in moderation.   - CBC; Future  - Hemoglobin A1C; Future      Health Maintenance:   Encouraged and counseled Mariaelena Becker  about colorectal cancer screening at this time patient is not interested. Counseled regarding above diagnosis, including possible risks and complications, especially if left uncontrolled. Counseled regarding the possible side effects, risks, benefits and alternatives to treatment; patient and/or guardian verbalizes understanding, agrees, feels comfortable with and wishes to proceed with above treatment plan. Call or go to ED immediately if symptoms worsen or persist. Advised patient to call with any new medication issues, and, as applicable, read all Rx info from pharmacy to assure aware of all possible risks and side effects of medication before taking. Patient and/or guardian given opportunity to ask questions/raise concerns. The patient verbalized comfort and understanding of instructions.    I encourage further reading and education about your health conditions.Information on many health conditions is provided by the American Academy of Family Physicians: https://familydoctor.org/  Please bring any questions to me at your next visit.    Medication List:    Current Outpatient Medications   Medication Sig Dispense Refill    gabapentin (NEURONTIN) 800 MG tablet Take 1 tablet by mouth 3 times daily for 60 days. 90 tablet 1    sildenafil (VIAGRA) 50 MG tablet Take 1 tablet by mouth daily as needed for Erectile Dysfunction 30 tablet 0    tazarotene (TAZORAC) 0.05 % cream Apply topically nightly. 60 g 0    clobetasol (OLUX) 0.05 % foam Apply topically 2 times daily. 100 g 3    clobetasol propionate 0.05 % LOTN lotion apply to affected area twice a day 118 mL 3    lisinopril (PRINIVIL;ZESTRIL) 40 MG tablet Take 1 tablet by mouth daily 90 tablet 1    hydroCHLOROthiazide (HYDRODIURIL) 50 MG tablet Take 1 tablet by mouth daily 90 tablet 1    atorvastatin (LIPITOR) 10 MG tablet Take 1 tablet by mouth daily 90 tablet 1    ibuprofen (ADVIL;MOTRIN) 600 MG tablet Take 1 tablet by mouth 2 times daily as needed for Pain Take with food 360 tablet 1    buprenorphine-naloxone (SUBOXONE) 8-2 MG FILM SL film dissolve 2 FILMS under the tongue once daily      aspirin 81 MG EC tablet Take 81 mg by mouth daily      Elastic Bandages & Supports (NEOPRENE KNEE BRACE) MISC Apply to right knee daily 1 each 0     No current facility-administered medications for this visit.            Return to Office: Return in about 6 months (around 7/31/2023) for psorosis .    This document may have been prepared at least partially through the use of voice recognition software. Although effort is taken to assure the accuracy of this document, it is possible that grammatical, syntax,  or spelling errors may occur.    NIRU LU MD   Family Medicine Resident Physician PGY-2

## 2023-01-31 NOTE — PROGRESS NOTES
[facility] [address]  Precepting Note    Subjective:  Here for routine f/u  He has h/o psoriasis   Needs referral to a dermatologist  Has been using clobetasool foam/ lotion with incomplete results  Was referred previously but the physician did not accept his insurance by report. Has not been on biologics  He is also requesting refill of gabapentin  On suboxone chronically. Oarrs reviewed supports this. Also on medical marijuana. ROS otherwise as per resident note     Past medical, surgical, family and social history were reviewed, non-contributory, and unchanged unless otherwise stated. Objective:    /78   Pulse 88   Temp 97.5 °F (36.4 °C) (Temporal)   Ht 5' 9\" (1.753 m)   Wt 229 lb (103.9 kg)   SpO2 97%   BMI 33.82 kg/m²   Exam is as noted by resident     Assessment/Plan:  Psoriasis- add retinoid, continue high dose topical steroid and emollient; may benefit from biologics; refer to Dr Unger Stable  Low back pain - chronic, stable on gabapentin; previous referral to pain management; oarrs reviewed- note medical marijuana prescription in addition to suboxone; no signs of misuse  Substance use disorder- stable on suboxone; follows with       Attending Physician Statement  I have reviewed the chart, including any radiology or labs. I have discussed the case, including pertinent history and exam findings with the resident. I agree with the assessment, plan and orders as documented by the resident. Please refer to the resident note for additional information.       Electronically signed by Yamilka Dougherty MD on 1/31/2023 at 10:06 AM

## 2023-02-02 DIAGNOSIS — S22.069D CLOSED FRACTURE OF EIGHTH THORACIC VERTEBRA WITH ROUTINE HEALING, UNSPECIFIED FRACTURE MORPHOLOGY, SUBSEQUENT ENCOUNTER: Primary | ICD-10-CM

## 2023-02-02 NOTE — PROGRESS NOTES
Patient's previous pain management referral has closed and is requesting another one. Patient has history of a closed fracture of the 8 thoracic vertebrae. Continues to have some pain currently managed with gabapentin.     1. Closed fracture of eighth thoracic vertebra with routine healing, unspecified fracture morphology, subsequent encounter  - External Referral To Pain Clinic

## 2023-02-02 NOTE — RESULT ENCOUNTER NOTE
Please notify patient that their lab results are normal including lipid panel, hemoglobin A1C and CBC. Please congratulate patient that his weight loss, it is making a big difference. Continue to increase physical activity as able, increase fruits and vegetables, decrease sweets and carbs.

## 2023-03-26 DIAGNOSIS — Z76.0 MEDICATION REFILL: ICD-10-CM

## 2023-03-27 RX ORDER — CLOBETASOL PROPIONATE 0.5 MG/ML
LOTION TOPICAL
Qty: 118 ML | Refills: 3 | Status: SHIPPED | OUTPATIENT
Start: 2023-03-27

## 2023-03-27 RX ORDER — CLOBETASOL PROPIONATE 0.5 MG/G
AEROSOL, FOAM TOPICAL
Qty: 100 G | Refills: 3 | Status: SHIPPED | OUTPATIENT
Start: 2023-03-27

## 2023-03-27 NOTE — TELEPHONE ENCOUNTER
Last Appointment   1/31/2023  Next Appointment  Visit date not found     Return in about 6 months (around 7/31/2023) for psorosis

## 2023-05-05 DIAGNOSIS — Z76.0 MEDICATION REFILL: ICD-10-CM

## 2023-05-05 RX ORDER — SILDENAFIL 50 MG/1
50 TABLET, FILM COATED ORAL DAILY PRN
Qty: 30 TABLET | Refills: 0 | Status: SHIPPED | OUTPATIENT
Start: 2023-05-05

## 2023-05-05 RX ORDER — GABAPENTIN 800 MG/1
800 TABLET ORAL 3 TIMES DAILY
Qty: 90 TABLET | Refills: 1 | Status: SHIPPED | OUTPATIENT
Start: 2023-05-05 | End: 2023-07-04

## 2023-05-05 RX ORDER — CLOBETASOL PROPIONATE 0.5 MG/ML
LOTION TOPICAL
Qty: 118 ML | Refills: 3 | Status: SHIPPED | OUTPATIENT
Start: 2023-05-05

## 2023-05-05 NOTE — TELEPHONE ENCOUNTER
Refill needed on:    Viagra 50 mg    Gabapentin 800 mg    Clobetasol cream     [facility] on [address]    Last visit 1/31/23  No future visit scheduled

## 2023-06-27 DIAGNOSIS — Z76.0 MEDICATION REFILL: ICD-10-CM

## 2023-06-28 RX ORDER — GABAPENTIN 800 MG/1
800 TABLET ORAL 3 TIMES DAILY
Qty: 90 TABLET | Refills: 0 | Status: SHIPPED | OUTPATIENT
Start: 2023-06-28 | End: 2023-08-27

## 2023-07-08 ENCOUNTER — HOSPITAL ENCOUNTER (EMERGENCY)
Age: 48
Discharge: LEFT BEFORE DISCHARGE | End: 2023-07-09
Attending: STUDENT IN AN ORGANIZED HEALTH CARE EDUCATION/TRAINING PROGRAM
Payer: COMMERCIAL

## 2023-07-08 ENCOUNTER — APPOINTMENT (OUTPATIENT)
Dept: CT IMAGING | Age: 48
End: 2023-07-08
Payer: COMMERCIAL

## 2023-07-08 ENCOUNTER — APPOINTMENT (OUTPATIENT)
Dept: ULTRASOUND IMAGING | Age: 48
End: 2023-07-08
Payer: COMMERCIAL

## 2023-07-08 VITALS
OXYGEN SATURATION: 97 % | DIASTOLIC BLOOD PRESSURE: 104 MMHG | WEIGHT: 200 LBS | SYSTOLIC BLOOD PRESSURE: 161 MMHG | TEMPERATURE: 98.4 F | HEIGHT: 68 IN | BODY MASS INDEX: 30.31 KG/M2 | RESPIRATION RATE: 16 BRPM | HEART RATE: 95 BPM

## 2023-07-08 DIAGNOSIS — Z53.21 ELOPED FROM EMERGENCY DEPARTMENT: Primary | ICD-10-CM

## 2023-07-08 LAB
ALBUMIN SERPL-MCNC: 4.4 G/DL (ref 3.5–5.2)
ALP SERPL-CCNC: 87 U/L (ref 40–129)
ALT SERPL-CCNC: 17 U/L (ref 0–40)
ANION GAP SERPL CALCULATED.3IONS-SCNC: 10 MMOL/L (ref 7–16)
AST SERPL-CCNC: 22 U/L (ref 0–39)
BASOPHILS # BLD: 0 E9/L (ref 0–0.2)
BASOPHILS NFR BLD: 0 % (ref 0–2)
BILIRUB DIRECT SERPL-MCNC: <0.2 MG/DL (ref 0–0.3)
BILIRUB INDIRECT SERPL-MCNC: NORMAL MG/DL (ref 0–1)
BILIRUB SERPL-MCNC: 0.5 MG/DL (ref 0–1.2)
BUN SERPL-MCNC: 25 MG/DL (ref 6–20)
CALCIUM SERPL-MCNC: 10 MG/DL (ref 8.6–10.2)
CHLORIDE SERPL-SCNC: 102 MMOL/L (ref 98–107)
CO2 SERPL-SCNC: 28 MMOL/L (ref 22–29)
CREAT SERPL-MCNC: 1.4 MG/DL (ref 0.7–1.2)
EKG ATRIAL RATE: 79 BPM
EKG P AXIS: 57 DEGREES
EKG P-R INTERVAL: 120 MS
EKG Q-T INTERVAL: 362 MS
EKG QRS DURATION: 90 MS
EKG QTC CALCULATION (BAZETT): 415 MS
EKG R AXIS: 43 DEGREES
EKG T AXIS: 45 DEGREES
EKG VENTRICULAR RATE: 79 BPM
EOSINOPHIL # BLD: 0.14 E9/L (ref 0.05–0.5)
EOSINOPHIL NFR BLD: 0.9 % (ref 0–6)
ERYTHROCYTE [DISTWIDTH] IN BLOOD BY AUTOMATED COUNT: 13.1 FL (ref 11.5–15)
GLUCOSE SERPL-MCNC: 90 MG/DL (ref 74–99)
HCT VFR BLD AUTO: 48.5 % (ref 37–54)
HGB BLD-MCNC: 16.4 G/DL (ref 12.5–16.5)
LACTATE BLDV-SCNC: 1.4 MMOL/L (ref 0.5–2.2)
LIPASE: 93 U/L (ref 13–60)
LYMPHOCYTES # BLD: 1.06 E9/L (ref 1.5–4)
LYMPHOCYTES NFR BLD: 6.9 % (ref 20–42)
MCH RBC QN AUTO: 31.2 PG (ref 26–35)
MCHC RBC AUTO-ENTMCNC: 33.8 % (ref 32–34.5)
MCV RBC AUTO: 92.4 FL (ref 80–99.9)
MONOCYTES # BLD: 1.37 E9/L (ref 0.1–0.95)
MONOCYTES NFR BLD: 8.6 % (ref 2–12)
NEUTROPHILS # BLD: 12.77 E9/L (ref 1.8–7.3)
NEUTS SEG NFR BLD: 83.6 % (ref 43–80)
NRBC BLD-RTO: 0 /100 WBC
OVALOCYTES: ABNORMAL
PLATELET # BLD AUTO: 322 E9/L (ref 130–450)
PMV BLD AUTO: 9.6 FL (ref 7–12)
POIKILOCYTES: ABNORMAL
POTASSIUM SERPL-SCNC: 4 MMOL/L (ref 3.5–5)
PROT SERPL-MCNC: 7.5 G/DL (ref 6.4–8.3)
RBC # BLD AUTO: 5.25 E12/L (ref 3.8–5.8)
SODIUM SERPL-SCNC: 140 MMOL/L (ref 132–146)
TROPONIN, HIGH SENSITIVITY: 8 NG/L (ref 0–11)
WBC # BLD: 15.2 E9/L (ref 4.5–11.5)

## 2023-07-08 PROCEDURE — 83605 ASSAY OF LACTIC ACID: CPT

## 2023-07-08 PROCEDURE — 76705 ECHO EXAM OF ABDOMEN: CPT

## 2023-07-08 PROCEDURE — 85025 COMPLETE CBC W/AUTO DIFF WBC: CPT

## 2023-07-08 PROCEDURE — 74177 CT ABD & PELVIS W/CONTRAST: CPT

## 2023-07-08 PROCEDURE — 6360000004 HC RX CONTRAST MEDICATION: Performed by: RADIOLOGY

## 2023-07-08 PROCEDURE — 80076 HEPATIC FUNCTION PANEL: CPT

## 2023-07-08 PROCEDURE — 93005 ELECTROCARDIOGRAM TRACING: CPT

## 2023-07-08 PROCEDURE — 80048 BASIC METABOLIC PNL TOTAL CA: CPT

## 2023-07-08 PROCEDURE — 84484 ASSAY OF TROPONIN QUANT: CPT

## 2023-07-08 PROCEDURE — 99285 EMERGENCY DEPT VISIT HI MDM: CPT

## 2023-07-08 PROCEDURE — 2580000003 HC RX 258

## 2023-07-08 PROCEDURE — 83690 ASSAY OF LIPASE: CPT

## 2023-07-08 RX ORDER — 0.9 % SODIUM CHLORIDE 0.9 %
1000 INTRAVENOUS SOLUTION INTRAVENOUS ONCE
Status: COMPLETED | OUTPATIENT
Start: 2023-07-08 | End: 2023-07-08

## 2023-07-08 RX ADMIN — IOPAMIDOL 75 ML: 755 INJECTION, SOLUTION INTRAVENOUS at 19:50

## 2023-07-08 RX ADMIN — SODIUM CHLORIDE 1000 ML: 9 INJECTION, SOLUTION INTRAVENOUS at 17:22

## 2023-07-08 ASSESSMENT — PAIN DESCRIPTION - DESCRIPTORS: DESCRIPTORS: SHARP;STABBING

## 2023-07-08 ASSESSMENT — PAIN - FUNCTIONAL ASSESSMENT
PAIN_FUNCTIONAL_ASSESSMENT: 0-10
PAIN_FUNCTIONAL_ASSESSMENT: PREVENTS OR INTERFERES SOME ACTIVE ACTIVITIES AND ADLS

## 2023-07-08 ASSESSMENT — LIFESTYLE VARIABLES
HOW OFTEN DO YOU HAVE A DRINK CONTAINING ALCOHOL: MONTHLY OR LESS
HOW MANY STANDARD DRINKS CONTAINING ALCOHOL DO YOU HAVE ON A TYPICAL DAY: 1 OR 2

## 2023-07-08 ASSESSMENT — PAIN DESCRIPTION - ORIENTATION: ORIENTATION: RIGHT;UPPER

## 2023-07-08 ASSESSMENT — PAIN DESCRIPTION - PAIN TYPE: TYPE: ACUTE PAIN

## 2023-07-08 ASSESSMENT — PAIN DESCRIPTION - FREQUENCY: FREQUENCY: CONTINUOUS

## 2023-07-08 ASSESSMENT — PAIN DESCRIPTION - LOCATION: LOCATION: ABDOMEN

## 2023-07-08 ASSESSMENT — PAIN DESCRIPTION - ONSET: ONSET: SUDDEN

## 2023-07-08 NOTE — ED PROVIDER NOTES
[facility]        Pt Name: Jany Muñiz  MRN: [id number]  [address] [facility] [place] 1975  Date of evaluation: 7/8/2023  Provider: Mindy Marcano MD  PCP: Edgard Galvez MD  Note Started: 4:32 PM EDT 7/8/23    CHIEF COMPLAINT       Chief Complaint   Patient presents with    Abdominal Pain     RUQ pain, still has gallbladder, pt states he \"partied pretty hard\" on 7/4 but had this pain prior    Nausea    Other     Dark urine       HISTORY OF PRESENT ILLNESS: 1 or more Elements   History From: Patient    Limitations to history : None  Social Determinants : None    Jany Muñiz is a 51 y.o. male who presents with upper quadrant abdominal pain since 4 days which has exacerbated since this morning associated with multiple episodes of vomiting containing food particles. No history of similar pain in the past.  He did noticed darkening of his urine since a couple of days. Denies any fever, chills, n/v, headache, dizziness, vision changes, neck tenderness or stiffness, weakness, cp, palpitations, leg swelling/tenderness, sob, cough, dysuria, hematuria, diarrhea, constipation, bloody stools. No history of past abdominal surgeries. Is not on any blood thinners. Takes Suboxone for pain. Nursing Notes were all reviewed and agreed with or any disagreements were addressed in the HPI.    ROS:   Pertinent positives and negatives are stated within HPI, all other systems reviewed and are negative.  --------------------------------------------- PAST HISTORY ---------------------------------------------  Past Medical History:  has a past medical history of Chronic back pain, Eczema, and Essential hypertension. Past Surgical History:  has a past surgical history that includes Ankle surgery (2009). Social History:  reports that he quit smoking about 12 years ago. His smoking use included cigarettes.  He has a 14.00 pack-year

## 2023-07-10 LAB
EKG ATRIAL RATE: 79 BPM
EKG P AXIS: 57 DEGREES
EKG P-R INTERVAL: 120 MS
EKG Q-T INTERVAL: 362 MS
EKG QRS DURATION: 90 MS
EKG QTC CALCULATION (BAZETT): 415 MS
EKG R AXIS: 43 DEGREES
EKG T AXIS: 45 DEGREES
EKG VENTRICULAR RATE: 79 BPM

## 2023-07-10 PROCEDURE — 93010 ELECTROCARDIOGRAM REPORT: CPT | Performed by: INTERNAL MEDICINE

## 2023-07-13 ENCOUNTER — OFFICE VISIT (OUTPATIENT)
Dept: FAMILY MEDICINE CLINIC | Age: 48
End: 2023-07-13
Payer: COMMERCIAL

## 2023-07-13 VITALS
HEIGHT: 69 IN | OXYGEN SATURATION: 98 % | BODY MASS INDEX: 27.85 KG/M2 | WEIGHT: 188 LBS | HEART RATE: 78 BPM | RESPIRATION RATE: 16 BRPM | SYSTOLIC BLOOD PRESSURE: 146 MMHG | TEMPERATURE: 98.1 F | DIASTOLIC BLOOD PRESSURE: 102 MMHG

## 2023-07-13 DIAGNOSIS — R10.11 RIGHT UPPER QUADRANT ABDOMINAL PAIN: ICD-10-CM

## 2023-07-13 DIAGNOSIS — R10.11 RIGHT UPPER QUADRANT ABDOMINAL PAIN: Primary | ICD-10-CM

## 2023-07-13 DIAGNOSIS — N39.0 URINARY TRACT INFECTION WITHOUT HEMATURIA, SITE UNSPECIFIED: ICD-10-CM

## 2023-07-13 LAB
ALBUMIN SERPL-MCNC: 4.4 G/DL (ref 3.5–5.2)
ALP SERPL-CCNC: 75 U/L (ref 40–129)
ALT SERPL-CCNC: 13 U/L (ref 0–40)
ANION GAP SERPL CALCULATED.3IONS-SCNC: 8 MMOL/L (ref 7–16)
AST SERPL-CCNC: 17 U/L (ref 0–39)
BILIRUB SERPL-MCNC: 0.5 MG/DL (ref 0–1.2)
BILIRUBIN, POC: NORMAL
BLOOD URINE, POC: NORMAL
BUN SERPL-MCNC: 16 MG/DL (ref 6–20)
CALCIUM SERPL-MCNC: 9.9 MG/DL (ref 8.6–10.2)
CHLORIDE SERPL-SCNC: 103 MMOL/L (ref 98–107)
CLARITY, POC: CLEAR
CO2 SERPL-SCNC: 29 MMOL/L (ref 22–29)
COLOR, POC: NORMAL
CREAT SERPL-MCNC: 0.9 MG/DL (ref 0.7–1.2)
GLUCOSE SERPL-MCNC: 81 MG/DL (ref 74–99)
GLUCOSE URINE, POC: NORMAL
KETONES, POC: NORMAL
LEUKOCYTE EST, POC: NORMAL
NITRITE, POC: NORMAL
PH, POC: 5.5
POTASSIUM SERPL-SCNC: 4 MMOL/L (ref 3.5–5)
PROT SERPL-MCNC: 7.5 G/DL (ref 6.4–8.3)
PROTEIN, POC: NORMAL
SODIUM SERPL-SCNC: 140 MMOL/L (ref 132–146)
SPECIFIC GRAVITY, POC: 1.02
UROBILINOGEN, POC: 1

## 2023-07-13 PROCEDURE — G8427 DOCREV CUR MEDS BY ELIG CLIN: HCPCS

## 2023-07-13 PROCEDURE — 99213 OFFICE O/P EST LOW 20 MIN: CPT

## 2023-07-13 PROCEDURE — G8417 CALC BMI ABV UP PARAM F/U: HCPCS

## 2023-07-13 PROCEDURE — 1036F TOBACCO NON-USER: CPT

## 2023-07-13 PROCEDURE — 81002 URINALYSIS NONAUTO W/O SCOPE: CPT

## 2023-07-13 PROCEDURE — 3078F DIAST BP <80 MM HG: CPT

## 2023-07-13 PROCEDURE — 3074F SYST BP LT 130 MM HG: CPT

## 2023-07-13 RX ORDER — CEPHALEXIN 500 MG/1
500 CAPSULE ORAL 3 TIMES DAILY
Qty: 21 CAPSULE | Refills: 0 | Status: SHIPPED | OUTPATIENT
Start: 2023-07-13 | End: 2023-07-20

## 2023-07-14 ENCOUNTER — TELEPHONE (OUTPATIENT)
Dept: FAMILY MEDICINE CLINIC | Age: 48
End: 2023-07-14

## 2023-07-14 NOTE — TELEPHONE ENCOUNTER
Pt requesting US be changed to STAT because scheduling cannot get him scheduled sooner than 5-7 days from now and he is in too much pain to wait that long.

## 2023-07-15 LAB — BACTERIA UR CULT: NORMAL

## 2023-07-17 ENCOUNTER — HOSPITAL ENCOUNTER (OUTPATIENT)
Dept: NUCLEAR MEDICINE | Age: 48
Discharge: HOME | End: 2023-07-19
Payer: COMMERCIAL

## 2023-07-17 DIAGNOSIS — R10.11 RIGHT UPPER QUADRANT ABDOMINAL PAIN: ICD-10-CM

## 2023-07-17 PROCEDURE — A9537 TC99M MEBROFENIN: HCPCS | Performed by: RADIOLOGY

## 2023-07-17 PROCEDURE — 3430000000 HC RX DIAGNOSTIC RADIOPHARMACEUTICAL: Performed by: RADIOLOGY

## 2023-07-17 PROCEDURE — 78226 HEPATOBILIARY SYSTEM IMAGING: CPT

## 2023-07-17 RX ADMIN — Medication 5.8 MILLICURIE: at 08:32

## 2023-07-17 ASSESSMENT — ENCOUNTER SYMPTOMS
RHINORRHEA: 0
SORE THROAT: 0
ABDOMINAL PAIN: 1
EYE DISCHARGE: 0
SINUS PAIN: 0
CHEST TIGHTNESS: 0
BACK PAIN: 0
CONSTIPATION: 0
SINUS PRESSURE: 0
SHORTNESS OF BREATH: 0
DIARRHEA: 0
ABDOMINAL DISTENTION: 1
NAUSEA: 0
EYE PAIN: 0
VOMITING: 1

## 2023-07-17 NOTE — RESULT ENCOUNTER NOTE
Please let patient know his hida scan was negative no evidence of acute cholecystitis, his urine culture was negative, and his CMP is reassuring. Please continue to have a bland diet, increase as tolerated, increase hydration. Everything is reassuring.

## 2023-07-18 ENCOUNTER — TELEPHONE (OUTPATIENT)
Dept: FAMILY MEDICINE CLINIC | Age: 48
End: 2023-07-18

## 2023-07-24 DIAGNOSIS — Z76.0 MEDICATION REFILL: ICD-10-CM

## 2023-07-24 RX ORDER — CLOBETASOL PROPIONATE 0.5 MG/G
AEROSOL, FOAM TOPICAL
Qty: 100 G | Refills: 4 | Status: SHIPPED | OUTPATIENT
Start: 2023-07-24

## 2023-07-31 ENCOUNTER — OFFICE VISIT (OUTPATIENT)
Dept: FAMILY MEDICINE CLINIC | Age: 48
End: 2023-07-31

## 2023-07-31 VITALS
HEART RATE: 113 BPM | RESPIRATION RATE: 16 BRPM | SYSTOLIC BLOOD PRESSURE: 139 MMHG | HEIGHT: 69 IN | TEMPERATURE: 97.7 F | WEIGHT: 191 LBS | DIASTOLIC BLOOD PRESSURE: 95 MMHG | OXYGEN SATURATION: 98 % | BODY MASS INDEX: 28.29 KG/M2

## 2023-07-31 DIAGNOSIS — M54.50 ACUTE RIGHT-SIDED LOW BACK PAIN WITHOUT SCIATICA: Primary | ICD-10-CM

## 2023-07-31 DIAGNOSIS — Z76.0 MEDICATION REFILL: ICD-10-CM

## 2023-07-31 RX ORDER — GABAPENTIN 800 MG/1
800 TABLET ORAL 3 TIMES DAILY
Qty: 90 TABLET | Refills: 0 | Status: SHIPPED | OUTPATIENT
Start: 2023-07-31 | End: 2023-08-30

## 2023-07-31 RX ORDER — NAPROXEN 500 MG/1
500 TABLET ORAL 2 TIMES DAILY WITH MEALS
Qty: 14 TABLET | Refills: 0 | Status: SHIPPED | OUTPATIENT
Start: 2023-07-31 | End: 2023-08-07

## 2023-07-31 SDOH — ECONOMIC STABILITY: HOUSING INSECURITY
IN THE LAST 12 MONTHS, WAS THERE A TIME WHEN YOU DID NOT HAVE A STEADY PLACE TO SLEEP OR SLEPT IN A SHELTER (INCLUDING NOW)?: NO

## 2023-07-31 SDOH — ECONOMIC STABILITY: FOOD INSECURITY: WITHIN THE PAST 12 MONTHS, THE FOOD YOU BOUGHT JUST DIDN'T LAST AND YOU DIDN'T HAVE MONEY TO GET MORE.: NEVER TRUE

## 2023-07-31 SDOH — ECONOMIC STABILITY: FOOD INSECURITY: WITHIN THE PAST 12 MONTHS, YOU WORRIED THAT YOUR FOOD WOULD RUN OUT BEFORE YOU GOT MONEY TO BUY MORE.: NEVER TRUE

## 2023-07-31 SDOH — ECONOMIC STABILITY: INCOME INSECURITY: HOW HARD IS IT FOR YOU TO PAY FOR THE VERY BASICS LIKE FOOD, HOUSING, MEDICAL CARE, AND HEATING?: SOMEWHAT HARD

## 2023-07-31 ASSESSMENT — ENCOUNTER SYMPTOMS
VOMITING: 1
CONSTIPATION: 0
SHORTNESS OF BREATH: 0
COUGH: 0
ABDOMINAL PAIN: 0
RHINORRHEA: 0
NAUSEA: 0
DIARRHEA: 0
BACK PAIN: 1

## 2023-08-15 ENCOUNTER — OFFICE VISIT (OUTPATIENT)
Dept: FAMILY MEDICINE CLINIC | Age: 48
End: 2023-08-15
Payer: COMMERCIAL

## 2023-08-15 VITALS
SYSTOLIC BLOOD PRESSURE: 127 MMHG | HEART RATE: 84 BPM | BODY MASS INDEX: 29.09 KG/M2 | DIASTOLIC BLOOD PRESSURE: 77 MMHG | WEIGHT: 197 LBS

## 2023-08-15 DIAGNOSIS — G89.29 CHRONIC BILATERAL THORACIC BACK PAIN: Primary | ICD-10-CM

## 2023-08-15 DIAGNOSIS — L40.9 PSORIASIS: ICD-10-CM

## 2023-08-15 DIAGNOSIS — I10 ESSENTIAL HYPERTENSION: ICD-10-CM

## 2023-08-15 DIAGNOSIS — M54.6 CHRONIC BILATERAL THORACIC BACK PAIN: Primary | ICD-10-CM

## 2023-08-15 DIAGNOSIS — I25.2 HISTORY OF MI (MYOCARDIAL INFARCTION): ICD-10-CM

## 2023-08-15 DIAGNOSIS — Z76.0 MEDICATION REFILL: ICD-10-CM

## 2023-08-15 PROCEDURE — 3078F DIAST BP <80 MM HG: CPT

## 2023-08-15 PROCEDURE — 3074F SYST BP LT 130 MM HG: CPT

## 2023-08-15 PROCEDURE — G8417 CALC BMI ABV UP PARAM F/U: HCPCS

## 2023-08-15 PROCEDURE — 1036F TOBACCO NON-USER: CPT

## 2023-08-15 PROCEDURE — G8427 DOCREV CUR MEDS BY ELIG CLIN: HCPCS

## 2023-08-15 PROCEDURE — 99214 OFFICE O/P EST MOD 30 MIN: CPT

## 2023-08-15 RX ORDER — LISINOPRIL 40 MG/1
40 TABLET ORAL DAILY
Qty: 90 TABLET | Refills: 1 | Status: SHIPPED | OUTPATIENT
Start: 2023-08-15

## 2023-08-15 RX ORDER — CLOBETASOL PROPIONATE 0.5 MG/ML
LOTION TOPICAL
Qty: 118 ML | Refills: 3 | Status: SHIPPED | OUTPATIENT
Start: 2023-08-15

## 2023-08-15 RX ORDER — GABAPENTIN 800 MG/1
800 TABLET ORAL 3 TIMES DAILY
Qty: 270 TABLET | Refills: 0 | Status: SHIPPED | OUTPATIENT
Start: 2023-08-15 | End: 2023-11-13

## 2023-08-15 RX ORDER — HYDROCHLOROTHIAZIDE 50 MG/1
50 TABLET ORAL DAILY
Qty: 90 TABLET | Refills: 1 | Status: SHIPPED | OUTPATIENT
Start: 2023-08-15

## 2023-08-15 RX ORDER — CLOBETASOL PROPIONATE 0.5 MG/G
AEROSOL, FOAM TOPICAL
Qty: 100 G | Refills: 4 | Status: SHIPPED | OUTPATIENT
Start: 2023-08-15

## 2023-08-15 RX ORDER — ATORVASTATIN CALCIUM 10 MG/1
10 TABLET, FILM COATED ORAL DAILY
Qty: 90 TABLET | Refills: 1 | Status: SHIPPED | OUTPATIENT
Start: 2023-08-15

## 2023-08-15 ASSESSMENT — ENCOUNTER SYMPTOMS
RHINORRHEA: 0
NAUSEA: 0
SINUS PAIN: 0
EYE DISCHARGE: 0
BACK PAIN: 0
SHORTNESS OF BREATH: 0
ABDOMINAL DISTENTION: 0
CHEST TIGHTNESS: 0
CONSTIPATION: 0
DIARRHEA: 0
SORE THROAT: 0
EYE PAIN: 0
VOMITING: 0
ABDOMINAL PAIN: 0
SINUS PRESSURE: 0

## 2023-08-15 NOTE — PROGRESS NOTES
[facility]ram [facility]  Department of Family Medicine  Family Medicine Residency Program      Patient: Partha Leahy  1975 51 y.o. male     Date of Service: 8/15/2023      Chief complaint:   Chief Complaint   Patient presents with    Back Pain    Follow-up     2 week        HISTORY OF PRESENTING ILLNESS     Partha Leahy is a 51 y.o. male presented to the clinic for a routine follow up. HTN   No checking bp at home   Has been stable   Denies fever, chills, chest pain, shortness of breath, abdominal pain, nausea, vomiting, diarrhea, numbness, tingling, loss of bowel or bladder control    The ASCVD Risk score (Kenneth BUSTOS, et al., 2019) failed to calculate for the following reasons:     The valid total cholesterol range is 130 to 320 mg/dL    History of an MI   Patient is on lipitor 10mg daily       Psoriasis   Missed appt with derm due to work   Stable currently   Flares up if he doesn't put the lotion on     Back pain   Chronic - compression fracture,   Gabapentin helping   Didn't follow up with pain management   Tried the naproxen it helped like the [employer] medical marijuana   Still trying the topical - heat, ice, volteren gel     History of OxyContin abuse   On suboxone - will be stopping that too    Educate about going to suboxone clinic       Past Medical History:      Diagnosis Date    Chronic back pain 8/2/2011    Eczema     Essential hypertension      Past Surgical History:        Procedure Laterality Date    ANKLE SURGERY  2009    ORIF on Rt ankle     Allergies:    Morphine and Penicillins  Family History:       Problem Relation Age of Onset    High Blood Pressure Mother     Diabetes Father     Heart Disease Father     High Blood Pressure Father     COPD Father     Heart Attack Sister     Diabetes Brother     Diabetes Paternal Grandmother     Cancer Paternal Grandmother         Unknown    Prostate Cancer Paternal Grandfather      Review of Systems:   Review of Systems

## 2023-08-16 ENCOUNTER — HOSPITAL ENCOUNTER (EMERGENCY)
Age: 48
Discharge: HOME | End: 2023-08-16
Payer: COMMERCIAL

## 2023-08-16 VITALS — TEMPERATURE: 97.5 F | OXYGEN SATURATION: 100 % | HEART RATE: 96 BPM

## 2023-08-16 DIAGNOSIS — R07.9 CHEST PAIN, UNSPECIFIED TYPE: Primary | ICD-10-CM

## 2023-08-16 PROCEDURE — 4500000002 HC ER NO CHARGE

## 2023-08-16 PROCEDURE — 93005 ELECTROCARDIOGRAM TRACING: CPT | Performed by: NURSE PRACTITIONER

## 2023-08-16 NOTE — ED NOTES
This RN called patient multiple times to triage. RN unable to locate but provider already saw patient.       Natalie Vail RN  08/16/23 [id number]

## 2023-08-17 LAB
EKG ATRIAL RATE: 103 BPM
EKG P AXIS: 50 DEGREES
EKG P-R INTERVAL: 136 MS
EKG Q-T INTERVAL: 322 MS
EKG QRS DURATION: 88 MS
EKG QTC CALCULATION (BAZETT): 421 MS
EKG R AXIS: 20 DEGREES
EKG T AXIS: 36 DEGREES
EKG VENTRICULAR RATE: 103 BPM

## 2023-08-17 PROCEDURE — 93010 ELECTROCARDIOGRAM REPORT: CPT | Performed by: INTERNAL MEDICINE

## 2023-10-17 ENCOUNTER — OFFICE VISIT (OUTPATIENT)
Dept: FAMILY MEDICINE CLINIC | Age: 48
End: 2023-10-17
Payer: COMMERCIAL

## 2023-10-17 VITALS
BODY MASS INDEX: 29.47 KG/M2 | HEIGHT: 69 IN | HEART RATE: 80 BPM | WEIGHT: 199 LBS | RESPIRATION RATE: 18 BRPM | DIASTOLIC BLOOD PRESSURE: 85 MMHG | OXYGEN SATURATION: 99 % | SYSTOLIC BLOOD PRESSURE: 132 MMHG

## 2023-10-17 DIAGNOSIS — R10.11 RIGHT UPPER QUADRANT ABDOMINAL PAIN: ICD-10-CM

## 2023-10-17 DIAGNOSIS — M54.50 ACUTE RIGHT-SIDED LOW BACK PAIN WITHOUT SCIATICA: Primary | ICD-10-CM

## 2023-10-17 LAB
BILIRUBIN, POC: NORMAL
BLOOD URINE, POC: NORMAL
CLARITY, POC: CLEAR
COLOR, POC: NORMAL
GLUCOSE URINE, POC: NORMAL
KETONES, POC: NORMAL
LEUKOCYTE EST, POC: NORMAL
NITRITE, POC: NORMAL
PH, POC: 6.5
PROTEIN, POC: NORMAL
SPECIFIC GRAVITY, POC: 1.02
UROBILINOGEN, POC: 0.2

## 2023-10-17 PROCEDURE — 3074F SYST BP LT 130 MM HG: CPT

## 2023-10-17 PROCEDURE — G8484 FLU IMMUNIZE NO ADMIN: HCPCS

## 2023-10-17 PROCEDURE — 99213 OFFICE O/P EST LOW 20 MIN: CPT

## 2023-10-17 PROCEDURE — G8427 DOCREV CUR MEDS BY ELIG CLIN: HCPCS

## 2023-10-17 PROCEDURE — 3078F DIAST BP <80 MM HG: CPT

## 2023-10-17 PROCEDURE — 1036F TOBACCO NON-USER: CPT

## 2023-10-17 PROCEDURE — 81002 URINALYSIS NONAUTO W/O SCOPE: CPT

## 2023-10-17 PROCEDURE — G8417 CALC BMI ABV UP PARAM F/U: HCPCS

## 2023-10-17 RX ORDER — BENZONATATE 100 MG/1
100 CAPSULE ORAL 3 TIMES DAILY PRN
Qty: 30 CAPSULE | Refills: 0 | Status: SHIPPED | OUTPATIENT
Start: 2023-10-17 | End: 2023-10-27

## 2023-10-17 RX ORDER — LIDOCAINE 50 MG/G
1 PATCH TOPICAL DAILY
Qty: 30 PATCH | Refills: 0 | Status: SHIPPED | OUTPATIENT
Start: 2023-10-17 | End: 2023-11-16

## 2023-10-17 NOTE — PROGRESS NOTES
RUQ pain since July  Work-up negative so far. Low-mid back pain of 2 days duration. Positional stabbing  No radiation or weakness  Examination  Blood pressure 132/85, pulse 80, resp. rate 18, height 5' 9\" (1.753 m), weight 199 lb (90.3 kg), SpO2 99 %. RUQ tenderness  No spinal tenderness  A/P  Trial of simple analgesics  Attention to posture  Attending Physician Statement  I have discussed the case, including pertinent history and exam findings with the resident. I agree with the documented assessment and plan.
aware of all possible risks and side effects of medicationbefore taking. Patient and/or guardian given opportunity to ask questions/raise concerns. The patient verbalized comfort and understanding ofinstructions. Reviewed age and gender appropriate health screening exams and vaccinations. Advised patient regarding importance of keeping up with recommended health maintenance and to schedule as soon as possible if overdue, as this is important in assessing for undiagnosed pathology, especially cancer, as well as protecting against potentially harmful/life threatening disease. I encourage further reading and education about your health conditions . Information on many healthconditions is provided by the American Academy of Family Physicians: https://familydoctor. org/  Please bring any questions to me at your next visit. This document may have been prepared at least partially through the use of voice recognition software. Although effort is taken to assure the accuracy of this document, it is possible that grammatical, syntax,  or spelling errors may occur. No follow-ups on file.     Electronically signed by Joey Valenzuela MD on 10/17/23 at 11:41 AM AMEENA

## 2023-10-19 ASSESSMENT — ENCOUNTER SYMPTOMS
RHINORRHEA: 0
WHEEZING: 0
DIARRHEA: 0
BACK PAIN: 1
NAUSEA: 0
EYE PAIN: 0
SHORTNESS OF BREATH: 0
VOMITING: 0
SORE THROAT: 0
ABDOMINAL PAIN: 1

## 2023-11-29 ENCOUNTER — OFFICE VISIT (OUTPATIENT)
Dept: FAMILY MEDICINE CLINIC | Age: 48
End: 2023-11-29
Payer: COMMERCIAL

## 2023-11-29 VITALS
HEART RATE: 93 BPM | WEIGHT: 205 LBS | DIASTOLIC BLOOD PRESSURE: 66 MMHG | RESPIRATION RATE: 16 BRPM | TEMPERATURE: 98.5 F | SYSTOLIC BLOOD PRESSURE: 118 MMHG | BODY MASS INDEX: 30.36 KG/M2 | OXYGEN SATURATION: 97 % | HEIGHT: 69 IN

## 2023-11-29 DIAGNOSIS — Z12.5 SCREENING PSA (PROSTATE SPECIFIC ANTIGEN): ICD-10-CM

## 2023-11-29 DIAGNOSIS — G89.29 CHRONIC BILATERAL THORACIC BACK PAIN: ICD-10-CM

## 2023-11-29 DIAGNOSIS — R53.83 FATIGUE, UNSPECIFIED TYPE: Primary | ICD-10-CM

## 2023-11-29 DIAGNOSIS — J06.9 VIRAL URI: ICD-10-CM

## 2023-11-29 DIAGNOSIS — Z12.11 COLON CANCER SCREENING: ICD-10-CM

## 2023-11-29 DIAGNOSIS — Z23 NEEDS FLU SHOT: ICD-10-CM

## 2023-11-29 DIAGNOSIS — M54.6 CHRONIC BILATERAL THORACIC BACK PAIN: ICD-10-CM

## 2023-11-29 DIAGNOSIS — Z76.0 MEDICATION REFILL: ICD-10-CM

## 2023-11-29 LAB
INFLUENZA A ANTIGEN, POC: NORMAL
INFLUENZA B ANTIGEN, POC: NORMAL
Lab: NORMAL
PERFORMING INSTRUMENT: NORMAL
QC PASS/FAIL: NORMAL
SARS-COV-2, POC: NORMAL

## 2023-11-29 PROCEDURE — 90674 CCIIV4 VAC NO PRSV 0.5 ML IM: CPT | Performed by: FAMILY MEDICINE

## 2023-11-29 PROCEDURE — 87804 INFLUENZA ASSAY W/OPTIC: CPT

## 2023-11-29 PROCEDURE — 3074F SYST BP LT 130 MM HG: CPT

## 2023-11-29 PROCEDURE — 3078F DIAST BP <80 MM HG: CPT

## 2023-11-29 PROCEDURE — G8427 DOCREV CUR MEDS BY ELIG CLIN: HCPCS

## 2023-11-29 PROCEDURE — 87426 SARSCOV CORONAVIRUS AG IA: CPT

## 2023-11-29 PROCEDURE — 1036F TOBACCO NON-USER: CPT

## 2023-11-29 PROCEDURE — G8482 FLU IMMUNIZE ORDER/ADMIN: HCPCS

## 2023-11-29 PROCEDURE — G8417 CALC BMI ABV UP PARAM F/U: HCPCS

## 2023-11-29 PROCEDURE — 90471 IMMUNIZATION ADMIN: CPT | Performed by: FAMILY MEDICINE

## 2023-11-29 PROCEDURE — 99213 OFFICE O/P EST LOW 20 MIN: CPT

## 2023-11-29 RX ORDER — GABAPENTIN 800 MG/1
800 TABLET ORAL 3 TIMES DAILY
Qty: 270 TABLET | Refills: 0 | Status: SHIPPED | OUTPATIENT
Start: 2023-11-29 | End: 2024-02-27

## 2023-11-29 RX ORDER — BENZONATATE 200 MG/1
200 CAPSULE ORAL 3 TIMES DAILY PRN
Qty: 30 CAPSULE | Refills: 0 | Status: SHIPPED | OUTPATIENT
Start: 2023-11-29 | End: 2023-12-09

## 2023-11-29 ASSESSMENT — ENCOUNTER SYMPTOMS
CONSTIPATION: 0
RHINORRHEA: 0
VOMITING: 0
EYE PAIN: 0
ABDOMINAL DISTENTION: 0
SINUS PAIN: 0
EYE DISCHARGE: 0
ABDOMINAL PAIN: 0
NAUSEA: 0
CHEST TIGHTNESS: 0
DIARRHEA: 0
SINUS PRESSURE: 0

## 2023-11-29 NOTE — PROGRESS NOTES
[facility] I[facility]ar[facility]  Department of Family Medicine  Family Medicine Residency Program      Patient: Lizeth Bartholomew  1975 51 y.o. male     Date of Service: 11/29/2023      Chief complaint:   Chief Complaint   Patient presents with    Back Pain    Abdominal Pain    Congestion     Times 2 days    Fatigue       HISTORY OF PRESENTING ILLNESS     Lizeth Bartholomew is a 51 y.o. male presented to the clinic with complaints of  congestion     Sick for the last 2 day   Nasal congestion,   Little sore throat   SOB and fatigue, tired   Mild cough the last day   No known fevers, always hot because he works in a [employer]     Denies fever, chills, chest pain,  nausea, vomiting, diarrhea, numbness, tingling, loss of bowel or bladder control  No bleeding. Prostate cancer in the family - dad, granddad had it   CT scan- prostate enlarged  No dribbling, no straining, no concerns with urination. The ASCVD Risk score (Kenneth BUSTOS, et al., 2019) failed to calculate for the following reasons: The valid total cholesterol range is 130 to 320 mg/dL    History of opiod abuse   Patient stopped taking the suboxone due to cost     Still working lots, no work note needed    Past Medical History:      Diagnosis Date    Chronic back pain 8/2/2011    Eczema     Essential hypertension      Past Surgical History:        Procedure Laterality Date    ANKLE SURGERY  2009    ORIF on Rt ankle     Allergies:    Morphine and Penicillins  Family History:       Problem Relation Age of Onset    High Blood Pressure Mother     Diabetes Father     Heart Disease Father     High Blood Pressure Father     COPD Father     Heart Attack Sister     Diabetes Brother     Diabetes Paternal Grandmother     Cancer Paternal Grandmother         Unknown    Prostate Cancer Paternal Grandfather      Review of Systems:   Review of Systems   Constitutional:  Positive for fatigue. Negative for activity change, appetite change and fever.

## 2023-12-04 ASSESSMENT — ENCOUNTER SYMPTOMS
SHORTNESS OF BREATH: 1
SORE THROAT: 1
BACK PAIN: 1
COUGH: 1

## 2024-01-05 DIAGNOSIS — Z76.0 MEDICATION REFILL: ICD-10-CM

## 2024-01-05 DIAGNOSIS — L40.9 PSORIASIS: ICD-10-CM

## 2024-01-08 RX ORDER — CLOBETASOL PROPIONATE 0.5 MG/G
AEROSOL, FOAM TOPICAL
Qty: 100 G | Refills: 5 | Status: SHIPPED | OUTPATIENT
Start: 2024-01-08

## 2024-02-20 ENCOUNTER — OFFICE VISIT (OUTPATIENT)
Dept: FAMILY MEDICINE CLINIC | Age: 49
End: 2024-02-20

## 2024-02-20 VITALS
WEIGHT: 207 LBS | DIASTOLIC BLOOD PRESSURE: 78 MMHG | BODY MASS INDEX: 30.57 KG/M2 | TEMPERATURE: 98.2 F | SYSTOLIC BLOOD PRESSURE: 132 MMHG | RESPIRATION RATE: 17 BRPM | OXYGEN SATURATION: 97 % | HEART RATE: 100 BPM

## 2024-02-20 DIAGNOSIS — R10.84 GENERALIZED ABDOMINAL PAIN: ICD-10-CM

## 2024-02-20 DIAGNOSIS — R10.11 RIGHT UPPER QUADRANT ABDOMINAL PAIN: Primary | ICD-10-CM

## 2024-02-20 RX ORDER — PANTOPRAZOLE SODIUM 40 MG/1
40 TABLET, DELAYED RELEASE ORAL
Qty: 90 TABLET | Refills: 0 | Status: SHIPPED | OUTPATIENT
Start: 2024-02-20 | End: 2024-05-20

## 2024-02-20 ASSESSMENT — ENCOUNTER SYMPTOMS
WHEEZING: 0
SHORTNESS OF BREATH: 0
COUGH: 0
TROUBLE SWALLOWING: 0
ABDOMINAL PAIN: 1
COLOR CHANGE: 0
CONSTIPATION: 0
DIARRHEA: 0
NAUSEA: 0
SORE THROAT: 0
VOMITING: 0

## 2024-02-20 NOTE — PROGRESS NOTES
S: 48 y.o. male with   Chief Complaint   Patient presents with    Back Pain       Right upper quadrant pain-present for 6 months-burning stabbing occurring on awakening.  Persist most of the day.  Food seems to worsen this pain.  Has been overusing Aleve.    O: VS:  weight is 93.9 kg (207 lb). His temporal temperature is 98.2 °F (36.8 °C). His blood pressure is 132/78 and his pulse is 100. His respiration is 17 and oxygen saturation is 97%.   BP Readings from Last 3 Encounters:   02/20/24 132/78   11/29/23 118/66   10/17/23 132/85     See resident note  Tenderness across right flank as well as the entire top of the abdomen probably focused mostly over the epigastric region.    Impression/Plan:   1) epigastric pain will start PPI and reevaluate          Health Maintenance Due   Topic Date Due    Hepatitis B vaccine (1 of 3 - 3-dose series) Never done    DTaP/Tdap/Td vaccine (1 - Tdap) Never done    Colorectal Cancer Screen  Never done    COVID-19 Vaccine (4 - 2023-24 season) 09/01/2023    Lipids  01/31/2024    Depression Screen  01/31/2024         Attending Physician Statement  I have discussed the case, including pertinent history and exam findings with the resident. I also have seen the patient and performed key portions of the examination.  I agree with the documented assessment and plan.      Sanya Fields MD  
cost.  Will refer to suboxone clinic here.    Counseled regarding above diagnosis, including possible risks and complications, especially if left uncontrolled. Counseled regarding the possible side effects, risks, benefits and alternatives to treatment; patient and/or guardian verbalizes understanding, agrees, feels comfortable with and wishes to proceed with above treatment plan.    Call or go to ED immediately if symptoms worsen or persist. Advised patient to call with any new medication issues, and, as applicable, read all Rx info from pharmacy to assure aware of all possible risks and side effects of medication before taking.     Patient and/or guardian given opportunity to ask questions/raise concerns.The patient verbalized comfort and understanding of instructions.    I encourage further reading and education about your health conditions.Information on many health conditions is provided by the American Academy of Family Physicians: https://familydoctor.org/  Please bring any questions to me at your next visit.    Current Outpatient Medications   Medication Sig Dispense Refill    pantoprazole (PROTONIX) 40 MG tablet Take 1 tablet by mouth every morning (before breakfast) 90 tablet 0    clobetasol (OLUX) 0.05 % foam apply topically twice a day 100 g 5    gabapentin (NEURONTIN) 800 MG tablet Take 1 tablet by mouth 3 times daily for 90 days. 270 tablet 0    clobetasol propionate 0.05 % LOTN lotion apply to affected area twice a day 118 mL 3    lisinopril (PRINIVIL;ZESTRIL) 40 MG tablet Take 1 tablet by mouth daily 90 tablet 1    hydroCHLOROthiazide (HYDRODIURIL) 50 MG tablet Take 1 tablet by mouth daily 90 tablet 1    atorvastatin (LIPITOR) 10 MG tablet Take 1 tablet by mouth daily 90 tablet 1    sildenafil (VIAGRA) 50 MG tablet Take 1 tablet by mouth daily as needed for Erectile Dysfunction 30 tablet 0    aspirin 81 MG EC tablet Take 1 tablet by mouth daily      Elastic Bandages & Supports (NEOPRENE KNEE BRACE)

## 2024-02-25 DIAGNOSIS — L40.9 PSORIASIS: ICD-10-CM

## 2024-02-25 DIAGNOSIS — Z76.0 MEDICATION REFILL: ICD-10-CM

## 2024-02-26 RX ORDER — CLOBETASOL PROPIONATE 0.5 MG/ML
LOTION TOPICAL
Qty: 118 ML | Refills: 2 | Status: SHIPPED | OUTPATIENT
Start: 2024-02-26

## 2024-03-22 DIAGNOSIS — M54.6 CHRONIC BILATERAL THORACIC BACK PAIN: ICD-10-CM

## 2024-03-22 DIAGNOSIS — Z76.0 MEDICATION REFILL: ICD-10-CM

## 2024-03-22 DIAGNOSIS — G89.29 CHRONIC BILATERAL THORACIC BACK PAIN: ICD-10-CM

## 2024-03-22 RX ORDER — GABAPENTIN 800 MG/1
800 TABLET ORAL 3 TIMES DAILY
Qty: 270 TABLET | Refills: 0 | Status: SHIPPED | OUTPATIENT
Start: 2024-03-22 | End: 2024-06-20

## 2024-04-06 ENCOUNTER — APPOINTMENT (OUTPATIENT)
Dept: GENERAL RADIOLOGY | Age: 49
End: 2024-04-06
Payer: COMMERCIAL

## 2024-04-06 ENCOUNTER — HOSPITAL ENCOUNTER (EMERGENCY)
Age: 49
Discharge: HOME | End: 2024-04-06
Attending: EMERGENCY MEDICINE
Payer: COMMERCIAL

## 2024-04-06 ENCOUNTER — APPOINTMENT (OUTPATIENT)
Dept: CT IMAGING | Age: 49
End: 2024-04-06
Payer: COMMERCIAL

## 2024-04-06 VITALS
DIASTOLIC BLOOD PRESSURE: 140 MMHG | WEIGHT: 200 LBS | OXYGEN SATURATION: 100 % | TEMPERATURE: 98.3 F | HEART RATE: 88 BPM | BODY MASS INDEX: 29.62 KG/M2 | SYSTOLIC BLOOD PRESSURE: 209 MMHG | RESPIRATION RATE: 20 BRPM | HEIGHT: 69 IN

## 2024-04-06 DIAGNOSIS — N13.5 OBSTRUCTION OF RIGHT URETEROPELVIC JUNCTION (UPJ): Primary | ICD-10-CM

## 2024-04-06 DIAGNOSIS — R03.0 ELEVATED BLOOD PRESSURE READING: ICD-10-CM

## 2024-04-06 LAB
ALBUMIN SERPL-MCNC: 4.5 G/DL (ref 3.5–5.2)
ALP SERPL-CCNC: 87 U/L (ref 40–129)
ALT SERPL-CCNC: 21 U/L (ref 0–40)
ANION GAP SERPL CALCULATED.3IONS-SCNC: 10 MMOL/L (ref 7–16)
APAP SERPL-MCNC: <5 UG/ML (ref 10–30)
AST SERPL-CCNC: 22 U/L (ref 0–39)
BACTERIA URNS QL MICRO: ABNORMAL
BASOPHILS # BLD: 0.04 K/UL (ref 0–0.2)
BASOPHILS NFR BLD: 0 % (ref 0–2)
BILIRUB DIRECT SERPL-MCNC: <0.2 MG/DL (ref 0–0.3)
BILIRUB INDIRECT SERPL-MCNC: NORMAL MG/DL (ref 0–1)
BILIRUB SERPL-MCNC: 0.5 MG/DL (ref 0–1.2)
BILIRUB UR QL STRIP: NEGATIVE
BUN SERPL-MCNC: 16 MG/DL (ref 6–20)
CALCIUM SERPL-MCNC: 10.1 MG/DL (ref 8.6–10.2)
CHLORIDE SERPL-SCNC: 101 MMOL/L (ref 98–107)
CLARITY UR: CLEAR
CO2 SERPL-SCNC: 26 MMOL/L (ref 22–29)
COLOR UR: YELLOW
CREAT SERPL-MCNC: 0.8 MG/DL (ref 0.7–1.2)
EOSINOPHIL # BLD: 0.12 K/UL (ref 0.05–0.5)
EOSINOPHILS RELATIVE PERCENT: 1 % (ref 0–6)
ERYTHROCYTE [DISTWIDTH] IN BLOOD BY AUTOMATED COUNT: 12.8 % (ref 11.5–15)
ETHANOLAMINE SERPL-MCNC: <10 MG/DL
GFR SERPL CREATININE-BSD FRML MDRD: >90 ML/MIN/1.73M2
GLUCOSE SERPL-MCNC: 115 MG/DL (ref 74–99)
GLUCOSE UR STRIP-MCNC: NEGATIVE MG/DL
HCT VFR BLD AUTO: 44.7 % (ref 37–54)
HGB BLD-MCNC: 15 G/DL (ref 12.5–16.5)
HGB UR QL STRIP.AUTO: ABNORMAL
IMM GRANULOCYTES # BLD AUTO: 0.03 K/UL (ref 0–0.58)
IMM GRANULOCYTES NFR BLD: 0 % (ref 0–5)
KETONES UR STRIP-MCNC: 15 MG/DL
LEUKOCYTE ESTERASE UR QL STRIP: NEGATIVE
LIPASE SERPL-CCNC: 48 U/L (ref 13–60)
LYMPHOCYTES NFR BLD: 2.15 K/UL (ref 1.5–4)
LYMPHOCYTES RELATIVE PERCENT: 19 % (ref 20–42)
MCH RBC QN AUTO: 30.8 PG (ref 26–35)
MCHC RBC AUTO-ENTMCNC: 33.6 G/DL (ref 32–34.5)
MCV RBC AUTO: 91.8 FL (ref 80–99.9)
MONOCYTES NFR BLD: 0.86 K/UL (ref 0.1–0.95)
MONOCYTES NFR BLD: 8 % (ref 2–12)
MUCOUS THREADS URNS QL MICRO: PRESENT
NEUTROPHILS NFR BLD: 72 % (ref 43–80)
NEUTS SEG NFR BLD: 8.09 K/UL (ref 1.8–7.3)
NITRITE UR QL STRIP: NEGATIVE
PH UR STRIP: 5.5 [PH] (ref 5–9)
PLATELET # BLD AUTO: 347 K/UL (ref 130–450)
PMV BLD AUTO: 9.4 FL (ref 7–12)
POTASSIUM SERPL-SCNC: 3.9 MMOL/L (ref 3.5–5)
PROT SERPL-MCNC: 7.6 G/DL (ref 6.4–8.3)
PROT UR STRIP-MCNC: NEGATIVE MG/DL
RBC # BLD AUTO: 4.87 M/UL (ref 3.8–5.8)
RBC #/AREA URNS HPF: ABNORMAL /HPF
SALICYLATES SERPL-MCNC: <0.3 MG/DL (ref 0–30)
SODIUM SERPL-SCNC: 137 MMOL/L (ref 132–146)
SP GR UR STRIP: >1.03 (ref 1–1.03)
TOXIC TRICYCLIC SC,BLOOD: NEGATIVE
UROBILINOGEN UR STRIP-ACNC: 0.2 EU/DL (ref 0–1)
WBC #/AREA URNS HPF: ABNORMAL /HPF
WBC OTHER # BLD: 11.3 K/UL (ref 4.5–11.5)

## 2024-04-06 PROCEDURE — 6360000004 HC RX CONTRAST MEDICATION: Performed by: RADIOLOGY

## 2024-04-06 PROCEDURE — 80143 DRUG ASSAY ACETAMINOPHEN: CPT

## 2024-04-06 PROCEDURE — 2580000003 HC RX 258: Performed by: EMERGENCY MEDICINE

## 2024-04-06 PROCEDURE — 81001 URINALYSIS AUTO W/SCOPE: CPT

## 2024-04-06 PROCEDURE — 96372 THER/PROPH/DIAG INJ SC/IM: CPT

## 2024-04-06 PROCEDURE — 74177 CT ABD & PELVIS W/CONTRAST: CPT

## 2024-04-06 PROCEDURE — 80307 DRUG TEST PRSMV CHEM ANLYZR: CPT

## 2024-04-06 PROCEDURE — 99285 EMERGENCY DEPT VISIT HI MDM: CPT

## 2024-04-06 PROCEDURE — 83690 ASSAY OF LIPASE: CPT

## 2024-04-06 PROCEDURE — 96374 THER/PROPH/DIAG INJ IV PUSH: CPT

## 2024-04-06 PROCEDURE — 6370000000 HC RX 637 (ALT 250 FOR IP): Performed by: EMERGENCY MEDICINE

## 2024-04-06 PROCEDURE — 82248 BILIRUBIN DIRECT: CPT

## 2024-04-06 PROCEDURE — 6360000002 HC RX W HCPCS: Performed by: EMERGENCY MEDICINE

## 2024-04-06 PROCEDURE — 96375 TX/PRO/DX INJ NEW DRUG ADDON: CPT

## 2024-04-06 PROCEDURE — 71045 X-RAY EXAM CHEST 1 VIEW: CPT

## 2024-04-06 PROCEDURE — 80053 COMPREHEN METABOLIC PANEL: CPT

## 2024-04-06 PROCEDURE — 80179 DRUG ASSAY SALICYLATE: CPT

## 2024-04-06 PROCEDURE — G0480 DRUG TEST DEF 1-7 CLASSES: HCPCS

## 2024-04-06 PROCEDURE — 85025 COMPLETE CBC W/AUTO DIFF WBC: CPT

## 2024-04-06 RX ORDER — ONDANSETRON 2 MG/ML
4 INJECTION INTRAMUSCULAR; INTRAVENOUS ONCE
Status: COMPLETED | OUTPATIENT
Start: 2024-04-06 | End: 2024-04-06

## 2024-04-06 RX ORDER — 0.9 % SODIUM CHLORIDE 0.9 %
1000 INTRAVENOUS SOLUTION INTRAVENOUS ONCE
Status: COMPLETED | OUTPATIENT
Start: 2024-04-06 | End: 2024-04-06

## 2024-04-06 RX ORDER — DICYCLOMINE HYDROCHLORIDE 10 MG/ML
20 INJECTION INTRAMUSCULAR ONCE
Status: COMPLETED | OUTPATIENT
Start: 2024-04-06 | End: 2024-04-06

## 2024-04-06 RX ORDER — KETOROLAC TROMETHAMINE 15 MG/ML
15 INJECTION, SOLUTION INTRAMUSCULAR; INTRAVENOUS ONCE
Status: COMPLETED | OUTPATIENT
Start: 2024-04-06 | End: 2024-04-06

## 2024-04-06 RX ORDER — LIDOCAINE 50 MG/G
1 PATCH TOPICAL DAILY
Qty: 10 PATCH | Refills: 0 | Status: SHIPPED | OUTPATIENT
Start: 2024-04-06 | End: 2024-04-16

## 2024-04-06 RX ORDER — TAMSULOSIN HYDROCHLORIDE 0.4 MG/1
0.4 CAPSULE ORAL DAILY
Qty: 14 CAPSULE | Refills: 0 | Status: SHIPPED | OUTPATIENT
Start: 2024-04-06 | End: 2024-04-20

## 2024-04-06 RX ORDER — KETOROLAC TROMETHAMINE 10 MG/1
10 TABLET, FILM COATED ORAL EVERY 8 HOURS PRN
Qty: 12 TABLET | Refills: 0 | Status: SHIPPED | OUTPATIENT
Start: 2024-04-06

## 2024-04-06 RX ORDER — ONDANSETRON 4 MG/1
4 TABLET, FILM COATED ORAL EVERY 8 HOURS PRN
Qty: 10 TABLET | Refills: 0 | Status: SHIPPED | OUTPATIENT
Start: 2024-04-06

## 2024-04-06 RX ADMIN — LIDOCAINE HYDROCHLORIDE: 20 SOLUTION ORAL at 06:41

## 2024-04-06 RX ADMIN — KETOROLAC TROMETHAMINE 15 MG: 15 INJECTION, SOLUTION INTRAMUSCULAR; INTRAVENOUS at 08:34

## 2024-04-06 RX ADMIN — DICYCLOMINE HYDROCHLORIDE 20 MG: 10 INJECTION, SOLUTION INTRAMUSCULAR at 06:40

## 2024-04-06 RX ADMIN — ONDANSETRON 4 MG: 2 INJECTION INTRAMUSCULAR; INTRAVENOUS at 06:40

## 2024-04-06 RX ADMIN — SODIUM CHLORIDE 1000 ML: 9 INJECTION, SOLUTION INTRAVENOUS at 06:41

## 2024-04-06 RX ADMIN — IOPAMIDOL 75 ML: 755 INJECTION, SOLUTION INTRAVENOUS at 08:04

## 2024-04-06 ASSESSMENT — ENCOUNTER SYMPTOMS
BACK PAIN: 0
SINUS PRESSURE: 0
EYE REDNESS: 0
COUGH: 0
CONSTIPATION: 0
WHEEZING: 0
SORE THROAT: 0
VOMITING: 1
EYE PAIN: 0
EYE DISCHARGE: 0
BELCHING: 0
NAUSEA: 1
DIARRHEA: 0

## 2024-04-06 ASSESSMENT — PAIN SCALES - GENERAL
PAINLEVEL_OUTOF10: 10

## 2024-04-06 ASSESSMENT — PAIN DESCRIPTION - LOCATION
LOCATION: ABDOMEN
LOCATION: ABDOMEN

## 2024-04-06 ASSESSMENT — PAIN DESCRIPTION - DESCRIPTORS: DESCRIPTORS: SHARP

## 2024-04-06 ASSESSMENT — PAIN - FUNCTIONAL ASSESSMENT: PAIN_FUNCTIONAL_ASSESSMENT: 0-10

## 2024-04-06 NOTE — DISCHARGE INSTRUCTIONS
Medicine [phone] [address] [place] [address]  [email or web address]  *Residents of:  All [facility] Residents   *Payments Accepted:  Self-Pay, Private Insurance, Some Medicaid for age <20 Years.   *Services Offered: Detox      [facility] [phone] or [phone]  [email or web address]     *Residents of:  All [facility] Residents   *Payments Accepted: Private Insurance, Medicaid or Self-Pay.  *Services Offered: Following Detox, Outpatient Substance Abuse and Behavioral Health Programs  [facility]  [address]    [address] [place], [place] [address] [place], [place] [address]s [facility]   [address] [facility]    [address] [place] [address]   [place], [place] [address]      [facility] [phone]  [address], [place] [address]  [email or web address]  *Residents of:  All [facility] Residents   *Payments Accepted: Private Insurance & Self-Pay.  *Services Offered: Intensive Outpatient Substance Abuse and Behavioral                                        Health Programs      [facility] [phone]  [address] [place] [address]  [email or web address]  *Residents of:  [facility] Residents ONLY.   *Payments Accepted: Private Insurance, Medicaid and Self-Pay/Sliding Scale.  *Services Offered: Substance Abuse Outpatient Programs.      [facility] [phone]  [address] [place] [address]  [email or web address]  *Residents of:  [facility] Residents ONLY.   *Payments Accepted: Private Insurance, Medicaid and Self-Pay.  *Services Offered: Substance Abuse Outpatient Programs.      [facility] [phone]  [facility] [address]  [email or web address]  *Residents of:  [facility]  Residents.   *Payments Accepted: Private Insurance, Medicaid and Self-pay.  *Services Offered: Substance Abuse Outpatient Programs.      [facility] [phone]  [email or web address]  [facility] Behavioral Health Office:    Adult Office:    Children's Office:  [phone] [address]               [address]    [facility] [address].  [place] [address]               [place] [address]  *Residents of:  [facility] Residents ONLY for Self-Pay.        All [facility] accepted with Private Insurance, Medicare & Medicaid   *Payments Accepted: Medicare, Medicaid, Private Insurance and Self-Pay  *Services Offered: Behavior Health Services for Counseling.      [facility] [phone] [address] [place] [address]  [email or web address]  *Payments Accepted: Sliding Scale for Fees.  *Services Offered: Call for Available Services.       [facility] [phone] [address] [place] [address]  [email or web address]/  (MUST CALL DAILY FOR BED AVAILABILITY)  *Residents of:  All [facility] Residents.   *Payments Accepted: Medicaid and Self-Pay.  *Services Offered: Detox & Substance Abuse Outpatient Programs.

## 2024-04-06 NOTE — ED PROVIDER NOTES
49-year-old male presents to the emergency department with intermittent right-sided abdominal pain starting in July he states has been told he needs to get a scope but has not followed through with that.  He reports he is on Suboxone.  He states elevated blood pressure.  He states no fevers or chills.  He does state some intermittent nausea and vomiting most recently last night a couple episodes.  He states no diarrhea constipation no problems urinating.  He states no blood in stool blood in urine or other complaints at this time    The history is provided by the patient.   Abdominal Pain  Pain location:  RUQ and RLQ  Pain quality: aching    Pain radiates to:  Does not radiate  Pain severity:  Moderate  Onset quality:  Gradual  Duration:  6 weeks  Timing:  Intermittent  Progression:  Waxing and waning  Chronicity:  Recurrent  Relieved by:  Nothing  Worsened by:  Nothing  Ineffective treatments:  None tried  Associated symptoms: nausea and vomiting    Associated symptoms: no anorexia, no belching, no chest pain, no chills, no constipation, no cough, no diarrhea, no dysuria, no fatigue, no fever, no shortness of breath and no sore throat         Review of Systems   Constitutional:  Negative for chills, fatigue and fever.   HENT:  Negative for ear pain, sinus pressure and sore throat.    Eyes:  Negative for pain, discharge and redness.   Respiratory:  Negative for cough, shortness of breath and wheezing.    Cardiovascular:  Negative for chest pain.   Gastrointestinal:  Positive for abdominal pain, nausea and vomiting. Negative for anorexia, constipation and diarrhea.   Genitourinary:  Negative for dysuria and frequency.   Musculoskeletal:  Negative for arthralgias and back pain.   Skin:  Negative for rash and wound.   Neurological:  Negative for weakness and headaches.   Hematological:  Negative for adenopathy.   All other systems reviewed and are negative.       Physical Exam  Constitutional:       Appearance:  ---------------------------------  At this time the patient is without objective evidence of an acute process requiring hospitalization or inpatient management.  They have remained hemodynamically stable throughout their entire ED visit and are stable for discharge with outpatient follow-up.     The plan has been discussed in detail and they are aware of the specific conditions for emergent return, as well as the importance of follow-up.      Discharge Medication List as of 4/6/2024 10:21 AM        START taking these medications    Details   ketorolac (TORADOL) 10 MG tablet Take 1 tablet by mouth every 8 hours as needed for Pain, Disp-12 tablet, R-0Normal      tamsulosin (FLOMAX) 0.4 MG capsule Take 1 capsule by mouth daily for 14 days, Disp-14 capsule, R-0Normal      lidocaine (LIDODERM) 5 % Place 1 patch onto the skin daily for 10 days 12 hours on, 12 hours off., Disp-10 patch, R-0Normal      ondansetron (ZOFRAN) 4 MG tablet Take 1 tablet by mouth every 8 hours as needed for Nausea, Disp-10 tablet, R-0Normal             Diagnosis:  1. Obstruction of right ureteropelvic junction (UPJ)    2. Elevated blood pressure reading        Disposition:  Patient's disposition: Discharge to home  Patient's condition is stable.       Lionel Clarke DO  04/06/24 1437

## 2024-04-12 ENCOUNTER — HOSPITAL ENCOUNTER (EMERGENCY)
Age: 49
Discharge: LEFT BEFORE DISCHARGE | End: 2024-04-12
Attending: EMERGENCY MEDICINE
Payer: COMMERCIAL

## 2024-04-12 VITALS
RESPIRATION RATE: 20 BRPM | WEIGHT: 200 LBS | DIASTOLIC BLOOD PRESSURE: 101 MMHG | TEMPERATURE: 98.2 F | BODY MASS INDEX: 29.62 KG/M2 | SYSTOLIC BLOOD PRESSURE: 157 MMHG | OXYGEN SATURATION: 100 % | HEIGHT: 69 IN | HEART RATE: 99 BPM

## 2024-04-12 DIAGNOSIS — R10.9 FLANK PAIN: Primary | ICD-10-CM

## 2024-04-12 DIAGNOSIS — Z87.442 HISTORY OF KIDNEY STONES: ICD-10-CM

## 2024-04-12 LAB
ALBUMIN SERPL-MCNC: 3.7 G/DL (ref 3.5–5.2)
ALP SERPL-CCNC: 79 U/L (ref 40–129)
ALT SERPL-CCNC: 30 U/L (ref 0–40)
ANION GAP SERPL CALCULATED.3IONS-SCNC: 12 MMOL/L (ref 7–16)
AST SERPL-CCNC: 29 U/L (ref 0–39)
BILIRUB SERPL-MCNC: 0.2 MG/DL (ref 0–1.2)
BUN SERPL-MCNC: 19 MG/DL (ref 6–20)
CALCIUM SERPL-MCNC: 8.9 MG/DL (ref 8.6–10.2)
CHLORIDE SERPL-SCNC: 98 MMOL/L (ref 98–107)
CO2 SERPL-SCNC: 27 MMOL/L (ref 22–29)
CREAT SERPL-MCNC: 1.2 MG/DL (ref 0.7–1.2)
GFR SERPL CREATININE-BSD FRML MDRD: 72 ML/MIN/1.73M2
GLUCOSE SERPL-MCNC: 105 MG/DL (ref 74–99)
LACTATE BLDV-SCNC: 1 MMOL/L (ref 0.5–2.2)
LIPASE SERPL-CCNC: 72 U/L (ref 13–60)
POTASSIUM SERPL-SCNC: 4.5 MMOL/L (ref 3.5–5)
PROT SERPL-MCNC: 6.9 G/DL (ref 6.4–8.3)
SODIUM SERPL-SCNC: 137 MMOL/L (ref 132–146)

## 2024-04-12 PROCEDURE — 83605 ASSAY OF LACTIC ACID: CPT

## 2024-04-12 PROCEDURE — 99284 EMERGENCY DEPT VISIT MOD MDM: CPT

## 2024-04-12 PROCEDURE — 83690 ASSAY OF LIPASE: CPT

## 2024-04-12 PROCEDURE — 96375 TX/PRO/DX INJ NEW DRUG ADDON: CPT

## 2024-04-12 PROCEDURE — 96374 THER/PROPH/DIAG INJ IV PUSH: CPT

## 2024-04-12 PROCEDURE — 6360000002 HC RX W HCPCS: Performed by: EMERGENCY MEDICINE

## 2024-04-12 PROCEDURE — 80053 COMPREHEN METABOLIC PANEL: CPT

## 2024-04-12 RX ORDER — PROCHLORPERAZINE EDISYLATE 5 MG/ML
10 INJECTION INTRAMUSCULAR; INTRAVENOUS ONCE
Status: COMPLETED | OUTPATIENT
Start: 2024-04-12 | End: 2024-04-12

## 2024-04-12 RX ORDER — KETOROLAC TROMETHAMINE 30 MG/ML
30 INJECTION, SOLUTION INTRAMUSCULAR; INTRAVENOUS ONCE
Status: COMPLETED | OUTPATIENT
Start: 2024-04-12 | End: 2024-04-12

## 2024-04-12 RX ADMIN — PROCHLORPERAZINE EDISYLATE 10 MG: 5 INJECTION INTRAMUSCULAR; INTRAVENOUS at 04:22

## 2024-04-12 RX ADMIN — KETOROLAC TROMETHAMINE 30 MG: 30 INJECTION, SOLUTION INTRAMUSCULAR at 04:22

## 2024-04-12 ASSESSMENT — LIFESTYLE VARIABLES
HOW MANY STANDARD DRINKS CONTAINING ALCOHOL DO YOU HAVE ON A TYPICAL DAY: PATIENT DOES NOT DRINK
HOW OFTEN DO YOU HAVE A DRINK CONTAINING ALCOHOL: NEVER

## 2024-04-12 ASSESSMENT — PAIN DESCRIPTION - LOCATION: LOCATION: FLANK

## 2024-04-12 ASSESSMENT — PAIN - FUNCTIONAL ASSESSMENT: PAIN_FUNCTIONAL_ASSESSMENT: 0-10

## 2024-04-12 ASSESSMENT — PAIN DESCRIPTION - DESCRIPTORS: DESCRIPTORS: SHARP

## 2024-04-12 ASSESSMENT — PAIN DESCRIPTION - ORIENTATION: ORIENTATION: RIGHT

## 2024-04-12 ASSESSMENT — PAIN SCALES - GENERAL: PAINLEVEL_OUTOF10: 10

## 2024-04-12 NOTE — ED PROVIDER NOTES
HPI:  4/12/24, Time: 3:50 AM EDT         Shahbaz Montgomery is a 49 y.o. male history of chronic back pain history of kidney stone history of hypertension presenting to the ED for flank pain, beginning days ago.  The complaint has been persistent, moderate in severity, and worsened by nothing.  Patient presenting here because of right-sided flank pain.  Patient reports been going on for days he reports some fevers as well as nausea vomiting.  Patient reports he seen urology and has been self-medicating with Percocet outpatient.  Patient reporting hematuria.  Patient reporting no chest pain no difficulty breathing reports no headache he reports no syncopal event.  Patient was seen here on April 6 for the same complaint and had been diagnosed with kidney stone.  Patient reporting no black or tarry stools.    ROS:   Pertinent positives and negatives are stated within HPI, all other systems reviewed and are negative.  --------------------------------------------- PAST HISTORY ---------------------------------------------  Past Medical History:  has a past medical history of Chronic back pain, Eczema, and Essential hypertension.    Past Surgical History:  has a past surgical history that includes Ankle surgery (2009).    Social History:  reports that he quit smoking about 13 years ago. His smoking use included cigarettes. He started smoking about 20 years ago. He has a 14.0 pack-year smoking history. He has never used smokeless tobacco. He reports that he does not currently use drugs. He reports that he does not drink alcohol.    Family History: family history includes COPD in his father; Cancer in his paternal grandmother; Diabetes in his brother, father, and paternal grandmother; Heart Attack in his sister; Heart Disease in his father; High Blood Pressure in his father and mother; Prostate Cancer in his paternal grandfather.     The patient’s home medications have been reviewed.    Allergies: Morphine and

## 2024-04-12 NOTE — ED NOTES
Patient left with an IV in arm, called patient and asked him to come back so the iv could be removed, and patient said he just wanted to get home and then hung up notified charge RN

## 2024-04-15 ENCOUNTER — OFFICE VISIT (OUTPATIENT)
Dept: FAMILY MEDICINE CLINIC | Age: 49
End: 2024-04-15
Payer: COMMERCIAL

## 2024-04-15 VITALS
BODY MASS INDEX: 29.09 KG/M2 | TEMPERATURE: 97.9 F | HEIGHT: 69 IN | SYSTOLIC BLOOD PRESSURE: 106 MMHG | HEART RATE: 95 BPM | OXYGEN SATURATION: 98 % | DIASTOLIC BLOOD PRESSURE: 75 MMHG | RESPIRATION RATE: 18 BRPM | WEIGHT: 196.4 LBS

## 2024-04-15 DIAGNOSIS — N20.0 RIGHT NEPHROLITHIASIS: Primary | ICD-10-CM

## 2024-04-15 DIAGNOSIS — N13.5 URETEROVESICAL JUNCTION (UVJ) OBSTRUCTION: ICD-10-CM

## 2024-04-15 PROCEDURE — 3017F COLORECTAL CA SCREEN DOC REV: CPT

## 2024-04-15 PROCEDURE — 99213 OFFICE O/P EST LOW 20 MIN: CPT

## 2024-04-15 PROCEDURE — 1036F TOBACCO NON-USER: CPT

## 2024-04-15 PROCEDURE — G8417 CALC BMI ABV UP PARAM F/U: HCPCS

## 2024-04-15 PROCEDURE — 3074F SYST BP LT 130 MM HG: CPT

## 2024-04-15 PROCEDURE — 3078F DIAST BP <80 MM HG: CPT

## 2024-04-15 PROCEDURE — G8427 DOCREV CUR MEDS BY ELIG CLIN: HCPCS

## 2024-04-15 RX ORDER — PROMETHAZINE HYDROCHLORIDE 25 MG/1
25 TABLET ORAL EVERY 6 HOURS PRN
COMMUNITY
Start: 2024-04-08

## 2024-04-15 ASSESSMENT — COLUMBIA-SUICIDE SEVERITY RATING SCALE - C-SSRS
2. HAVE YOU ACTUALLY HAD ANY THOUGHTS OF KILLING YOURSELF?: NO
1. WITHIN THE PAST MONTH, HAVE YOU WISHED YOU WERE DEAD OR WISHED YOU COULD GO TO SLEEP AND NOT WAKE UP?: NO
6. HAVE YOU EVER DONE ANYTHING, STARTED TO DO ANYTHING, OR PREPARED TO DO ANYTHING TO END YOUR LIFE?: NO

## 2024-04-15 ASSESSMENT — PATIENT HEALTH QUESTIONNAIRE - PHQ9
SUM OF ALL RESPONSES TO PHQ QUESTIONS 1-9: 21
10. IF YOU CHECKED OFF ANY PROBLEMS, HOW DIFFICULT HAVE THESE PROBLEMS MADE IT FOR YOU TO DO YOUR WORK, TAKE CARE OF THINGS AT HOME, OR GET ALONG WITH OTHER PEOPLE: NOT DIFFICULT AT ALL
9. THOUGHTS THAT YOU WOULD BE BETTER OFF DEAD, OR OF HURTING YOURSELF: NOT AT ALL
SUM OF ALL RESPONSES TO PHQ QUESTIONS 1-9: 21
SUM OF ALL RESPONSES TO PHQ QUESTIONS 1-9: 21
1. LITTLE INTEREST OR PLEASURE IN DOING THINGS: MORE THAN HALF THE DAYS
7. TROUBLE CONCENTRATING ON THINGS, SUCH AS READING THE NEWSPAPER OR WATCHING TELEVISION: NEARLY EVERY DAY
SUM OF ALL RESPONSES TO PHQ9 QUESTIONS 1 & 2: 5
6. FEELING BAD ABOUT YOURSELF - OR THAT YOU ARE A FAILURE OR HAVE LET YOURSELF OR YOUR FAMILY DOWN: MORE THAN HALF THE DAYS
4. FEELING TIRED OR HAVING LITTLE ENERGY: NEARLY EVERY DAY
8. MOVING OR SPEAKING SO SLOWLY THAT OTHER PEOPLE COULD HAVE NOTICED. OR THE OPPOSITE, BEING SO FIGETY OR RESTLESS THAT YOU HAVE BEEN MOVING AROUND A LOT MORE THAN USUAL: NEARLY EVERY DAY
2. FEELING DOWN, DEPRESSED OR HOPELESS: NEARLY EVERY DAY
5. POOR APPETITE OR OVEREATING: MORE THAN HALF THE DAYS
3. TROUBLE FALLING OR STAYING ASLEEP: NEARLY EVERY DAY
SUM OF ALL RESPONSES TO PHQ QUESTIONS 1-9: 21

## 2024-04-15 NOTE — PROGRESS NOTES
[facility]  Department of Family Medicine  Family Medicine Residency Program      Patient: Shahbaz Montgomery  1975 49 y.o. male     Date of Service: 4/16/2024      Chief complaint:   Chief Complaint   Patient presents with    ED Follow-up     4/6 & 4/12     Nephrolithiasis     Following with urology, no follow up scheduled       HISTORY OF PRESENTING ILLNESS     Shahbaz Mongtomery is a 49 y.o. male presented to the clinic with complaints of  right flank pain    4/6/24 went to the ED for intermittent right sided abdominal pain and was found to have nephrolithiasis, obstruction of right urteropelvic junction. At that time he was provided a follow up with urology, treated with ketorlac 10mg Q8h, tamsulosin, zofran and lidocaine patch.    He was also seen in the ED for the similar complaints 4/12/2024 - patient educated about not leaving the ED before work up is complete.   CT scan results reviewed:   1.  4 mm calculus in the distal right ureter at the UVJ with mild right  hydronephrosis and hydroureter and more significant right perinephric edema.  No additional urinary tract calculi are noted.     2.  Minimal diverticulosis without evidence of diverticulitis and otherwise  nonspecific bowel gas pattern.    Patient states he is still having lots of pain, in the right flank and radiating to the front. He has not been able to pass the stone and has seen blood in his urine.   For his Pain control :  Not on the suboxone   Taking the toradol   Smoking lots of weed -   PDMP reviewed patient has been receiving different strains of weed.   Has been taking over the counter Alieve to help     Patient works at [employer]  He expresses concern that he is going to loose his job, but he worried because he can't physically do the job for 8 hours. He is able to sit, but  Any sort of moving , Sheets of plastic - pulling or pushing pulls on his side , causes increased amounts of pain. When the pain is very

## 2024-04-15 NOTE — PROGRESS NOTES
[facility]  Precepting Note    Subjective:  Right flank pain- nephrolithiasis  Plan for stent per Urology  Blood in urine  Taking Toradol- helping with symptoms    ROS otherwise negative     Past medical, surgical, family and social history were reviewed, non-contributory, and unchanged unless otherwise stated.    Objective:    /75   Pulse 95   Temp 97.9 °F (36.6 °C) (Temporal)   Resp 18   Ht 1.753 m (5' 9\")   Wt 89.1 kg (196 lb 6.4 oz)   SpO2 98%   BMI 29.00 kg/m²     Exam is as noted by resident with the following changes, additions or corrections:    General:  NAD; alert & oriented x 3   Heart:  RRR, no murmurs, gallops, or rubs.  Lungs:  CTA bilaterally, no wheeze, rales or rhonchi  Abd: bowel sounds present, nontender, nondistended, no masses  Extrem:  No clubbing, cyanosis, or edema    Assessment/Plan:  Nephrolithiasis: follow with Urology, upcoming stent  Work restriction for 2 months     Attending Physician Statement  I have reviewed the chart, including any radiology or labs. I have discussed the case, including pertinent history and exam findings with the resident.  I agree with the assessment, plan and orders as documented by the resident.  Please refer to the resident note for additional information.      Electronically signed by SCARLETT CALLAWAY MD on 4/15/2024 at 3:07 PM

## 2024-04-17 ENCOUNTER — TELEPHONE (OUTPATIENT)
Dept: FAMILY MEDICINE CLINIC | Age: 49
End: 2024-04-17

## 2024-04-17 NOTE — TELEPHONE ENCOUNTER
Reached out to patient to discuss STD paperwork left in the office on 4/16/2024; Detailed message left for patient explaining that a urology follow up and surgery timeline is needed for us to complete forms. Asked that he call me back with follow up and/or surgery dates ASAP

## 2024-04-24 ENCOUNTER — OFFICE VISIT (OUTPATIENT)
Dept: FAMILY MEDICINE CLINIC | Age: 49
End: 2024-04-24
Payer: COMMERCIAL

## 2024-04-24 VITALS
RESPIRATION RATE: 16 BRPM | WEIGHT: 201.5 LBS | OXYGEN SATURATION: 98 % | SYSTOLIC BLOOD PRESSURE: 127 MMHG | BODY MASS INDEX: 29.84 KG/M2 | DIASTOLIC BLOOD PRESSURE: 85 MMHG | HEART RATE: 93 BPM | TEMPERATURE: 98.9 F | HEIGHT: 69 IN

## 2024-04-24 DIAGNOSIS — N13.5 URETEROVESICAL JUNCTION (UVJ) OBSTRUCTION: ICD-10-CM

## 2024-04-24 DIAGNOSIS — N20.0 RIGHT NEPHROLITHIASIS: Primary | ICD-10-CM

## 2024-04-24 PROCEDURE — 1036F TOBACCO NON-USER: CPT

## 2024-04-24 PROCEDURE — 3074F SYST BP LT 130 MM HG: CPT

## 2024-04-24 PROCEDURE — G8417 CALC BMI ABV UP PARAM F/U: HCPCS

## 2024-04-24 PROCEDURE — 3017F COLORECTAL CA SCREEN DOC REV: CPT

## 2024-04-24 PROCEDURE — G8427 DOCREV CUR MEDS BY ELIG CLIN: HCPCS

## 2024-04-24 PROCEDURE — 3079F DIAST BP 80-89 MM HG: CPT

## 2024-04-24 PROCEDURE — 99213 OFFICE O/P EST LOW 20 MIN: CPT

## 2024-04-24 NOTE — PROGRESS NOTES
[facility]  Department of Family Medicine  Family Medicine Residency Program      Patient: Shahbaz Montgomery  1975 49 y.o. male     Date of Service: 4/24/2024      Chief complaint:   Chief Complaint   Patient presents with    Nephrolithiasis     Procedure scheduled for 4/26 Dr. Charles    Forms     STD requested for time off work       HISTORY OF PRESENTING ILLNESS     Shahbaz Montgomery is a 49 y.o. male presented to the clinic with complaints of  right flank pain and FMLA till May 15 ,2024    4/6/24 went to the ED for intermittent right sided abdominal pain and was found to have nephrolithiasis, obstruction of right urteropelvic junction.     CT scan results reviewed:   1.  4 mm calculus in the distal right ureter at the UVJ with mild right  hydronephrosis and hydroureter and more significant right perinephric edema.  No additional urinary tract calculi are noted.   2.  Minimal diverticulosis without evidence of diverticulitis and otherwise  nonspecific bowel gas pattern.    Patient states he is still having lots of pain, in the right flank and radiating to the front. He has not been able to pass the stone and has seen blood in his urine.  He has an appointment with Dr. Allison for stent placement on Friday 4/26/24, however patient states he has not called them yet to explain the work limitation with the stents in place       Patient works at [employer]  He thinks after his stent placement recovers he will be able to do 2 hours of work at a time and 10-20ibs. Currently he does not have the option of doing any light duty. It is normally 8 hours and he has worked too hard to where he is now.   He gets pain with pulling, pushing, lifting and is unable to do his work         Past Medical History:      Diagnosis Date    Chronic back pain 8/2/2011    Eczema     Essential hypertension      Past Surgical History:        Procedure Laterality Date    ANKLE SURGERY  2009    ORIF on Rt ankle

## 2024-04-24 NOTE — PROGRESS NOTES
Follow up of nephrolithiasis  Had to take time off work  Was seen by the urologist and scheduled for stent.  Examination.  Blood pressure 127/85, pulse 93, temperature 98.9 °F (37.2 °C), temperature source Temporal, resp. rate 16, height 1.753 m (5' 9\"), weight 91.4 kg (201 lb 8 oz), SpO2 98 %.   Unremarkable exam.  A/P  Ureteral stone  FMLA papers   For stenting as per urologist.  Attending Physician Statement  I have discussed the case, including pertinent history and exam findings with the resident. I agree with the documented assessment and plan.

## 2024-05-15 DIAGNOSIS — L40.9 PSORIASIS: ICD-10-CM

## 2024-05-15 DIAGNOSIS — Z76.0 MEDICATION REFILL: ICD-10-CM

## 2024-05-16 RX ORDER — CLOBETASOL PROPIONATE 0.5 MG/ML
LOTION TOPICAL
Qty: 118 ML | Refills: 2 | Status: SHIPPED | OUTPATIENT
Start: 2024-05-16

## 2024-06-17 ENCOUNTER — OFFICE VISIT (OUTPATIENT)
Dept: FAMILY MEDICINE CLINIC | Age: 49
End: 2024-06-17
Payer: COMMERCIAL

## 2024-06-17 VITALS
HEART RATE: 84 BPM | WEIGHT: 192.68 LBS | DIASTOLIC BLOOD PRESSURE: 84 MMHG | HEIGHT: 69 IN | RESPIRATION RATE: 18 BRPM | OXYGEN SATURATION: 94 % | BODY MASS INDEX: 28.54 KG/M2 | TEMPERATURE: 97.7 F | SYSTOLIC BLOOD PRESSURE: 125 MMHG

## 2024-06-17 DIAGNOSIS — L03.115 CELLULITIS OF RIGHT LOWER LIMB: Primary | ICD-10-CM

## 2024-06-17 LAB
BASOPHILS ABSOLUTE: 0.04 K/UL (ref 0–0.2)
BASOPHILS RELATIVE PERCENT: 1 % (ref 0–2)
EOSINOPHILS ABSOLUTE: 0.11 K/UL (ref 0.05–0.5)
EOSINOPHILS RELATIVE PERCENT: 2 % (ref 0–6)
HCT VFR BLD CALC: 42 % (ref 37–54)
HEMOGLOBIN: 13.4 G/DL (ref 12.5–16.5)
IMMATURE GRANULOCYTES %: 0 % (ref 0–5)
IMMATURE GRANULOCYTES ABSOLUTE: <0.03 K/UL (ref 0–0.58)
LYMPHOCYTES ABSOLUTE: 2.14 K/UL (ref 1.5–4)
LYMPHOCYTES RELATIVE PERCENT: 30 % (ref 20–42)
MCH RBC QN AUTO: 31.8 PG (ref 26–35)
MCHC RBC AUTO-ENTMCNC: 31.9 G/DL (ref 32–34.5)
MCV RBC AUTO: 99.5 FL (ref 80–99.9)
MONOCYTES ABSOLUTE: 0.69 K/UL (ref 0.1–0.95)
MONOCYTES RELATIVE PERCENT: 10 % (ref 2–12)
NEUTROPHILS ABSOLUTE: 4.12 K/UL (ref 1.8–7.3)
NEUTROPHILS RELATIVE PERCENT: 58 % (ref 43–80)
PDW BLD-RTO: 13.2 % (ref 11.5–15)
PLATELET # BLD: 290 K/UL (ref 130–450)
PMV BLD AUTO: 10.2 FL (ref 7–12)
RBC # BLD: 4.22 M/UL (ref 3.8–5.8)
SED RATE, AUTOMATED: 5 MM/HR (ref 0–15)
TOTAL CK: 140 U/L (ref 20–200)
WBC # BLD: 7.1 K/UL (ref 4.5–11.5)

## 2024-06-17 PROCEDURE — 3079F DIAST BP 80-89 MM HG: CPT

## 2024-06-17 PROCEDURE — 1036F TOBACCO NON-USER: CPT

## 2024-06-17 PROCEDURE — G8427 DOCREV CUR MEDS BY ELIG CLIN: HCPCS

## 2024-06-17 PROCEDURE — G8417 CALC BMI ABV UP PARAM F/U: HCPCS

## 2024-06-17 PROCEDURE — 99213 OFFICE O/P EST LOW 20 MIN: CPT

## 2024-06-17 PROCEDURE — 3017F COLORECTAL CA SCREEN DOC REV: CPT

## 2024-06-17 PROCEDURE — 3074F SYST BP LT 130 MM HG: CPT

## 2024-06-17 RX ORDER — DOXYCYCLINE HYCLATE 100 MG
100 TABLET ORAL 2 TIMES DAILY
Qty: 20 TABLET | Refills: 0 | Status: SHIPPED | OUTPATIENT
Start: 2024-06-17 | End: 2024-06-27

## 2024-06-17 RX ORDER — DICLOXACILLIN SODIUM 250 MG/1
250 CAPSULE ORAL 4 TIMES DAILY
Qty: 40 CAPSULE | Refills: 0 | Status: CANCELLED | OUTPATIENT
Start: 2024-06-17 | End: 2024-06-27

## 2024-06-17 NOTE — PROGRESS NOTES
[facility]  Pre[facility]    Subjective:  R lower extremity swelling  Worse with activity  Remote hx of ortho trauma and instrumentation  Was treated for a dog bite 1 month ago- noting fevers swelling, chills      ROS otherwise negative     Past medical, surgical, family and social history were reviewed, non-contributory, and unchanged unless otherwise stated.    Objective:    /84   Pulse 84   Temp 97.7 °F (36.5 °C) (Temporal)   Resp 18   Ht 1.753 m (5' 9\")   Wt 87.4 kg (192 lb 10.9 oz)   SpO2 94%   BMI 28.45 kg/m²     Exam is as noted by resident with the following changes, additions or corrections:    General:  NAD; alert & oriented x 3   Extrem: erythema and TTP over anterior aspect of distal R LE     Assessment/Plan:  Cellulitis   Incompletely treated    PCN allergy   Check CBC, CRP ESR   Doxy x 10 days      Attending Physician Statement  I have reviewed the chart, including any radiology or labs. I have discussed the case, including pertinent history and exam findings with the resident.  I agree with the assessment, plan and orders as documented by the resident.  Please refer to the resident note for additional information.      Electronically signed by Rivas Infante MD on 6/17/2024 at 11:54 AM  
meals) for 7 days, Disp: 14 tablet, Rfl: 0    tazarotene (TAZORAC) 0.05 % cream, Apply topically nightly. (Patient not taking: Reported on 4/24/2024), Disp: 60 g, Rfl: 0    buprenorphine-naloxone (SUBOXONE) 8-2 MG FILM SL film, dissolve 2 FILMS under the tongue once daily (Patient not taking: Reported on 2/20/2024), Disp: , Rfl:     aspirin 81 MG EC tablet, Take 1 tablet by mouth daily (Patient not taking: Reported on 6/17/2024), Disp: , Rfl:     Assessment/Plan:   Diagnosis Orders   1. Cellulitis of right lower limb  doxycycline hyclate (VIBRA-TABS) 100 MG tablet    CBC with Auto Differential    Sedimentation Rate    CK     Doxycycline for cellulitis 2/2 dog bite.   Has PCN allergy so avoiding Augmentin  Initially on Bactrim from , could have been under-treated  CBC, ESR, CK  RTC for close follow up with infection  IF worsens, may require IV abx  Return to work as tolerated     Counseled regarding above diagnosis, including possible risks and complications,  especially if left uncontrolled. Counseled regarding the possible side effects, risks, benefits and alternatives to treatment;patient and/or guardian verbalizes understanding, agrees, feels comfortable with and wishes to proceed with above treatment plan.    Call or go to ED immediately if symptoms worsen or persist. Advised patient to call with any new medication issues, and, as applicable, read all Rx info from pharmacy to assure aware of all possible risks and side effects of medicationbefore taking.    Patient and/or guardian given opportunity to ask questions/raise concerns.  The patient verbalized comfort and understanding ofinstructions.    Reviewed age and gender appropriate health screening exams and vaccinations.  Advised patient regarding importance of keeping up with recommended health maintenance and to schedule as soon as possible if overdue, as this is important in assessing for undiagnosed pathology, especially cancer, as well as protecting

## 2024-06-18 ENCOUNTER — TELEPHONE (OUTPATIENT)
Dept: FAMILY MEDICINE CLINIC | Age: 49
End: 2024-06-18

## 2024-06-18 NOTE — TELEPHONE ENCOUNTER
Last Appointment   6/17/2024  Next Appointment  6/20/2024    Patient stopped in the office today stating that his return to work on light duty is not accepted by his employer. He is asking that you release him back to full duty with no restrictions as he cannot afford to miss any work. He is to call the office with his employers fax number to fax when complete. Thanks!       Employer does not have a fax number, but he called with an email:    [email or web address]    He states that he needs this sent to her today.

## 2024-06-19 DIAGNOSIS — M54.6 CHRONIC BILATERAL THORACIC BACK PAIN: ICD-10-CM

## 2024-06-19 DIAGNOSIS — Z76.0 MEDICATION REFILL: ICD-10-CM

## 2024-06-19 DIAGNOSIS — G89.29 CHRONIC BILATERAL THORACIC BACK PAIN: ICD-10-CM

## 2024-06-19 RX ORDER — LIDOCAINE 50 MG/G
1 PATCH TOPICAL DAILY
Qty: 10 PATCH | Refills: 0 | OUTPATIENT
Start: 2024-06-19 | End: 2024-06-29

## 2024-06-19 RX ORDER — GABAPENTIN 800 MG/1
800 TABLET ORAL 3 TIMES DAILY
Qty: 270 TABLET | Refills: 0 | OUTPATIENT
Start: 2024-06-19 | End: 2024-09-17

## 2024-06-19 ASSESSMENT — ENCOUNTER SYMPTOMS
RHINORRHEA: 0
VOMITING: 0
WHEEZING: 0
SORE THROAT: 0
EYE PAIN: 0
SHORTNESS OF BREATH: 0
NAUSEA: 0
DIARRHEA: 0
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
## 2024-06-20 ENCOUNTER — TELEPHONE (OUTPATIENT)
Dept: FAMILY MEDICINE CLINIC | Age: 49
End: 2024-06-20

## 2024-06-20 DIAGNOSIS — M54.6 CHRONIC BILATERAL THORACIC BACK PAIN: ICD-10-CM

## 2024-06-20 DIAGNOSIS — G89.29 CHRONIC BILATERAL THORACIC BACK PAIN: ICD-10-CM

## 2024-06-20 DIAGNOSIS — Z76.0 MEDICATION REFILL: ICD-10-CM

## 2024-06-20 RX ORDER — LIDOCAINE 50 MG/G
1 PATCH TOPICAL DAILY
Qty: 30 PATCH | Refills: 0 | Status: SHIPPED | OUTPATIENT
Start: 2024-06-20 | End: 2024-07-20

## 2024-06-20 RX ORDER — GABAPENTIN 800 MG/1
800 TABLET ORAL 3 TIMES DAILY
Qty: 270 TABLET | Refills: 0 | Status: SHIPPED | OUTPATIENT
Start: 2024-06-20 | End: 2024-09-18
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
## 2024-06-30 DIAGNOSIS — L40.9 PSORIASIS: ICD-10-CM

## 2024-06-30 DIAGNOSIS — Z76.0 MEDICATION REFILL: ICD-10-CM

## 2024-07-01 RX ORDER — CLOBETASOL PROPIONATE 0.5 MG/G
AEROSOL, FOAM TOPICAL
Qty: 100 G | Refills: 5 | Status: SHIPPED | OUTPATIENT
Start: 2024-07-01

## 2024-07-01 NOTE — TELEPHONE ENCOUNTER
Last Appointment:  4/24/2024  Future Appointments   Date Time Provider Department Center   7/2/2024  2:45 PM Margaret Martinez MD [facility]

## 2024-08-01 DIAGNOSIS — Z76.0 MEDICATION REFILL: ICD-10-CM

## 2024-08-01 DIAGNOSIS — L40.9 PSORIASIS: ICD-10-CM

## 2024-08-01 RX ORDER — CLOBETASOL PROPIONATE 0.5 MG/ML
LOTION TOPICAL
Qty: 118 ML | Refills: 2 | Status: SHIPPED | OUTPATIENT
Start: 2024-08-01

## 2024-08-20 ENCOUNTER — OFFICE VISIT (OUTPATIENT)
Dept: FAMILY MEDICINE CLINIC | Age: 49
End: 2024-08-20
Payer: COMMERCIAL

## 2024-08-20 ENCOUNTER — HOSPITAL ENCOUNTER (OUTPATIENT)
Dept: CT IMAGING | Age: 49
Discharge: HOME | End: 2024-08-22
Payer: COMMERCIAL

## 2024-08-20 ENCOUNTER — HOSPITAL ENCOUNTER (OUTPATIENT)
Age: 49
End: 2024-08-20
Payer: COMMERCIAL

## 2024-08-20 VITALS
WEIGHT: 194.45 LBS | HEIGHT: 69 IN | DIASTOLIC BLOOD PRESSURE: 88 MMHG | OXYGEN SATURATION: 98 % | HEART RATE: 71 BPM | BODY MASS INDEX: 28.8 KG/M2 | SYSTOLIC BLOOD PRESSURE: 137 MMHG | TEMPERATURE: 98.5 F | RESPIRATION RATE: 18 BRPM

## 2024-08-20 DIAGNOSIS — Z12.11 COLON CANCER SCREENING: ICD-10-CM

## 2024-08-20 DIAGNOSIS — R10.9 FLANK PAIN: ICD-10-CM

## 2024-08-20 DIAGNOSIS — R10.9 FLANK PAIN: Primary | ICD-10-CM

## 2024-08-20 PROCEDURE — G8417 CALC BMI ABV UP PARAM F/U: HCPCS

## 2024-08-20 PROCEDURE — 3079F DIAST BP 80-89 MM HG: CPT

## 2024-08-20 PROCEDURE — 3017F COLORECTAL CA SCREEN DOC REV: CPT

## 2024-08-20 PROCEDURE — 99213 OFFICE O/P EST LOW 20 MIN: CPT

## 2024-08-20 PROCEDURE — 74176 CT ABD & PELVIS W/O CONTRAST: CPT

## 2024-08-20 PROCEDURE — 3075F SYST BP GE 130 - 139MM HG: CPT

## 2024-08-20 PROCEDURE — G8427 DOCREV CUR MEDS BY ELIG CLIN: HCPCS

## 2024-08-20 PROCEDURE — 1036F TOBACCO NON-USER: CPT

## 2024-08-20 RX ORDER — CLOBETASOL PROPIONATE 0.5 MG/G
1 EMULSION TOPICAL 2 TIMES DAILY
COMMUNITY
Start: 2024-04-24

## 2024-08-20 SDOH — ECONOMIC STABILITY: FOOD INSECURITY: WITHIN THE PAST 12 MONTHS, THE FOOD YOU BOUGHT JUST DIDN'T LAST AND YOU DIDN'T HAVE MONEY TO GET MORE.: NEVER TRUE

## 2024-08-20 SDOH — ECONOMIC STABILITY: FOOD INSECURITY: WITHIN THE PAST 12 MONTHS, YOU WORRIED THAT YOUR FOOD WOULD RUN OUT BEFORE YOU GOT MONEY TO BUY MORE.: NEVER TRUE

## 2024-08-20 SDOH — ECONOMIC STABILITY: INCOME INSECURITY: HOW HARD IS IT FOR YOU TO PAY FOR THE VERY BASICS LIKE FOOD, HOUSING, MEDICAL CARE, AND HEATING?: NOT HARD AT ALL

## 2024-08-20 NOTE — PROGRESS NOTES
S: 49 y.o. male with   Chief Complaint   Patient presents with    Abdominal Pain     Right abdominal pain. 10/10 pain at worst, can't lay on that side. Has taken OTC analgesics with limited effect.    Health Maintenance     Patient interested in getting labs and Cologuard,       RLQ & RUQ abd pains started days ago, constant and worsening. Has hx kidney stone.     O: VS:  height is 1.753 m (5' 9\") and weight is 88.2 kg (194 lb 7.1 oz). His temperature is 98.5 °F (36.9 °C). His blood pressure is 137/88 and his pulse is 71. His respiration is 18 and oxygen saturation is 98%.   BP Readings from Last 3 Encounters:   08/20/24 137/88   06/17/24 125/84   04/24/24 127/85     See resident note  Ttp RUQ more lateral than the GB would be expected.      Impression/Plan:   1) Flank pain - CT non-con - if negative tx as MSK          Health Maintenance Due   Topic Date Due    Hepatitis B vaccine (1 of 3 - 19+ 3-dose series) Never done    DTaP/Tdap/Td vaccine (1 - Tdap) Never done    Colorectal Cancer Screen  Never done    COVID-19 Vaccine (4 - 2023-24 season) 09/01/2023    Lipids  01/31/2024    Shingles vaccine (1 of 2) Never done    Flu vaccine (1) 08/01/2024         Attending Physician Statement  I have discussed the case, including pertinent history and exam findings with the resident. I also have seen the patient and performed key portions of the examination.  I agree with the documented assessment and plan.      Sanya Fields MD  
General: No swelling or tenderness. Normal range of motion.      Cervical back: Normal range of motion and neck supple.   Skin:     Findings: Rash (Eczema on the abdomen) present.   Neurological:      Mental Status: He is alert and oriented to person, place, and time. Mental status is at baseline.           ASSESSMENT AND PLAN     1. Flank pain  The patient's medical history and physical examination findings raise concerns for having an episode of nephrolithiasis.  - CT ABDOMEN PELVIS WO CONTRAST; Future    2. Colon cancer screening  The patient was informed about different options for colon cancer screening, he prefers to do Cologuard.  - Cologuard (Fecal DNA Colorectal Cancer Screening)    Counseled regarding above diagnosis, including possible risks and complications, especially if left uncontrolled. Counseled regarding the possible side effects, risks, benefits and alternatives to treatment; patient and/or guardian verbalizes understanding, agrees, feels comfortable with and wishes to proceed with above treatment plan.    Call or go to ED immediately if symptoms worsen or persist. Advised patient to call with any new medication issues, and, as applicable, read all Rx info from pharmacy to assure aware of all possible risks and side effects of medication before taking.     Patient and/or guardian given opportunity to ask questions/raise concerns.The patient verbalized comfort and understanding of instructions.    I encourage further reading and education about your health conditions.Information on many health conditions is provided by the American Academy of Family Physicians: https://familydoctor.org/  Please bring any questions to me at your next visit.    Medication List:    Current Outpatient Medications   Medication Sig Dispense Refill    clobetasol prop emollient base (CLOBETASOL PROPIONATE E) 0.05 % CREA Apply 1 each topically 2 times daily      clobetasol propionate 0.05 % LOTN lotion APPLY TOPICALLY TO

## 2024-08-27 ENCOUNTER — TELEPHONE (OUTPATIENT)
Dept: FAMILY MEDICINE CLINIC | Age: 49
End: 2024-08-27

## 2024-08-27 DIAGNOSIS — R10.9 ABDOMINAL PAIN, UNSPECIFIED ABDOMINAL LOCATION: Primary | ICD-10-CM

## 2024-08-27 NOTE — TELEPHONE ENCOUNTER
Per Dr Fields;   If patient is still having pain. He is to get the CT done.     LVM for patient to call the office, update us on his symptoms and see if he needs additional help scheduling the CT

## 2024-08-27 NOTE — TELEPHONE ENCOUNTER
Pt called this morning and is requesting a new order for the CT scan of ABD Pelvis WO contrast.     This test was ordered STAT for patient at his acute visit on 8/20 but the patient did not go to the STAT appt the office arranged for him. Please order new Routine CT scan for pt & call patient once order has been placed so that he can call the scheduling number to have the imaging performed. Pt was given the scheduling phone number at the time of original phone call. STAT CT order is no longer good after 24 hours, a new CT will need ordered.

## 2024-08-30 NOTE — TELEPHONE ENCOUNTER
When patient originally called in 3 days ago asking for a new CT order, he stated he was still having the pain he had in the office- he just wasn't able to go to the STAT that was scheduled because he got called into work.

## 2024-08-30 NOTE — TELEPHONE ENCOUNTER
I have tried calling the patient to inquire about his pain level. However, he didn't respond. Could you try to call him again and ask about his pain to reassess the need for a CT? Thank you

## 2024-09-11 DIAGNOSIS — L40.9 PSORIASIS: ICD-10-CM

## 2024-09-11 DIAGNOSIS — Z76.0 MEDICATION REFILL: ICD-10-CM

## 2024-09-13 RX ORDER — CLOBETASOL PROPIONATE 0.5 MG/ML
LOTION TOPICAL
Qty: 118 ML | Refills: 2 | Status: SHIPPED | OUTPATIENT
Start: 2024-09-13

## 2024-09-13 RX ORDER — CLOBETASOL PROPIONATE 0.5 MG/G
AEROSOL, FOAM TOPICAL
Qty: 100 G | Refills: 5 | Status: SHIPPED | OUTPATIENT
Start: 2024-09-13

## 2024-09-16 ENCOUNTER — OFFICE VISIT (OUTPATIENT)
Dept: FAMILY MEDICINE CLINIC | Age: 49
End: 2024-09-16
Payer: COMMERCIAL

## 2024-09-16 VITALS
OXYGEN SATURATION: 98 % | HEIGHT: 69 IN | HEART RATE: 88 BPM | WEIGHT: 194.4 LBS | RESPIRATION RATE: 20 BRPM | TEMPERATURE: 98.9 F | DIASTOLIC BLOOD PRESSURE: 73 MMHG | SYSTOLIC BLOOD PRESSURE: 109 MMHG | BODY MASS INDEX: 28.79 KG/M2

## 2024-09-16 DIAGNOSIS — R10.11 RUQ PAIN: Primary | ICD-10-CM

## 2024-09-16 LAB
ALBUMIN: 3.9 G/DL (ref 3.5–5.2)
ALP BLD-CCNC: 85 U/L (ref 40–129)
ALT SERPL-CCNC: 11 U/L (ref 0–40)
ANION GAP SERPL CALCULATED.3IONS-SCNC: 11 MMOL/L (ref 7–16)
AST SERPL-CCNC: 18 U/L (ref 0–39)
BASOPHILS ABSOLUTE: 0.04 K/UL (ref 0–0.2)
BASOPHILS RELATIVE PERCENT: 0 % (ref 0–2)
BILIRUB SERPL-MCNC: 0.2 MG/DL (ref 0–1.2)
BILIRUBIN, POC: NORMAL
BLOOD URINE, POC: NORMAL
BUN BLDV-MCNC: 16 MG/DL (ref 6–20)
CALCIUM SERPL-MCNC: 9.1 MG/DL (ref 8.6–10.2)
CHLORIDE BLD-SCNC: 103 MMOL/L (ref 98–107)
CLARITY, POC: CLEAR
CO2: 25 MMOL/L (ref 22–29)
COLOR, POC: YELLOW
CREAT SERPL-MCNC: 0.7 MG/DL (ref 0.7–1.2)
EOSINOPHILS ABSOLUTE: 0.15 K/UL (ref 0.05–0.5)
EOSINOPHILS RELATIVE PERCENT: 2 % (ref 0–6)
GFR, ESTIMATED: >90 ML/MIN/1.73M2
GLUCOSE BLD-MCNC: 86 MG/DL (ref 74–99)
GLUCOSE URINE, POC: NORMAL MG/DL
HCT VFR BLD CALC: 42.6 % (ref 37–54)
HEMOGLOBIN: 13.4 G/DL (ref 12.5–16.5)
IMMATURE GRANULOCYTES %: 0 % (ref 0–5)
IMMATURE GRANULOCYTES ABSOLUTE: <0.03 K/UL (ref 0–0.58)
KETONES, POC: NORMAL MG/DL
LEUKOCYTE EST, POC: NORMAL
LYMPHOCYTES ABSOLUTE: 2.56 K/UL (ref 1.5–4)
LYMPHOCYTES RELATIVE PERCENT: 29 % (ref 20–42)
MCH RBC QN AUTO: 31.1 PG (ref 26–35)
MCHC RBC AUTO-ENTMCNC: 31.5 G/DL (ref 32–34.5)
MCV RBC AUTO: 98.8 FL (ref 80–99.9)
MONOCYTES ABSOLUTE: 0.73 K/UL (ref 0.1–0.95)
MONOCYTES RELATIVE PERCENT: 8 % (ref 2–12)
NEUTROPHILS ABSOLUTE: 5.48 K/UL (ref 1.8–7.3)
NEUTROPHILS RELATIVE PERCENT: 61 % (ref 43–80)
NITRITE, POC: NORMAL
PDW BLD-RTO: 13.1 % (ref 11.5–15)
PH, POC: 7
PLATELET # BLD: 333 K/UL (ref 130–450)
PMV BLD AUTO: 10.3 FL (ref 7–12)
POTASSIUM SERPL-SCNC: 4.3 MMOL/L (ref 3.5–5)
PROTEIN, POC: NORMAL MG/DL
RBC # BLD: 4.31 M/UL (ref 3.8–5.8)
SODIUM BLD-SCNC: 139 MMOL/L (ref 132–146)
SPECIFIC GRAVITY, POC: 1.02
TOTAL PROTEIN: 6.6 G/DL (ref 6.4–8.3)
UROBILINOGEN, POC: 0.2 MG/DL
WBC # BLD: 9 K/UL (ref 4.5–11.5)

## 2024-09-16 PROCEDURE — G8417 CALC BMI ABV UP PARAM F/U: HCPCS

## 2024-09-16 PROCEDURE — 81002 URINALYSIS NONAUTO W/O SCOPE: CPT

## 2024-09-16 PROCEDURE — G8427 DOCREV CUR MEDS BY ELIG CLIN: HCPCS

## 2024-09-16 PROCEDURE — 3078F DIAST BP <80 MM HG: CPT

## 2024-09-16 PROCEDURE — 3074F SYST BP LT 130 MM HG: CPT

## 2024-09-16 PROCEDURE — 1036F TOBACCO NON-USER: CPT

## 2024-09-16 PROCEDURE — 3017F COLORECTAL CA SCREEN DOC REV: CPT

## 2024-09-16 PROCEDURE — 99213 OFFICE O/P EST LOW 20 MIN: CPT

## 2024-09-16 ASSESSMENT — ENCOUNTER SYMPTOMS
VOMITING: 0
WHEEZING: 0
NAUSEA: 0
SHORTNESS OF BREATH: 0
ABDOMINAL PAIN: 0
CONSTIPATION: 0
DIARRHEA: 0
SORE THROAT: 0

## 2024-09-17 ENCOUNTER — TELEPHONE (OUTPATIENT)
Dept: FAMILY MEDICINE CLINIC | Age: 49
End: 2024-09-17

## 2024-09-17 ENCOUNTER — HOSPITAL ENCOUNTER (OUTPATIENT)
Dept: ULTRASOUND IMAGING | Age: 49
Discharge: HOME | End: 2024-09-19
Payer: COMMERCIAL

## 2024-09-17 DIAGNOSIS — R10.11 RUQ PAIN: ICD-10-CM

## 2024-09-17 PROCEDURE — 76705 ECHO EXAM OF ABDOMEN: CPT

## 2024-09-18 DIAGNOSIS — M54.6 CHRONIC BILATERAL THORACIC BACK PAIN: ICD-10-CM

## 2024-09-18 DIAGNOSIS — G89.29 CHRONIC BILATERAL THORACIC BACK PAIN: ICD-10-CM

## 2024-09-18 DIAGNOSIS — Z76.0 MEDICATION REFILL: ICD-10-CM

## 2024-09-18 LAB — NONINV COLON CA DNA+OCC BLD SCRN STL QL: NEGATIVE

## 2024-09-19 RX ORDER — GABAPENTIN 800 MG/1
800 TABLET ORAL 3 TIMES DAILY
Qty: 270 TABLET | Refills: 0 | Status: SHIPPED | OUTPATIENT
Start: 2024-09-19 | End: 2024-12-18

## 2024-09-27 ENCOUNTER — HOSPITAL ENCOUNTER (EMERGENCY)
Age: 49
Discharge: LEFT BEFORE DISCHARGE | End: 2024-09-27
Attending: EMERGENCY MEDICINE
Payer: COMMERCIAL

## 2024-09-27 ENCOUNTER — APPOINTMENT (OUTPATIENT)
Dept: CT IMAGING | Age: 49
End: 2024-09-27
Payer: COMMERCIAL

## 2024-09-27 VITALS
HEIGHT: 69 IN | TEMPERATURE: 99 F | RESPIRATION RATE: 16 BRPM | DIASTOLIC BLOOD PRESSURE: 95 MMHG | SYSTOLIC BLOOD PRESSURE: 134 MMHG | OXYGEN SATURATION: 97 % | BODY MASS INDEX: 26.66 KG/M2 | WEIGHT: 180 LBS | HEART RATE: 93 BPM

## 2024-09-27 DIAGNOSIS — R10.9 ABDOMINAL PAIN, UNSPECIFIED ABDOMINAL LOCATION: ICD-10-CM

## 2024-09-27 DIAGNOSIS — Z53.21 ELOPED FROM EMERGENCY DEPARTMENT: Primary | ICD-10-CM

## 2024-09-27 LAB
ALBUMIN SERPL-MCNC: 4.2 G/DL (ref 3.5–5.2)
ALP SERPL-CCNC: 89 U/L (ref 40–129)
ALT SERPL-CCNC: 20 U/L (ref 0–40)
ANION GAP SERPL CALCULATED.3IONS-SCNC: 9 MMOL/L (ref 7–16)
AST SERPL-CCNC: 21 U/L (ref 0–39)
BASOPHILS # BLD: 0.03 K/UL (ref 0–0.2)
BASOPHILS NFR BLD: 0 % (ref 0–2)
BILIRUB SERPL-MCNC: 0.4 MG/DL (ref 0–1.2)
BILIRUB UR QL STRIP: NEGATIVE
BUN SERPL-MCNC: 11 MG/DL (ref 6–20)
CALCIUM SERPL-MCNC: 9.5 MG/DL (ref 8.6–10.2)
CHLORIDE SERPL-SCNC: 104 MMOL/L (ref 98–107)
CLARITY UR: CLEAR
CO2 SERPL-SCNC: 26 MMOL/L (ref 22–29)
COLOR UR: YELLOW
CREAT SERPL-MCNC: 0.7 MG/DL (ref 0.7–1.2)
EOSINOPHIL # BLD: 0.01 K/UL (ref 0.05–0.5)
EOSINOPHILS RELATIVE PERCENT: 0 % (ref 0–6)
ERYTHROCYTE [DISTWIDTH] IN BLOOD BY AUTOMATED COUNT: 13.1 % (ref 11.5–15)
GFR, ESTIMATED: >90 ML/MIN/1.73M2
GLUCOSE SERPL-MCNC: 101 MG/DL (ref 74–99)
GLUCOSE UR STRIP-MCNC: NEGATIVE MG/DL
HCT VFR BLD AUTO: 42 % (ref 37–54)
HGB BLD-MCNC: 14.4 G/DL (ref 12.5–16.5)
HGB UR QL STRIP.AUTO: NEGATIVE
IMM GRANULOCYTES # BLD AUTO: <0.03 K/UL (ref 0–0.58)
IMM GRANULOCYTES NFR BLD: 0 % (ref 0–5)
KETONES UR STRIP-MCNC: NEGATIVE MG/DL
LACTATE BLDV-SCNC: 0.8 MMOL/L (ref 0.5–2.2)
LEUKOCYTE ESTERASE UR QL STRIP: NEGATIVE
LIPASE SERPL-CCNC: 74 U/L (ref 13–60)
LYMPHOCYTES NFR BLD: 2.31 K/UL (ref 1.5–4)
LYMPHOCYTES RELATIVE PERCENT: 26 % (ref 20–42)
MCH RBC QN AUTO: 31.1 PG (ref 26–35)
MCHC RBC AUTO-ENTMCNC: 34.3 G/DL (ref 32–34.5)
MCV RBC AUTO: 90.7 FL (ref 80–99.9)
MONOCYTES NFR BLD: 0.59 K/UL (ref 0.1–0.95)
MONOCYTES NFR BLD: 7 % (ref 2–12)
NEUTROPHILS NFR BLD: 67 % (ref 43–80)
NEUTS SEG NFR BLD: 5.86 K/UL (ref 1.8–7.3)
NITRITE UR QL STRIP: NEGATIVE
PH UR STRIP: 7 [PH] (ref 5–9)
PLATELET # BLD AUTO: 347 K/UL (ref 130–450)
PMV BLD AUTO: 8.9 FL (ref 7–12)
POTASSIUM SERPL-SCNC: 3.7 MMOL/L (ref 3.5–5)
PROT SERPL-MCNC: 7.3 G/DL (ref 6.4–8.3)
PROT UR STRIP-MCNC: NEGATIVE MG/DL
RBC # BLD AUTO: 4.63 M/UL (ref 3.8–5.8)
RBC #/AREA URNS HPF: NORMAL /HPF
SODIUM SERPL-SCNC: 139 MMOL/L (ref 132–146)
SP GR UR STRIP: 1.02 (ref 1–1.03)
UROBILINOGEN UR STRIP-ACNC: 1 EU/DL (ref 0–1)
WBC #/AREA URNS HPF: NORMAL /HPF
WBC OTHER # BLD: 8.8 K/UL (ref 4.5–11.5)

## 2024-09-27 PROCEDURE — 99283 EMERGENCY DEPT VISIT LOW MDM: CPT

## 2024-09-27 PROCEDURE — 83690 ASSAY OF LIPASE: CPT

## 2024-09-27 PROCEDURE — 83605 ASSAY OF LACTIC ACID: CPT

## 2024-09-27 PROCEDURE — 81001 URINALYSIS AUTO W/SCOPE: CPT

## 2024-09-27 PROCEDURE — 80053 COMPREHEN METABOLIC PANEL: CPT

## 2024-09-27 PROCEDURE — 85025 COMPLETE CBC W/AUTO DIFF WBC: CPT

## 2024-09-27 RX ORDER — KETOROLAC TROMETHAMINE 30 MG/ML
30 INJECTION, SOLUTION INTRAMUSCULAR; INTRAVENOUS ONCE
Status: DISCONTINUED | OUTPATIENT
Start: 2024-09-27 | End: 2024-09-27 | Stop reason: HOSPADM

## 2024-09-27 ASSESSMENT — PAIN DESCRIPTION - LOCATION: LOCATION: ABDOMEN

## 2024-09-27 ASSESSMENT — PAIN DESCRIPTION - FREQUENCY: FREQUENCY: CONTINUOUS

## 2024-09-27 ASSESSMENT — PAIN SCALES - GENERAL: PAINLEVEL_OUTOF10: 10

## 2024-09-27 ASSESSMENT — PAIN DESCRIPTION - ONSET: ONSET: ON-GOING

## 2024-09-27 ASSESSMENT — PAIN - FUNCTIONAL ASSESSMENT: PAIN_FUNCTIONAL_ASSESSMENT: 0-10

## 2024-09-27 ASSESSMENT — PAIN DESCRIPTION - PAIN TYPE: TYPE: ACUTE PAIN;CHRONIC PAIN

## 2024-09-27 ASSESSMENT — PAIN DESCRIPTION - ORIENTATION: ORIENTATION: RIGHT;UPPER

## 2024-09-27 ASSESSMENT — PAIN DESCRIPTION - DESCRIPTORS: DESCRIPTORS: DISCOMFORT

## 2024-09-27 NOTE — ED NOTES
Patient came up to RN and stated that he wanted to leave. I spoke to him at length about the benefits of staying for the CT scan. Pt is agreeable to stay for further testing at this time.

## 2024-09-27 NOTE — ED PROVIDER NOTES
[facility] EMERGENCY DEPARTMENT  EMERGENCY DEPARTMENT ENCOUNTER      Pt Name: Shahbaz Montgomery  MRN: [id number]  Birthdate 1975  Date of evaluation: 9/27/2024  Provider: Garrett Lawson DO  PCP: Flavio Sim MD  Note Started: 11:00 AM EDT 9/27/24    CHIEF COMPLAINT       Chief Complaint   Patient presents with    Abdominal Pain     RUQ pain for months.  gallstones       HISTORY OF PRESENT ILLNESS: 1 or more Elements   History From: Patient  Limitations to history : None    Shahbaz Montgomery is a 49 y.o. male who presents to the ED for evaluation of abdominal pain.  Patient states for the past 6 months or so he has been having pain in his right mid area and right upper quadrant.  Patient states that this seems to be worse than it has been in the past.  Patient states that he did have an ultrasound fairly recently which was unremarkable.  Patient is he has been told that he has gallstones in the past.  Patient has no other reported mitigating or exacerbating factors.    Nursing Notes were all reviewed and agreed with or any disagreements were addressed in the HPI.    REVIEW OF SYSTEMS :    Positives and Pertinent negatives as per HPI.     SURGICAL HISTORY     Past Surgical History:   Procedure Laterality Date    ANKLE SURGERY  2009    ORIF on Rt ankle       CURRENTMEDICATIONS       Discharge Medication List as of 9/27/2024 11:49 AM        CONTINUE these medications which have NOT CHANGED    Details   gabapentin (NEURONTIN) 800 MG tablet Take 1 tablet by mouth 3 times daily for 90 days., Disp-270 tablet, R-0Normal      clobetasol (OLUX) 0.05 % foam apply topically twice a day, Disp-100 g, R-5, Normal      clobetasol propionate 0.05 % LOTN lotion apply topically to affected area twice a day, Disp-118 mL, R-2Normal      clobetasol prop emollient base (CLOBETASOL PROPIONATE E) 0.05 % CREA Apply 1 each topically 2 times dailyHistorical Med      tamsulosin (FLOMAX) 0.4 MG capsule  other components within normal limits   LIPASE - Abnormal; Notable for the following components:    Lipase 74 (*)     All other components within normal limits   LACTIC ACID   URINALYSIS WITH MICROSCOPIC       As interpreted by me, the above displayed labs are abnormal. All other labs obtained during this visit were within normal range or not returned as of this dictation.      Interpretation per the Radiologist below, if available at the time of this note:    No orders to display     No results found.    No results found.    PROCEDURES   Unless otherwise noted below, none     CRITICAL CARE TIME (.cct)       PAST MEDICAL HISTORY/Chronic Conditions Affecting Care    has a past medical history of Chronic back pain (8/2/2011), Eczema, and Essential hypertension.     EMERGENCY DEPARTMENT COURSE    Vitals:    Vitals:    09/27/24 1004 09/27/24 1005   BP:  (!) 134/95   Pulse: 93    Resp: 16    Temp: 99 °F (37.2 °C)    SpO2: 97%    Weight: 81.6 kg (180 lb)    Height: 1.753 m (5' 9\")        Patient was given the following medications:  Medications - No data to display            Medical Decision Making/Differential Diagnosis:    CC/HPI Summary, Social Determinants of health, Records Reviewed, DDx, testing done/not done, ED Course, Reassessment, disposition considerations/shared decision making with patient, consults, disposition:            Chronic Conditions:   Past Medical History:   Diagnosis Date    Chronic back pain 8/2/2011    Eczema     Essential hypertension        CONSULTS: (Who and What was discussed)  None    Discussion with Other Profesionals : None    Social Determinants : Patient has significant healthcare illiteracy    Records Reviewed : Source Reviewed Most Recent [place]        Disposition Considerations (Tests not ordered but considered, Shared Decision Making, Pt Expectation of Test or Tx.): This is a 49-year-old male who presented to the ED for several months of right sided abdominal pain.  Patient stated this

## 2024-09-27 NOTE — ED NOTES
Pt came back up to RN and stated that he does not wish to stay any longer. ED withdrawal of care form signed. IV removed. Physician updated.

## 2024-09-30 ENCOUNTER — OFFICE VISIT (OUTPATIENT)
Dept: FAMILY MEDICINE CLINIC | Age: 49
End: 2024-09-30

## 2024-09-30 VITALS
HEIGHT: 69 IN | BODY MASS INDEX: 28.29 KG/M2 | HEART RATE: 97 BPM | WEIGHT: 191 LBS | TEMPERATURE: 97.4 F | OXYGEN SATURATION: 97 % | SYSTOLIC BLOOD PRESSURE: 113 MMHG | RESPIRATION RATE: 16 BRPM | DIASTOLIC BLOOD PRESSURE: 69 MMHG

## 2024-09-30 DIAGNOSIS — Z71.85 VACCINE COUNSELING: ICD-10-CM

## 2024-09-30 DIAGNOSIS — Z76.0 MEDICATION REFILL: ICD-10-CM

## 2024-09-30 DIAGNOSIS — M54.50 ACUTE RIGHT-SIDED LOW BACK PAIN WITHOUT SCIATICA: ICD-10-CM

## 2024-09-30 DIAGNOSIS — L40.9 PSORIASIS: ICD-10-CM

## 2024-09-30 DIAGNOSIS — I25.2 HISTORY OF MI (MYOCARDIAL INFARCTION): ICD-10-CM

## 2024-09-30 DIAGNOSIS — M54.6 CHRONIC BILATERAL THORACIC BACK PAIN: ICD-10-CM

## 2024-09-30 DIAGNOSIS — K21.9 GASTROESOPHAGEAL REFLUX DISEASE, UNSPECIFIED WHETHER ESOPHAGITIS PRESENT: ICD-10-CM

## 2024-09-30 DIAGNOSIS — R11.2 NAUSEA AND VOMITING, UNSPECIFIED VOMITING TYPE: ICD-10-CM

## 2024-09-30 DIAGNOSIS — R10.84 GENERALIZED ABDOMINAL PAIN: Primary | ICD-10-CM

## 2024-09-30 DIAGNOSIS — I10 ESSENTIAL HYPERTENSION: ICD-10-CM

## 2024-09-30 DIAGNOSIS — G89.29 CHRONIC BILATERAL THORACIC BACK PAIN: ICD-10-CM

## 2024-09-30 RX ORDER — SILDENAFIL 50 MG/1
50 TABLET, FILM COATED ORAL DAILY PRN
Qty: 30 TABLET | Refills: 2 | Status: ON HOLD | OUTPATIENT
Start: 2024-09-30

## 2024-09-30 RX ORDER — GABAPENTIN 800 MG/1
800 TABLET ORAL 3 TIMES DAILY
Qty: 90 TABLET | Refills: 3 | Status: ON HOLD | OUTPATIENT
Start: 2024-09-30 | End: 2025-01-28

## 2024-09-30 RX ORDER — HYDROCHLOROTHIAZIDE 50 MG/1
50 TABLET ORAL DAILY
Qty: 90 TABLET | Refills: 2 | Status: ON HOLD | OUTPATIENT
Start: 2024-09-30

## 2024-09-30 RX ORDER — LISINOPRIL 40 MG/1
40 TABLET ORAL DAILY
Qty: 90 TABLET | Refills: 2 | Status: ON HOLD | OUTPATIENT
Start: 2024-09-30

## 2024-09-30 RX ORDER — CLOBETASOL PROPIONATE 0.5 MG/G
AEROSOL, FOAM TOPICAL
Qty: 100 G | Refills: 2 | Status: ON HOLD | OUTPATIENT
Start: 2024-09-30

## 2024-09-30 RX ORDER — SUCRALFATE 1 G/1
1 TABLET ORAL 3 TIMES DAILY
Qty: 120 TABLET | Refills: 2 | Status: ON HOLD | OUTPATIENT
Start: 2024-09-30

## 2024-09-30 RX ORDER — SUCRALFATE 1 G/1
1 TABLET ORAL 3 TIMES DAILY
Qty: 120 TABLET | Refills: 3 | Status: SHIPPED
Start: 2024-09-30 | End: 2024-09-30

## 2024-09-30 RX ORDER — ATORVASTATIN CALCIUM 10 MG/1
10 TABLET, FILM COATED ORAL DAILY
Qty: 90 TABLET | Refills: 2 | Status: ON HOLD | OUTPATIENT
Start: 2024-09-30

## 2024-09-30 RX ORDER — ONDANSETRON 4 MG/1
4 TABLET, FILM COATED ORAL EVERY 8 HOURS PRN
Qty: 10 TABLET | Refills: 2 | Status: ON HOLD | OUTPATIENT
Start: 2024-09-30

## 2024-09-30 RX ORDER — PANTOPRAZOLE SODIUM 40 MG/1
40 TABLET, DELAYED RELEASE ORAL
Qty: 30 TABLET | Refills: 3 | Status: ON HOLD | OUTPATIENT
Start: 2024-09-30 | End: 2025-01-28

## 2024-09-30 RX ORDER — CLOBETASOL PROPIONATE 0.5 MG/ML
LOTION TOPICAL
Qty: 118 ML | Refills: 2 | Status: ON HOLD | OUTPATIENT
Start: 2024-09-30

## 2024-09-30 ASSESSMENT — ENCOUNTER SYMPTOMS
BACK PAIN: 1
VOMITING: 1
WHEEZING: 0
BLOOD IN STOOL: 0
NAUSEA: 1
APNEA: 0
ABDOMINAL DISTENTION: 0
CONSTIPATION: 0
CHEST TIGHTNESS: 0
SHORTNESS OF BREATH: 0
COUGH: 0
ABDOMINAL PAIN: 1
CHOKING: 0
DIARRHEA: 0

## 2024-09-30 NOTE — PROGRESS NOTES
S: 49 y.o. male with   Chief Complaint   Patient presents with    ED Follow-up    Abdominal Pain     Pt is here bc of chronic abd pain since July.  He has a hx of kidney stones.  He has lost 13 lbs in the week because of the pain.      O: VS:  height is 1.753 m (5' 9.02\") and weight is 86.6 kg (191 lb). His temporal temperature is 97.4 °F (36.3 °C). His blood pressure is 113/69 and his pulse is 97. His respiration is 16 and oxygen saturation is 97%.   BP Readings from Last 3 Encounters:   09/30/24 113/69   09/27/24 (!) 134/95   09/16/24 109/73     See resident note    Impression/Plan:   1) abd pain - pt with severe abd pain affecting his work.  FMLA form filled out. CT scan abd ordered today.  Has a hx of renal stone.  Will also send to  and start carafate.   2) prev - vaccines today.      Health Maintenance Due   Topic Date Due    Hepatitis B vaccine (1 of 3 - 19+ 3-dose series) Never done    DTaP/Tdap/Td vaccine (1 - Tdap) Never done    Lipids  01/31/2024    Shingles vaccine (1 of 2) Never done    COVID-19 Vaccine (4 - 2023-24 season) 09/01/2024         Attending Physician Statement  I have discussed the case, including pertinent history and exam findings with the resident. I also have seen the patient and performed key portions of the examination.  I agree with the documented assessment and plan.      Eli Macias MD  
Take 1 tablet by mouth in the morning, at noon, and at bedtime  Dispense: 120 tablet; Refill: 2    3. History of MI (myocardial infarction)  - atorvastatin (LIPITOR) 10 MG tablet; Take 1 tablet by mouth daily  Dispense: 90 tablet; Refill: 2    4. Medication refill  - sildenafil (VIAGRA) 50 MG tablet; Take 1 tablet by mouth daily as needed for Erectile Dysfunction  Dispense: 30 tablet; Refill: 2    5. Psoriasis  - clobetasol (OLUX) 0.05 % foam; apply topically twice a day  Dispense: 100 g; Refill: 2  - clobetasol propionate 0.05 % LOTN lotion; apply topically to affected area twice a day  Dispense: 118 mL; Refill: 2    6. Acute right-sided low back pain without sciatica  - diclofenac sodium (VOLTAREN) 1 % GEL; Apply 2 g topically 4 times daily  Dispense: 50 g; Refill: 2    7. Chronic bilateral thoracic back pain  - gabapentin (NEURONTIN) 800 MG tablet; Take 1 tablet by mouth 3 times daily for 120 days.  Dispense: 90 tablet; Refill: 3    8. Essential hypertension  Well-controlled within normal range continue to monitor.  - hydroCHLOROthiazide (HYDRODIURIL) 50 MG tablet; Take 1 tablet by mouth daily  Dispense: 90 tablet; Refill: 2  - lisinopril (PRINIVIL;ZESTRIL) 40 MG tablet; Take 1 tablet by mouth daily  Dispense: 90 tablet; Refill: 2    9. Vaccine counseling  Patient requested vaccine  - Hep B, ENGERIX-B, (age 20 yrs+), IM, 1mL, 3-dose  - Tdap, BOOSTRIX, (age 10 yrs+), IM    10. Nausea and vomiting, unspecified vomiting type  Episodes of nausea and vomiting associated with abdominal pain.  Continue to monitor.  - ondansetron (ZOFRAN) 4 MG tablet; Take 1 tablet by mouth every 8 hours as needed for Nausea  Dispense: 10 tablet; Refill: 2        Return to Office: Return in about 3 months (around 12/30/2024), or if symptoms worsen or fail to improve.      I encourage further reading and education about your health conditions.  Information on many healthconditions is provided by the American Academy of Family Physicians:

## 2024-10-01 ENCOUNTER — OFFICE VISIT (OUTPATIENT)
Dept: SURGERY | Age: 49
End: 2024-10-01
Payer: COMMERCIAL

## 2024-10-01 VITALS
WEIGHT: 194 LBS | HEIGHT: 69 IN | BODY MASS INDEX: 28.73 KG/M2 | DIASTOLIC BLOOD PRESSURE: 63 MMHG | TEMPERATURE: 97.8 F | OXYGEN SATURATION: 97 % | HEART RATE: 82 BPM | SYSTOLIC BLOOD PRESSURE: 107 MMHG

## 2024-10-01 DIAGNOSIS — Z80.0 FAMILY HISTORY OF COLON CANCER: ICD-10-CM

## 2024-10-01 DIAGNOSIS — R10.11 RUQ PAIN: Primary | ICD-10-CM

## 2024-10-01 PROCEDURE — 3017F COLORECTAL CA SCREEN DOC REV: CPT | Performed by: SURGERY

## 2024-10-01 PROCEDURE — 99204 OFFICE O/P NEW MOD 45 MIN: CPT | Performed by: SURGERY

## 2024-10-01 PROCEDURE — 3078F DIAST BP <80 MM HG: CPT | Performed by: SURGERY

## 2024-10-01 PROCEDURE — G8484 FLU IMMUNIZE NO ADMIN: HCPCS | Performed by: SURGERY

## 2024-10-01 PROCEDURE — G8427 DOCREV CUR MEDS BY ELIG CLIN: HCPCS | Performed by: SURGERY

## 2024-10-01 PROCEDURE — G8417 CALC BMI ABV UP PARAM F/U: HCPCS | Performed by: SURGERY

## 2024-10-01 PROCEDURE — 3074F SYST BP LT 130 MM HG: CPT | Performed by: SURGERY

## 2024-10-01 PROCEDURE — 1036F TOBACCO NON-USER: CPT | Performed by: SURGERY

## 2024-10-01 NOTE — PROGRESS NOTES
(8/20/2024)    Overall Financial Resource Strain (CARDIA)     Difficulty of Paying Living Expenses: Not hard at all   Food Insecurity: No Food Insecurity (8/20/2024)    Hunger Vital Sign     Worried About Running Out of Food in the Last Year: Never true     Ran Out of Food in the Last Year: Never true   Transportation Needs: Unknown (8/20/2024)    PRAPARE - Transportation     Lack of Transportation (Non-Medical): No   Housing Stability: Unknown (8/20/2024)    Housing Stability Vital Sign     Homeless in the Last Year: No       Family History   Problem Relation Age of Onset    High Blood Pressure Mother     Diabetes Father     Heart Disease Father     High Blood Pressure Father     COPD Father     Heart Attack Sister     Diabetes Brother     Diabetes Paternal Grandmother     Cancer Paternal Grandmother         Unknown    Prostate Cancer Paternal Grandfather        Review of Systems   All other systems reviewed and are negative.              Objective:  Vitals:    10/01/24 1529   BP: 107/63   Pulse: 82   Temp: 97.8 °F (36.6 °C)   SpO2: 97%   Weight: 88 kg (194 lb)   Height: 1.753 m (5' 9\")        Body mass index is 28.65 kg/m².      Physical Exam  Constitutional:       General: He is not in acute distress.     Appearance: He is not diaphoretic.   HENT:      Head: Normocephalic and atraumatic.   Eyes:      General:         Right eye: No discharge.         Left eye: No discharge.   Neck:      Trachea: No tracheal deviation.   Cardiovascular:      Rate and Rhythm: Normal rate.   Pulmonary:      Effort: Pulmonary effort is normal. No respiratory distress.   Abdominal:      General: There is no distension.      Palpations: Abdomen is soft.      Tenderness: There is no guarding or rebound.   Skin:     General: Skin is warm and dry.   Neurological:      Mental Status: He is alert and oriented to person, place, and time.         CBC:   Lab Results   Component Value Date/Time    WBC 8.8 09/27/2024 10:57 AM    RBC 4.63

## 2024-10-02 ENCOUNTER — TELEPHONE (OUTPATIENT)
Dept: SURGERY | Age: 49
End: 2024-10-02

## 2024-10-02 ENCOUNTER — PREP FOR PROCEDURE (OUTPATIENT)
Dept: SURGERY | Age: 49
End: 2024-10-02

## 2024-10-02 DIAGNOSIS — R10.11 RUQ PAIN: ICD-10-CM

## 2024-10-02 NOTE — TELEPHONE ENCOUNTER
Scheduled patient for hida scan 10/14/24 at 8:00 with an arrival time of 7:45 SEY. Patient is to be NPO for 6 hours prior, no medications, water or pain medication for 4 hours prior. Patient advised and verbalized understanding. Faxed kinevac order to [place] [facility]. Electronically signed by Maru Gold MA on 10/2/2024 at 3:50 PM

## 2024-10-02 NOTE — TELEPHONE ENCOUNTER
Prior Authorization Form:      DEMOGRAPHICS:                     Patient Name:  Shahbaz Montgomery  Patient :  1975            Insurance:  Payor: [employer] MIL[employer] / Plan: [employer] MILIND / Product Type: *No Product type* /   Insurance ID Number:    Payer/Plan Subscr  Sex Relation Sub. Ins. ID Effective Group Num   1. [employer]ATH[employer]INE [employer]* SHAHBAZ MONTGOMERY* 1975 Male Self [id number]                                   [address] [address]         DIAGNOSIS & PROCEDURE:                       Procedure/Operation: EGD           CPT Code: 60409    Diagnosis:  RUQ pain    ICD10 Code: R10.11    Location[facility]  [facility]    Surgeon:  Dr Pereyra    SCHEDULING INFORMATION:                          Date: 10-04-24    Time: 9:45 am              Anesthesia:  LMAC                                                       Status:  Outpatient        Special Comments:  Urgent        Electronically signed by Camila Alvarez MA on 10/2/2024 at 12:55 PM

## 2024-10-03 RX ORDER — OXYCODONE AND ACETAMINOPHEN 7.5; 325 MG/1; MG/1
1 TABLET ORAL EVERY 4 HOURS PRN
COMMUNITY

## 2024-10-03 NOTE — PROGRESS NOTES
[facility] PRE-ADMISSION TESTING INSTRUCTIONS    The Preadmission Testing patient is instructed accordingly using the following criteria (check applicable):    ARRIVAL INSTRUCTIONS:  [x] Parking the day of Surgery is located in the Main Entrance lot.  Upon entering the door, make an immediate right to the surgery reception desk    [x] Bring photo ID and insurance card    [] Bring in a copy of Living will or Durable Power of  papers.    [x] Please be sure to arrange for responsible adult to provide transportation to and from the hospital    [x] Please arrange for responsible adult to be with you for the 24 hour period post procedure due to having anesthesia    [x] If you awake am of surgery not feeling well or have temperature >100 please call [phone]    GENERAL INSTRUCTIONS:    [x] May have clear liquids until 4 hours prior to surgery. Examples include water, fruit juices (no pulp), jello, popsicles, black coffee or tea, beef or chicken broth.               No gum, candy or mints.    [x] You may brush your teeth, but do not swallow any water    [x] Take medications as instructed with 1-2 oz of water    [] Stop herbal supplements and vitamins 5 days prior to procedure    [] Follow preop dosing of blood thinners per physician instructions    [] Take 1/2 dose of evening insulin, but no insulin after midnight    [] No oral diabetic medications after midnight    [] If diabetic and have low blood sugar or feel symptomatic, take 1-2oz apple juice only    [] Bring inhalers day of surgery    [] Bring C-PAP/ Bi-Pap day of surgery    [] Bring urine specimen day of surgery    [x] Shower or bath with soap, lather and rinse well, AM of Surgery, no lotion, powders or creams to surgical site    [] Follow bowel prep as instructed per surgeon    [x] No tobacco products within 24 hours of surgery     [x] No alcohol or illegal drug use within 24 hours of surgery.    [x] Jewelry, body piercing's,

## 2024-10-04 ENCOUNTER — TELEPHONE (OUTPATIENT)
Dept: SURGERY | Age: 49
End: 2024-10-04

## 2024-10-04 NOTE — PROGRESS NOTES
Patient states that he last ate this am at aprox 5-530  Patient also admits to taking non prescribed narcotics   Procedure cancelled at this time  Patient educated on pre procedure protocol and will call office to reschedule

## 2024-10-04 NOTE — TELEPHONE ENCOUNTER
THAI ATE A COOKIE PRIOR TO HE EGD - HIS WAS CANCELED FOR 10-4-2024 - HE IS RESCHEDULED [id number] - PT NOTIFIED OF DATE TIME AN INSTRUCTIONS AND REMINDED NOT TO EAR.

## 2024-10-08 ENCOUNTER — ANESTHESIA (OUTPATIENT)
Dept: ENDOSCOPY | Age: 49
End: 2024-10-08
Payer: COMMERCIAL

## 2024-10-08 ENCOUNTER — HOSPITAL ENCOUNTER (OUTPATIENT)
Age: 49
Setting detail: OUTPATIENT SURGERY
Discharge: HOME | End: 2024-10-08
Attending: SURGERY | Admitting: SURGERY
Payer: COMMERCIAL

## 2024-10-08 ENCOUNTER — ANESTHESIA EVENT (OUTPATIENT)
Dept: ENDOSCOPY | Age: 49
End: 2024-10-08
Payer: COMMERCIAL

## 2024-10-08 VITALS
SYSTOLIC BLOOD PRESSURE: 118 MMHG | BODY MASS INDEX: 28.14 KG/M2 | DIASTOLIC BLOOD PRESSURE: 71 MMHG | OXYGEN SATURATION: 98 % | WEIGHT: 190 LBS | RESPIRATION RATE: 15 BRPM | TEMPERATURE: 97.4 F | HEART RATE: 67 BPM | HEIGHT: 69 IN

## 2024-10-08 DIAGNOSIS — R10.11 RUQ PAIN: ICD-10-CM

## 2024-10-08 PROCEDURE — 3609012400 HC EGD TRANSORAL BIOPSY SINGLE/MULTIPLE: Performed by: SURGERY

## 2024-10-08 PROCEDURE — 88342 IMHCHEM/IMCYTCHM 1ST ANTB: CPT

## 2024-10-08 PROCEDURE — 43239 EGD BIOPSY SINGLE/MULTIPLE: CPT | Performed by: SURGERY

## 2024-10-08 PROCEDURE — 7100000010 HC PHASE II RECOVERY - FIRST 15 MIN: Performed by: SURGERY

## 2024-10-08 PROCEDURE — 2709999900 HC NON-CHARGEABLE SUPPLY: Performed by: SURGERY

## 2024-10-08 PROCEDURE — 88305 TISSUE EXAM BY PATHOLOGIST: CPT

## 2024-10-08 PROCEDURE — 3700000000 HC ANESTHESIA ATTENDED CARE: Performed by: SURGERY

## 2024-10-08 PROCEDURE — 7100000011 HC PHASE II RECOVERY - ADDTL 15 MIN: Performed by: SURGERY

## 2024-10-08 PROCEDURE — 2580000003 HC RX 258: Performed by: NURSE ANESTHETIST, CERTIFIED REGISTERED

## 2024-10-08 PROCEDURE — 6360000002 HC RX W HCPCS: Performed by: NURSE ANESTHETIST, CERTIFIED REGISTERED

## 2024-10-08 RX ORDER — PROPOFOL 10 MG/ML
INJECTION, EMULSION INTRAVENOUS
Status: DISCONTINUED | OUTPATIENT
Start: 2024-10-08 | End: 2024-10-08 | Stop reason: SDUPTHER

## 2024-10-08 RX ORDER — SODIUM CHLORIDE 9 MG/ML
INJECTION, SOLUTION INTRAVENOUS
Status: DISCONTINUED | OUTPATIENT
Start: 2024-10-08 | End: 2024-10-08 | Stop reason: SDUPTHER

## 2024-10-08 RX ADMIN — PROPOFOL 250 MG: 10 INJECTION, EMULSION INTRAVENOUS at 11:19

## 2024-10-08 RX ADMIN — SODIUM CHLORIDE: 9 INJECTION, SOLUTION INTRAVENOUS at 11:18

## 2024-10-08 ASSESSMENT — PAIN - FUNCTIONAL ASSESSMENT: PAIN_FUNCTIONAL_ASSESSMENT: 0-10

## 2024-10-08 ASSESSMENT — PAIN SCALES - GENERAL: PAINLEVEL_OUTOF10: 0

## 2024-10-08 NOTE — H&P
Substance and Sexual Activity    Alcohol use: No       Comment: Quit since 2007    Drug use: Not Currently    Sexual activity: Yes      Social Determinants of Health           Financial Resource Strain: Low Risk  (8/20/2024)     Overall Financial Resource Strain (CARDIA)      Difficulty of Paying Living Expenses: Not hard at all   Food Insecurity: No Food Insecurity (8/20/2024)     Hunger Vital Sign      Worried About Running Out of Food in the Last Year: Never true      Ran Out of Food in the Last Year: Never true   Transportation Needs: Unknown (8/20/2024)     PRAPARE - Transportation      Lack of Transportation (Non-Medical): No   Housing Stability: Unknown (8/20/2024)     Housing Stability Vital Sign      Homeless in the Last Year: No            Family History         Family History   Problem Relation Age of Onset    High Blood Pressure Mother      Diabetes Father      Heart Disease Father      High Blood Pressure Father      COPD Father      Heart Attack Sister      Diabetes Brother      Diabetes Paternal Grandmother      Cancer Paternal Grandmother           Unknown    Prostate Cancer Paternal Grandfather              Review of Systems   All other systems reviewed and are negative.                  Objective:  Vitals       Vitals:     10/01/24 1529   BP: 107/63   Pulse: 82   Temp: 97.8 °F (36.6 °C)   SpO2: 97%   Weight: 88 kg (194 lb)   Height: 1.753 m (5' 9\")            Objective  Body mass index is 28.65 kg/m².        Physical Exam  Constitutional:       General: He is not in acute distress.     Appearance: He is not diaphoretic.   HENT:      Head: Normocephalic and atraumatic.   Eyes:      General:         Right eye: No discharge.         Left eye: No discharge.   Neck:      Trachea: No tracheal deviation.   Cardiovascular:      Rate and Rhythm: Normal rate.   Pulmonary:      Effort: Pulmonary effort is normal. No respiratory distress.   Abdominal:      General: There is no distension.      Palpations:  been transcribed using voice recognition software. Every effort was made to ensure accuracy; however, inadvertent computerized transcription errors may be present. Please excuse any transcriptional grammatical or spelling errors that may have escaped my editorial review.     CC: Flavio Sim MD, Kayleen Flores,*

## 2024-10-08 NOTE — ANESTHESIA PRE PROCEDURE
Department of Anesthesiology  Preprocedure Note       Name:  Shahbaz Montgomery   Age:  49 y.o.  :  1975                                          MRN:  [id number]         Date:  10/8/2024      Surgeon: Surgeon(s):  Denver Pereyra MD    Procedure: Procedure(s):  ESOPHAGOGASTRODUODENOSCOPY    Medications prior to admission:   Prior to Admission medications    Medication Sig Start Date End Date Taking? Authorizing Provider   oxyCODONE-acetaminophen (PERCOCET) 7.5-325 MG per tablet Take 1 tablet by mouth every 4 hours as needed for Pain.   Yes Hermelindo Cleaning MD   pantoprazole (PROTONIX) 40 MG tablet Take 1 tablet by mouth every morning (before breakfast) 24 Yes Kayleen Flores MD   gabapentin (NEURONTIN) 800 MG tablet Take 1 tablet by mouth 3 times daily for 120 days. 24 Yes Kayleen Flores MD   hydroCHLOROthiazide (HYDRODIURIL) 50 MG tablet Take 1 tablet by mouth daily 24  Yes Kayleen Flores MD   lisinopril (PRINIVIL;ZESTRIL) 40 MG tablet Take 1 tablet by mouth daily 24  Yes Kayleen Flores MD   sucralfate (CARAFATE) 1 GM tablet Take 1 tablet by mouth in the morning, at noon, and at bedtime 24  Yes Kayleen Flores MD   aspirin 81 MG EC tablet Take 1 tablet by mouth daily   Yes Hermelindo Cleaning MD   atorvastatin (LIPITOR) 10 MG tablet Take 1 tablet by mouth daily 24   Kayleen Flores MD   clobetasol (OLUX) 0.05 % foam apply topically twice a day 24   Kayleen Flores MD   clobetasol propionate 0.05 % LOTN lotion apply topically to affected area twice a day 24   Kayleen Flores MD   diclofenac sodium (VOLTAREN) 1 % GEL Apply 2 g topically 4 times daily 24   Kayleen Flores MD   ondansetron (ZOFRAN) 4 MG tablet Take 1 tablet by mouth every 8 hours as needed for Nausea  Patient not taking: Reported on 10/3/2024 9/30/24   Kayleen Flores MD   sildenafil (VIAGRA) 50 MG

## 2024-10-08 NOTE — ANESTHESIA POSTPROCEDURE EVALUATION
Department of Anesthesiology  Postprocedure Note    Patient: Shahbaz Montgomery  MRN: [id number]  YOB: 1975  Date of evaluation: 10/8/2024    Procedure Summary       Date: 10/08/24 Room / Location: [place] [address] / [facility]    Anesthesia Start: 1118 Anesthesia Stop: 1130    Procedure: ESOPHAGOGASTRODUODENOSCOPY BIOPSY Diagnosis:       RUQ pain      (RUQ pain [R10.11])    Surgeons: Denver Pereyra MD Responsible Provider: Wendi Marie DO    Anesthesia Type: MAC ASA Status: 2            Anesthesia Type: No value filed.    Nati Phase I: Nati Score: 10    Nati Phase II: Nati Score: 10    Anesthesia Post Evaluation    Patient location during evaluation: PACU  Patient participation: complete - patient participated  Level of consciousness: awake and alert  Airway patency: patent  Nausea & Vomiting: no nausea and no vomiting  Cardiovascular status: hemodynamically stable  Respiratory status: acceptable  Hydration status: euvolemic  Pain management: adequate    No notable events documented.

## 2024-10-11 LAB — SURGICAL PATHOLOGY REPORT: NORMAL

## 2024-10-14 ENCOUNTER — HOSPITAL ENCOUNTER (OUTPATIENT)
Dept: NUCLEAR MEDICINE | Age: 49
Discharge: HOME | End: 2024-10-16
Attending: SURGERY
Payer: COMMERCIAL

## 2024-10-14 VITALS — WEIGHT: 190 LBS | BODY MASS INDEX: 28.06 KG/M2

## 2024-10-14 DIAGNOSIS — R10.11 RUQ PAIN: ICD-10-CM

## 2024-10-14 PROCEDURE — 2580000003 HC RX 258: Performed by: SURGERY

## 2024-10-14 PROCEDURE — 78227 HEPATOBIL SYST IMAGE W/DRUG: CPT

## 2024-10-14 PROCEDURE — A9537 TC99M MEBROFENIN: HCPCS | Performed by: RADIOLOGY

## 2024-10-14 PROCEDURE — 6360000002 HC RX W HCPCS: Performed by: SURGERY

## 2024-10-14 PROCEDURE — 3430000000 HC RX DIAGNOSTIC RADIOPHARMACEUTICAL: Performed by: RADIOLOGY

## 2024-10-14 RX ADMIN — Medication 6.2 MILLICURIE: at 08:10

## 2024-10-14 RX ADMIN — SINCALIDE 1.72 MCG: 5 INJECTION, POWDER, LYOPHILIZED, FOR SOLUTION INTRAVENOUS at 09:22

## 2024-10-15 ENCOUNTER — OFFICE VISIT (OUTPATIENT)
Dept: FAMILY MEDICINE CLINIC | Age: 49
End: 2024-10-15

## 2024-10-15 VITALS
HEART RATE: 80 BPM | RESPIRATION RATE: 18 BRPM | HEIGHT: 69 IN | BODY MASS INDEX: 28.58 KG/M2 | DIASTOLIC BLOOD PRESSURE: 73 MMHG | SYSTOLIC BLOOD PRESSURE: 123 MMHG | TEMPERATURE: 98.3 F | OXYGEN SATURATION: 98 % | WEIGHT: 193 LBS

## 2024-10-15 DIAGNOSIS — R10.11 RIGHT UPPER QUADRANT PAIN: Primary | ICD-10-CM

## 2024-10-15 DIAGNOSIS — L30.9 ECZEMA, UNSPECIFIED TYPE: ICD-10-CM

## 2024-10-15 DIAGNOSIS — L84 CALLUS OF FOOT: ICD-10-CM

## 2024-10-15 ASSESSMENT — ENCOUNTER SYMPTOMS
CONSTIPATION: 0
EYE PAIN: 0
NAUSEA: 0
VOMITING: 0
SORE THROAT: 0
WHEEZING: 0
DIARRHEA: 0
BACK PAIN: 0
RHINORRHEA: 0
SHORTNESS OF BREATH: 0
COUGH: 0

## 2024-10-15 NOTE — PROGRESS NOTES
[facility]  Precepting Note    Subjective:  Abd pain  PMH kidney stones.   Had FMLA signed last month for time off work due to this pain.   July 2023. Deep RUQ pain. Constant 10/10.  Worse after eating a large amount and after standing/over exertion at work. ([employer], overnight shifts)  Has had close contacts with GI cancers. He's conscious of that.  Occ emesis with severe pain.   + weight loss.   Self medicates with 5mg of percocet every couple hours from the street.  + suboxone in jail, says didn't work for him.   Has seen gen surgery for this with recent EGD, noncontributory.   Nuclear med yesterday showed GB HIDA scan - normal result.  FU with gen surg on 10/29.     Needs released from [facility] to return to work (needs to work financially)      Hx eczema and plaque psoraisis  Clobetasol lotion or cream used in past, no longer covered by insurance.       Left foot pain  Worse with standing. Bothers him. Requesting ref to Pod.       ROS otherwise negative     Past medical, surgical, family and social history were reviewed, non-contributory, and unchanged unless otherwise stated.    Objective:    /73   Pulse 80   Temp 98.3 °F (36.8 °C)   Resp 18   Ht 1.753 m (5' 9\")   Wt 87.5 kg (193 lb)   SpO2 98%   BMI 28.50 kg/m²     Exam is as noted by resident with the following changes, additions or corrections:    General:  NAD; alert & oriented x 3   Heart:  RRR, no murmurs, gallops, or rubs.  Lungs:  CTA bilaterally, no wheeze, rales or rhonchi  Abd: bowel sounds present, +ttp RUQ, apprehensive but not guarding,  nondistended, no masses  Extrem:  No clubbing, cyanosis, or edema  Skin: psoriasis left lower shin.   Large callus bottom left foot.    Assessment/Plan:  RUQ abd pain  Ct abd pelv w/ IV contrast from April 2024 wo findings in the area.   Hida normal. GB US.   EGD normal.   Follow-up with general surgery for next steps on 10/29    Psoriasis  Derm ref    Foot 
Stability Vital Sign     Unable to Pay for Housing in the Last Year: Not on file     Number of Times Moved in the Last Year: Not on file     Homeless in the Last Year: No      Family History:       Problem Relation Age of Onset    High Blood Pressure Mother     Diabetes Father     Heart Disease Father     High Blood Pressure Father     COPD Father     Heart Attack Sister     Diabetes Brother     Diabetes Paternal Grandmother     Cancer Paternal Grandmother         Unknown    Prostate Cancer Paternal Grandfather      Review of Systems:   Review of Systems   Constitutional:  Negative for activity change, appetite change, fatigue and fever.   HENT:  Negative for congestion, ear discharge, ear pain, hearing loss, postnasal drip, rhinorrhea, sneezing and sore throat.    Eyes:  Negative for pain.   Respiratory:  Negative for cough, shortness of breath and wheezing.    Cardiovascular:  Negative for chest pain, palpitations and leg swelling.   Gastrointestinal:  Positive for abdominal pain. Negative for constipation, diarrhea, nausea and vomiting.   Genitourinary:  Negative for dysuria and hematuria.   Musculoskeletal:  Negative for back pain, myalgias and neck pain.   Skin:  Negative for rash.   Neurological:  Negative for dizziness, tremors, syncope and headaches.   Psychiatric/Behavioral:  Negative for agitation and sleep disturbance. The patient is not nervous/anxious.        PHYSICAL EXAM   Vitals: /73   Pulse 80   Temp 98.3 °F (36.8 °C)   Resp 18   Ht 1.753 m (5' 9\")   Wt 87.5 kg (193 lb)   SpO2 98%   BMI 28.50 kg/m²   Physical Exam  Vitals reviewed.   Constitutional:       General: He is not in acute distress.  HENT:      Head: Normocephalic and atraumatic.      Right Ear: External ear normal.      Left Ear: External ear normal.      Nose: No congestion or rhinorrhea.      Mouth/Throat:      Pharynx: No posterior oropharyngeal erythema.   Eyes:      Conjunctiva/sclera: Conjunctivae normal.      Pupils:

## 2024-10-17 ASSESSMENT — ENCOUNTER SYMPTOMS: ABDOMINAL PAIN: 1

## 2024-10-29 ENCOUNTER — OFFICE VISIT (OUTPATIENT)
Dept: SURGERY | Age: 49
End: 2024-10-29
Payer: COMMERCIAL

## 2024-10-29 VITALS
RESPIRATION RATE: 18 BRPM | BODY MASS INDEX: 28.76 KG/M2 | WEIGHT: 194.2 LBS | OXYGEN SATURATION: 98 % | SYSTOLIC BLOOD PRESSURE: 133 MMHG | TEMPERATURE: 97.2 F | HEART RATE: 63 BPM | DIASTOLIC BLOOD PRESSURE: 87 MMHG | HEIGHT: 69 IN

## 2024-10-29 DIAGNOSIS — R10.11 RUQ PAIN: ICD-10-CM

## 2024-10-29 DIAGNOSIS — K82.8 DYSFUNCTIONAL GALLBLADDER: Primary | ICD-10-CM

## 2024-10-29 PROCEDURE — 3017F COLORECTAL CA SCREEN DOC REV: CPT | Performed by: SURGERY

## 2024-10-29 PROCEDURE — 3075F SYST BP GE 130 - 139MM HG: CPT | Performed by: SURGERY

## 2024-10-29 PROCEDURE — 99214 OFFICE O/P EST MOD 30 MIN: CPT | Performed by: SURGERY

## 2024-10-29 PROCEDURE — 1036F TOBACCO NON-USER: CPT | Performed by: SURGERY

## 2024-10-29 PROCEDURE — 3079F DIAST BP 80-89 MM HG: CPT | Performed by: SURGERY

## 2024-10-29 PROCEDURE — G8427 DOCREV CUR MEDS BY ELIG CLIN: HCPCS | Performed by: SURGERY

## 2024-10-29 PROCEDURE — G8484 FLU IMMUNIZE NO ADMIN: HCPCS | Performed by: SURGERY

## 2024-10-29 PROCEDURE — G8417 CALC BMI ABV UP PARAM F/U: HCPCS | Performed by: SURGERY

## 2024-10-29 NOTE — PROGRESS NOTES
[facility] General Surgery Clinic Note    Assessment/Plan:     Diagnosis Orders   1. Dysfunctional gallbladder      We will plan for cholecystectomy.  He was counseled all of his symptoms may not improve.      2. RUQ pain      Cholecystectomy as above.  He was counseled to stop marijuana.          Return for Surgery.      Chief Complaint   Patient presents with    Follow-up     EGD follow up, 10-04-24         PCP: Flavio Sim MD    HPI: Shahbaz Montgomery is a 49 y.o. male here for GI follow-up.  He underwent EGD for right upper quadrant pain.  EGD just showed small sliding hiatal hernia and some mild chronic gastritis on pathology.  H. pylori was negative.  Duodenal biopsies were negative for sprue.  He is still having right upper quadrant pain.  Initially this has been since last summer but has worsened over the last couple of months.  He does smoke marijuana.  He had a HIDA scan which per the report shows 93% ejection fraction.    Recent note from PCP reviewed    Review of Systems   All other systems reviewed and are negative.        Past Medical History:   Diagnosis Date    Arthritis     Chronic back pain 08/02/2011    Eczema     Essential hypertension     GERD (gastroesophageal reflux disease)     Hyperlipidemia        Past Surgical History:   Procedure Laterality Date    ANKLE SURGERY Right 01/01/2009    CYSTOSCOPY      UPPER GASTROINTESTINAL ENDOSCOPY N/A 10/8/2024    ESOPHAGOGASTRODUODENOSCOPY BIOPSY performed by Denver Pereyra MD at [facility] ENDOSCOPY       Family History   Problem Relation Age of Onset    High Blood Pressure Mother     Diabetes Father     Heart Disease Father     High Blood Pressure Father     COPD Father     Heart Attack Sister     Diabetes Brother     Diabetes Paternal Grandmother     Cancer Paternal Grandmother         Unknown    Prostate Cancer Paternal Grandfather        Social History     Socioeconomic History    Marital status: Single     Spouse name: Not on file    Number of children:

## 2024-11-19 ENCOUNTER — OFFICE VISIT (OUTPATIENT)
Dept: FAMILY MEDICINE CLINIC | Age: 49
End: 2024-11-19

## 2024-11-19 VITALS
OXYGEN SATURATION: 98 % | SYSTOLIC BLOOD PRESSURE: 130 MMHG | RESPIRATION RATE: 17 BRPM | HEART RATE: 85 BPM | WEIGHT: 192 LBS | BODY MASS INDEX: 28.34 KG/M2 | DIASTOLIC BLOOD PRESSURE: 79 MMHG | TEMPERATURE: 97.6 F

## 2024-11-19 DIAGNOSIS — M54.2 NECK PAIN: ICD-10-CM

## 2024-11-19 DIAGNOSIS — H11.30 SUBCONJUNCTIVAL HEMORRHAGE, UNSPECIFIED LATERALITY: Primary | ICD-10-CM

## 2024-11-19 NOTE — PROGRESS NOTES
[facility]  Precepting Note    Subjective:  Bloodshot eye after straining on toilet.   No eye pain  No vision change.       Posterior neck pain and headaches  On and off 2 weeks  Lasts hours  Not associated with vision change or light sensitivity  No nausea or vomiting.  Used some OTC migraine meds that did not head.  BP ok today.     He has low back pain  Wants xray of low back   GBP 800mg TID, but has been taking four times daily.       ROS otherwise negative     Past medical, surgical, family and social history were reviewed, non-contributory, and unchanged unless otherwise stated.    Objective:    /79   Pulse 85   Temp 97.6 °F (36.4 °C) (Temporal)   Resp 17   Wt 87.1 kg (192 lb)   SpO2 98%   BMI 28.34 kg/m²     Exam is as noted by resident with the following changes, additions or corrections:    General:  NAD; alert & oriented x 3   Heart:  RRR, no murmurs, gallops, or rubs.  Lungs:  CTA bilaterally, no wheeze, rales or rhonchi  Abd: bowel sounds present, nontender, nondistended, no masses  Extrem:  No clubbing, cyanosis, or edema  Left eye subconjunctival hemorrhagic.  Pupils reactive. Ophthalmoscop  Trigger point ttp along paraspinals and into trap. ROM normal. No ttp over spinus processes.  No light sensitivity on exam.         Assessment/Plan:  Subconjunctival hemorrhage  Reassured. No worrisome findings. Call for any vision change, which would not be expected.     Posterior neck and occipital headache, with muscular trigger points  No red flags, BP ok  Advil, tylenol   ROM neck exercises, HEP.      Attending Physician Statement  I have reviewed the chart, including any radiology or labs. I have discussed the case, including pertinent history and exam findings with the resident.  I agree with the assessment, plan and orders as documented by the resident.  Please refer to the resident note for additional information.      Electronically signed by MARINA CRUZ

## 2024-11-19 NOTE — PROGRESS NOTES
[facility]  Family Medicine Residency  Phone: [phone]  Fax: [phone]    Patient:  Shahbaz Montgomery 49 y.o. male                                 Date of Service: 11/19/24                            Chiefcomplaint:   Chief Complaint   Patient presents with    Eye Problem     Left eye, hurting and bloodshot         History of Present Illness:     The patient is a 49 y.o. male  presented to the clinic with complaints as above.    Left eye blood shot after defecation,no eye pain,no blurring of vision    Back of the neck pain and headaches since 2 weeks  Last hours takes tylenol   No photophobia nausea   H/o migraine medications over the counter with no relief  Has back of neck pain associated with headache     Needs to get cholecystectomy waiting for dates  H/o back pain and uses lidocaine patches and gabapentin  Requests Xrays of back And lab work  Takes only gabapentin    Podiatry appointment on 6th     Denies any fever, chills, n/v, headache, dizziness, vision changes, neck tenderness or stiffness, weakness, cp, palpitations, leg swelling/tenderness, sob, cough, abd pain, dysuria, hematuria, diarrhea, constipation, bloody stools.      Review of Systems:   Review of Systems    Past Medical History:      Diagnosis Date    Arthritis     Chronic back pain 08/02/2011    Eczema     Essential hypertension     GERD (gastroesophageal reflux disease)     Hyperlipidemia        Past Surgical History:        Procedure Laterality Date    ANKLE SURGERY Right 01/01/2009    CYSTOSCOPY      UPPER GASTROINTESTINAL ENDOSCOPY N/A 10/8/2024    ESOPHAGOGASTRODUODENOSCOPY BIOPSY performed by Denver Pereyra MD at [facility] ENDOSCOPY       Allergies:    Morphine and Penicillins    Social History:   Social History     Socioeconomic History    Marital status: Single     Spouse name: Not on file    Number of children: Not on file    Years of education: Not on file    Highest education level: Not on file

## 2024-12-02 ENCOUNTER — OFFICE VISIT (OUTPATIENT)
Dept: FAMILY MEDICINE CLINIC | Age: 49
End: 2024-12-02
Payer: COMMERCIAL

## 2024-12-02 VITALS
TEMPERATURE: 98.9 F | WEIGHT: 199.6 LBS | SYSTOLIC BLOOD PRESSURE: 126 MMHG | BODY MASS INDEX: 29.56 KG/M2 | OXYGEN SATURATION: 98 % | DIASTOLIC BLOOD PRESSURE: 84 MMHG | HEART RATE: 88 BPM | HEIGHT: 69 IN | RESPIRATION RATE: 18 BRPM

## 2024-12-02 DIAGNOSIS — R00.2 PALPITATION: ICD-10-CM

## 2024-12-02 DIAGNOSIS — R73.9 HYPERGLYCEMIA: ICD-10-CM

## 2024-12-02 DIAGNOSIS — R53.1 WEAKNESS GENERALIZED: ICD-10-CM

## 2024-12-02 DIAGNOSIS — Z13.220 LIPID SCREENING: ICD-10-CM

## 2024-12-02 DIAGNOSIS — L08.9 SKIN INFECTION: Primary | ICD-10-CM

## 2024-12-02 LAB
BASOPHILS ABSOLUTE: 0.07 K/UL (ref 0–0.2)
BASOPHILS RELATIVE PERCENT: 1 % (ref 0–2)
CHP ED QC CHECK: NORMAL
EOSINOPHILS ABSOLUTE: 0.13 K/UL (ref 0.05–0.5)
EOSINOPHILS RELATIVE PERCENT: 1 % (ref 0–6)
GLUCOSE BLD-MCNC: 98 MG/DL
HBA1C MFR BLD: 5.2 % (ref 4–5.6)
HCT VFR BLD CALC: 46.1 % (ref 37–54)
HEMOGLOBIN: 15 G/DL (ref 12.5–16.5)
IMMATURE GRANULOCYTES %: 0 % (ref 0–5)
IMMATURE GRANULOCYTES ABSOLUTE: <0.03 K/UL (ref 0–0.58)
LYMPHOCYTES ABSOLUTE: 2.79 K/UL (ref 1.5–4)
LYMPHOCYTES RELATIVE PERCENT: 29 % (ref 20–42)
MCH RBC QN AUTO: 31.9 PG (ref 26–35)
MCHC RBC AUTO-ENTMCNC: 32.5 G/DL (ref 32–34.5)
MCV RBC AUTO: 98.1 FL (ref 80–99.9)
MONOCYTES ABSOLUTE: 0.72 K/UL (ref 0.1–0.95)
MONOCYTES RELATIVE PERCENT: 8 % (ref 2–12)
NEUTROPHILS ABSOLUTE: 5.82 K/UL (ref 1.8–7.3)
NEUTROPHILS RELATIVE PERCENT: 61 % (ref 43–80)
PDW BLD-RTO: 13.2 % (ref 11.5–15)
PLATELET # BLD: 304 K/UL (ref 130–450)
PMV BLD AUTO: 10.6 FL (ref 7–12)
RBC # BLD: 4.7 M/UL (ref 3.8–5.8)
WBC # BLD: 9.6 K/UL (ref 4.5–11.5)

## 2024-12-02 PROCEDURE — 3017F COLORECTAL CA SCREEN DOC REV: CPT

## 2024-12-02 PROCEDURE — G8417 CALC BMI ABV UP PARAM F/U: HCPCS

## 2024-12-02 PROCEDURE — 3074F SYST BP LT 130 MM HG: CPT

## 2024-12-02 PROCEDURE — G8484 FLU IMMUNIZE NO ADMIN: HCPCS

## 2024-12-02 PROCEDURE — 3079F DIAST BP 80-89 MM HG: CPT

## 2024-12-02 PROCEDURE — 1036F TOBACCO NON-USER: CPT

## 2024-12-02 PROCEDURE — G8427 DOCREV CUR MEDS BY ELIG CLIN: HCPCS

## 2024-12-02 PROCEDURE — 82962 GLUCOSE BLOOD TEST: CPT

## 2024-12-02 PROCEDURE — 99214 OFFICE O/P EST MOD 30 MIN: CPT

## 2024-12-02 RX ORDER — SULFAMETHOXAZOLE AND TRIMETHOPRIM 800; 160 MG/1; MG/1
1 TABLET ORAL 2 TIMES DAILY
Qty: 14 TABLET | Refills: 0 | Status: SHIPPED | OUTPATIENT
Start: 2024-12-02 | End: 2024-12-09

## 2024-12-02 NOTE — PROGRESS NOTES
[facility]  Department of Family Medicine  Family Medicine Residency Program    Shahbaz Montgomery (:  1975) is a 49 y.o. male,Established patient, here for evaluation of the following chief complaint(s):  Hypoglycemia (BGS in the 80's this morning, Family H/O of diabetes) and Fatigue (Feeling \"off\", requesting labs)      ASSESSMENT/PLAN:    1. Skin infection  Following tattoo earlier this week.  Will treat skin and soft tissue infection with Bactrim as below.  Counseled patient on signs/symptoms which would require immediate return for repeat evaluation.  Will order lab work as below at patient's request for the below complaints however these are likely secondary to his infection  - sulfamethoxazole-trimethoprim (BACTRIM DS;SEPTRA DS) 800-160 MG per tablet; Take 1 tablet by mouth 2 times daily for 7 days  Dispense: 14 tablet; Refill: 0    2. Weakness generalized  - POCT Glucose  - CBC with Auto Differential  - Comprehensive Metabolic Panel  - TSH  - Hemoglobin A1C    3. Palpitation  - CBC with Auto Differential  - Comprehensive Metabolic Panel  - TSH  - Hemoglobin A1C    4. Hyperglycemia  - CBC with Auto Differential  - Comprehensive Metabolic Panel  - TSH  - Hemoglobin A1C    5. Lipid screening  - Lipid Panel      Return if symptoms worsen or fail to improve.    SUBJECTIVE/OBJECTIVE:      \"I feel off\"  - palpitations earlier after drinking a monster energy drink  - some lightheadedness when he bent over to tie his shoes and then sharona up quickly  - checked his blood sugar this morning before eating and it was 89 which concerned him - he is not on any hypoglycemia causing medications  -- needs cholecystectomy by Dr. Hafsa BORJAS hand tattoo 5 days ago in [place]  --states that all supplies were clean, unused needles  --next day having redness  - no fever/chills  - no purulence    Review of Systems   Constitutional:  Positive for fatigue. Negative for chills and fever.

## 2024-12-02 NOTE — PROGRESS NOTES
S: 49 y.o. male with   Chief Complaint   Patient presents with    Hypoglycemia     BGS in the 80's this morning, Family H/O of diabetes    Fatigue     Feeling \"off\", requesting labs     Pt is here bc of palpatations and dizziness.  He is waiting to get his GB removed.      O: VS:  height is 1.753 m (5' 9\") and weight is 90.5 kg (199 lb 9.6 oz). His temporal temperature is 98.9 °F (37.2 °C). His blood pressure is 126/84 and his pulse is 88. His respiration is 18 and oxygen saturation is 98%.   BP Readings from Last 3 Encounters:   12/02/24 126/84   11/19/24 130/79   10/29/24 133/87     See resident note    Impression/Plan:   1) palpitations - labs ordered.  2) hand cellulitis - treat with antibiotics.       Health Maintenance Due   Topic Date Due    Lipids  01/31/2024    Shingles vaccine (1 of 2) Never done    COVID-19 Vaccine (4 - 2023-24 season) 09/01/2024    Hepatitis B vaccine (2 of 3 - 19+ 3-dose series) 10/28/2024       Attending Physician Statement  I have discussed the case, including pertinent history and exam findings with the resident.  I agree with the documented assessment and plan.  I have seen the patient and examined.     Eli Macias MD

## 2024-12-03 LAB
ALBUMIN: 4 G/DL (ref 3.5–5.2)
ALP BLD-CCNC: 79 U/L (ref 40–129)
ALT SERPL-CCNC: 12 U/L (ref 0–40)
ANION GAP SERPL CALCULATED.3IONS-SCNC: 8 MMOL/L (ref 7–16)
AST SERPL-CCNC: 15 U/L (ref 0–39)
BILIRUB SERPL-MCNC: 0.2 MG/DL (ref 0–1.2)
BUN BLDV-MCNC: 17 MG/DL (ref 6–20)
CALCIUM SERPL-MCNC: 9.1 MG/DL (ref 8.6–10.2)
CHLORIDE BLD-SCNC: 103 MMOL/L (ref 98–107)
CHOLESTEROL, TOTAL: 127 MG/DL
CO2: 27 MMOL/L (ref 22–29)
CREAT SERPL-MCNC: 0.7 MG/DL (ref 0.7–1.2)
GFR, ESTIMATED: >90 ML/MIN/1.73M2
GLUCOSE BLD-MCNC: 95 MG/DL (ref 74–99)
HDLC SERPL-MCNC: 53 MG/DL
LDL CHOLESTEROL: 63 MG/DL
POTASSIUM SERPL-SCNC: 4 MMOL/L (ref 3.5–5)
SODIUM BLD-SCNC: 138 MMOL/L (ref 132–146)
TOTAL PROTEIN: 6.7 G/DL (ref 6.4–8.3)
TRIGL SERPL-MCNC: 56 MG/DL
TSH SERPL DL<=0.05 MIU/L-ACNC: 1.08 UIU/ML (ref 0.27–4.2)
VLDLC SERPL CALC-MCNC: 11 MG/DL

## 2024-12-08 DIAGNOSIS — R10.84 GENERALIZED ABDOMINAL PAIN: ICD-10-CM

## 2024-12-08 DIAGNOSIS — K21.9 GASTROESOPHAGEAL REFLUX DISEASE, UNSPECIFIED WHETHER ESOPHAGITIS PRESENT: ICD-10-CM

## 2024-12-09 DIAGNOSIS — Z76.0 MEDICATION REFILL: ICD-10-CM

## 2024-12-09 DIAGNOSIS — M54.6 CHRONIC BILATERAL THORACIC BACK PAIN: ICD-10-CM

## 2024-12-09 DIAGNOSIS — G89.29 CHRONIC BILATERAL THORACIC BACK PAIN: ICD-10-CM

## 2024-12-09 DIAGNOSIS — L40.9 PSORIASIS: ICD-10-CM

## 2024-12-09 RX ORDER — SILDENAFIL 50 MG/1
50 TABLET, FILM COATED ORAL DAILY PRN
Qty: 30 TABLET | Refills: 2 | Status: SHIPPED | OUTPATIENT
Start: 2024-12-09

## 2024-12-09 RX ORDER — PANTOPRAZOLE SODIUM 40 MG/1
40 TABLET, DELAYED RELEASE ORAL
Qty: 30 TABLET | Refills: 5 | Status: SHIPPED | OUTPATIENT
Start: 2024-12-09

## 2024-12-09 RX ORDER — GABAPENTIN 800 MG/1
800 TABLET ORAL 3 TIMES DAILY
Qty: 90 TABLET | Refills: 0 | Status: SHIPPED | OUTPATIENT
Start: 2024-12-09 | End: 2025-01-08

## 2024-12-09 NOTE — TELEPHONE ENCOUNTER
Name of Medication(s) Requested:  Requested Prescriptions     Pending Prescriptions Disp Refills    clobetasol (OLUX) 0.05 % foam 100 g 2     Sig: apply topically twice a day       Medication is on current medication list Yes    Dosage and directions were verified? Yes    Quantity verified: 30 day supply     Pharmacy Verified?  Yes    Last Appointment:  9/30/2024    Future appts:  Future Appointments   Date Time Provider Department Center   12/18/2024  2:00 PM Flavio Sim MD [place] [facility] the [facility]   1/6/2025  9:30 [facility]        (If no appt send self scheduling link. .REFILLAPPT)  Scheduling request sent?     [] Yes  [x] No    Does patient need updated?  [] Yes  [x] No

## 2024-12-09 NOTE — TELEPHONE ENCOUNTER
Name of Medication(s) Requested:  Requested Prescriptions     Pending Prescriptions Disp Refills    pantoprazole (PROTONIX) 40 MG tablet [Pharmacy Med Name: PANTOPRAZOLE SOD DR 40 MG TAB] 30 tablet 3     Sig: TAKE 1 TABLET BY MOUTH EVERY DAY BEFORE BREAKFAST       Medication is on current medication list Yes    Dosage and directions were verified? Yes    Quantity verified: 30 day supply     Pharmacy Verified?  Yes    Last Appointment:  9/30/2024    Future appts:  Future Appointments   Date Time Provider Department Center   12/18/2024  2:00 PM [facility] [place] [facility]   1/6/2025  9:30 AM Satish Bagley DPM [facility]        (If no appt send self scheduling link. .REFILLAPPT)  Scheduling request sent?     [] Yes  [x] No    Does patient need updated?  [] Yes  [x] No

## 2024-12-09 NOTE — TELEPHONE ENCOUNTER
Name of Medication(s) Requested:  Requested Prescriptions     Pending Prescriptions Disp Refills    gabapentin (NEURONTIN) 800 MG tablet 90 tablet 0     Sig: Take 1 tablet by mouth 3 times daily for 120 days.    sildenafil (VIAGRA) 50 MG tablet 30 tablet 2     Sig: Take 1 tablet by mouth daily as needed for Erectile Dysfunction       Medication is on current medication list Yes    Dosage and directions were verified? Yes    Quantity verified: 30 day supply     Pharmacy Verified?  Yes    Last Appointment:  9/30/2024    Future appts:  Future Appointments   Date Time Provider Department Center   12/18/2024  2:00 PM Flavio Sim MD [place] [facility]   1/6/2025  9:30 [facility]        (If no appt send self scheduling link. .REFILLAPPT)  Scheduling request sent?     [] Yes  [x] No    Does patient need updated?  [] Yes  [x] No

## 2024-12-12 RX ORDER — CLOBETASOL PROPIONATE 0.5 MG/G
AEROSOL, FOAM TOPICAL
Qty: 100 G | Refills: 2 | Status: SHIPPED | OUTPATIENT
Start: 2024-12-12

## 2024-12-18 ENCOUNTER — OFFICE VISIT (OUTPATIENT)
Dept: FAMILY MEDICINE CLINIC | Age: 49
End: 2024-12-18

## 2024-12-18 VITALS
BODY MASS INDEX: 28.5 KG/M2 | TEMPERATURE: 98.2 F | OXYGEN SATURATION: 97 % | WEIGHT: 193 LBS | DIASTOLIC BLOOD PRESSURE: 85 MMHG | HEART RATE: 78 BPM | RESPIRATION RATE: 18 BRPM | SYSTOLIC BLOOD PRESSURE: 136 MMHG

## 2024-12-18 DIAGNOSIS — L30.9 ECZEMA, UNSPECIFIED TYPE: ICD-10-CM

## 2024-12-18 DIAGNOSIS — M54.6 CHRONIC BILATERAL THORACIC BACK PAIN: ICD-10-CM

## 2024-12-18 DIAGNOSIS — Z76.0 MEDICATION REFILL: ICD-10-CM

## 2024-12-18 DIAGNOSIS — G89.29 CHRONIC BILATERAL THORACIC BACK PAIN: ICD-10-CM

## 2024-12-18 DIAGNOSIS — Z76.0 MEDICATION REFILL: Primary | ICD-10-CM

## 2024-12-18 RX ORDER — FLUOCINONIDE TOPICAL SOLUTION USP, 0.05% 0.5 MG/ML
SOLUTION TOPICAL
Qty: 60 ML | Refills: 2 | Status: SHIPPED | OUTPATIENT
Start: 2024-12-18

## 2024-12-18 RX ORDER — GABAPENTIN 800 MG/1
800 TABLET ORAL 3 TIMES DAILY
Qty: 120 TABLET | Refills: 0 | Status: SHIPPED
Start: 2024-12-18 | End: 2024-12-19 | Stop reason: SDUPTHER

## 2024-12-18 RX ORDER — FLUOCINONIDE 0.5 MG/G
CREAM TOPICAL
Qty: 60 G | Refills: 2 | Status: SHIPPED | OUTPATIENT
Start: 2024-12-18

## 2024-12-18 ASSESSMENT — ENCOUNTER SYMPTOMS
CONSTIPATION: 0
ABDOMINAL PAIN: 0
BACK PAIN: 1
EYE PAIN: 0
NAUSEA: 0
BLOOD IN STOOL: 0
SHORTNESS OF BREATH: 0
VOMITING: 0
DIARRHEA: 0

## 2024-12-18 NOTE — PROGRESS NOTES
[facility]  Precepting Note    Subjective:  Follow up   Neck pain   Will need medication refills for upcoming travel  Cholecystectomy scheduled this month   Needs new referral for Derm, Clobetasol not covered by insurance    ROS otherwise negative    Past medical, surgical, family and social history were reviewed, non-contributory, and unchanged unless otherwise stated.    Objective:    /85   Pulse 78   Temp 98.2 °F (36.8 °C) (Temporal)   Resp 18   Wt 87.5 kg (193 lb)   SpO2 97%   BMI 28.50 kg/m²     Exam is as noted by resident with the following changes, additions or corrections:      Assessment/Plan:    Neck pain- gabapentin refilled  Psoriasis vs Eczema- Fluocinonide refilled. Referred to Derm     Attending Physician Statement  I have reviewed the chart, including any radiology or labs, and have seen the patient with the resident(s).  I personally reviewed and performed key elements of the history and exam.  I agree with the assessment, plan and orders as documented by the resident.  Please refer to the resident note for additional information.      Electronically signed by Tracey Landa MD on 12/18/2024 at 2:35 PM    
daily.  Dispense: 60 g; Refill: 2      Counseled regarding above diagnosis, including possible risks and complications, especially if left uncontrolled. Counseled regarding the possible side effects, risks, benefits and alternatives to treatment; patient and/or guardian verbalizes understanding, agrees, feels comfortable with and wishes to proceed with above treatment plan.    Call or go to ED immediately if symptoms worsen or persist. Advised patient to call with any new medication issues, and, as applicable, read all Rx info from pharmacy to assure aware of all possible risks and side effects of medication before taking.     Patient and/or guardian given opportunity to ask questions/raise concerns.The patient verbalized comfort and understanding of instructions.    I encourage further reading and education about your health conditions.Information on many health conditions is provided by the American Academy of Family Physicians: https://familydoctor.org/  Please bring any questions to me at your next visit.    Medication List:    Current Outpatient Medications   Medication Sig Dispense Refill    gabapentin (NEURONTIN) 800 MG tablet Take 1 tablet by mouth 3 times daily for 40 days. 120 tablet 0    fluocinonide (LIDEX) 0.05 % external solution Apply topically 2 times daily. 60 mL 2    fluocinonide (LIDEX) 0.05 % cream Apply topically 2 times daily. 60 g 2    pantoprazole (PROTONIX) 40 MG tablet TAKE 1 TABLET BY MOUTH EVERY DAY BEFORE BREAKFAST 30 tablet 5    sildenafil (VIAGRA) 50 MG tablet Take 1 tablet by mouth daily as needed for Erectile Dysfunction 30 tablet 2    oxyCODONE-acetaminophen (PERCOCET) 7.5-325 MG per tablet Take 1 tablet by mouth every 4 hours as needed for Pain.      atorvastatin (LIPITOR) 10 MG tablet Take 1 tablet by mouth daily 90 tablet 2    diclofenac sodium (VOLTAREN) 1 % GEL Apply 2 g topically 4 times daily 50 g 2    hydroCHLOROthiazide (HYDRODIURIL) 50 MG tablet Take 1 tablet by mouth

## 2024-12-18 NOTE — TELEPHONE ENCOUNTER
Patient called in, he was at the pharmacy    Gabapentin was sent incorrectly    They will not fill/insurance will not cover as written    States it should be 4 x daily for 30 days    Not 3 x daily for 40 days     Please resend   Consent 3/Introductory Paragraph: I gave the patient a chance to ask questions they had about the procedure.  Following this I explained the Mohs procedure and consent was obtained. The risks, benefits and alternatives to therapy were discussed in detail. Specifically, the risks of infection, scarring, bleeding, prolonged wound healing, incomplete removal, allergy to anesthesia, nerve injury and recurrence were addressed. Prior to the procedure, the treatment site was clearly identified and confirmed by the patient. All components of Universal Protocol/PAUSE Rule completed.

## 2024-12-19 RX ORDER — GABAPENTIN 800 MG/1
800 TABLET ORAL 4 TIMES DAILY
Qty: 120 TABLET | Refills: 0 | Status: SHIPPED | OUTPATIENT
Start: 2024-12-19 | End: 2025-01-18

## 2025-01-04 DIAGNOSIS — L30.9 ECZEMA, UNSPECIFIED TYPE: ICD-10-CM

## 2025-01-04 DIAGNOSIS — M54.6 CHRONIC BILATERAL THORACIC BACK PAIN: ICD-10-CM

## 2025-01-04 DIAGNOSIS — Z76.0 MEDICATION REFILL: ICD-10-CM

## 2025-01-04 DIAGNOSIS — M54.50 ACUTE RIGHT-SIDED LOW BACK PAIN WITHOUT SCIATICA: ICD-10-CM

## 2025-01-04 DIAGNOSIS — G89.29 CHRONIC BILATERAL THORACIC BACK PAIN: ICD-10-CM

## 2025-01-04 RX ORDER — GABAPENTIN 800 MG/1
800 TABLET ORAL 4 TIMES DAILY
Qty: 120 TABLET | Refills: 0 | Status: CANCELLED | OUTPATIENT
Start: 2025-01-04 | End: 2025-02-03

## 2025-01-04 RX ORDER — FLUOCINONIDE TOPICAL SOLUTION USP, 0.05% 0.5 MG/ML
SOLUTION TOPICAL
Qty: 60 ML | Refills: 2 | Status: CANCELLED | OUTPATIENT
Start: 2025-01-04

## 2025-01-04 RX ORDER — FLUOCINONIDE 0.5 MG/G
CREAM TOPICAL
Qty: 60 G | Refills: 2 | Status: CANCELLED | OUTPATIENT
Start: 2025-01-04

## 2025-01-06 NOTE — TELEPHONE ENCOUNTER
Addended by: Jose De Jesus Duffy on: 4/25/2019 11:48 PM     Modules accepted: Orders I refilled the required medication. Thank you

## 2025-01-06 NOTE — TELEPHONE ENCOUNTER
Name of Medication(s) Requested:  Requested Prescriptions     Pending Prescriptions Disp Refills    diclofenac sodium (VOLTAREN) 1 % GEL 50 g 2     Sig: Apply 2 g topically 4 times daily       Medication is on current medication list Yes    Dosage and directions were verified? Yes    Quantity verified: 90 day supply     Pharmacy Verified?  Yes    Last Appointment:  9/30/2024    Future appts:  Future Appointments   Date Time Provider Department Center   1/6/2025  9:30 AM [facility]        (If no appt send self scheduling link. .REFILLAPPT)  Scheduling request sent?     [] Yes  [x] No    Does patient need updated?  [] Yes  [x] No

## 2025-01-23 DIAGNOSIS — Z76.0 MEDICATION REFILL: ICD-10-CM

## 2025-01-23 DIAGNOSIS — M54.6 CHRONIC BILATERAL THORACIC BACK PAIN: ICD-10-CM

## 2025-01-23 DIAGNOSIS — G89.29 CHRONIC BILATERAL THORACIC BACK PAIN: ICD-10-CM

## 2025-01-23 RX ORDER — GABAPENTIN 800 MG/1
800 TABLET ORAL 4 TIMES DAILY
Qty: 120 TABLET | Refills: 1 | Status: SHIPPED | OUTPATIENT
Start: 2025-01-23 | End: 2025-03-24

## 2025-01-23 RX ORDER — SILDENAFIL 50 MG/1
50 TABLET, FILM COATED ORAL DAILY PRN
Qty: 30 TABLET | Refills: 2 | Status: CANCELLED | OUTPATIENT
Start: 2025-01-23

## 2025-01-23 NOTE — TELEPHONE ENCOUNTER
Sildenafil ordered 12/9/2024 for #30 with 2 refills    Name of Medication(s) Requested:  Requested Prescriptions     Pending Prescriptions Disp Refills    gabapentin (NEURONTIN) 800 MG tablet 120 tablet 1     Sig: Take 1 tablet by mouth 4 times daily for 60 days.       Medication is on current medication list Yes    Dosage and directions were verified? Yes    Quantity verified: 30 day supply     Pharmacy Verified?  Yes    Last Appointment:  12/18/2024    Future appts:  No future appointments.     (If no appt send self scheduling link. .REFILLAPPT)  Scheduling request sent?     [x] Yes  [] No    Does patient need updated?  [] Yes  [x] No

## 2025-02-14 ENCOUNTER — OFFICE VISIT (OUTPATIENT)
Dept: FAMILY MEDICINE CLINIC | Age: 50
End: 2025-02-14
Payer: COMMERCIAL

## 2025-02-14 VITALS
BODY MASS INDEX: 28.88 KG/M2 | OXYGEN SATURATION: 98 % | DIASTOLIC BLOOD PRESSURE: 86 MMHG | TEMPERATURE: 98.4 F | WEIGHT: 195 LBS | HEIGHT: 69 IN | RESPIRATION RATE: 18 BRPM | SYSTOLIC BLOOD PRESSURE: 138 MMHG | HEART RATE: 100 BPM

## 2025-02-14 DIAGNOSIS — G89.29 CHRONIC BILATERAL THORACIC BACK PAIN: ICD-10-CM

## 2025-02-14 DIAGNOSIS — Z76.0 MEDICATION REFILL: ICD-10-CM

## 2025-02-14 DIAGNOSIS — M62.830 BACK MUSCLE SPASM: Primary | ICD-10-CM

## 2025-02-14 DIAGNOSIS — M54.6 CHRONIC BILATERAL THORACIC BACK PAIN: ICD-10-CM

## 2025-02-14 PROCEDURE — 3079F DIAST BP 80-89 MM HG: CPT

## 2025-02-14 PROCEDURE — 1036F TOBACCO NON-USER: CPT

## 2025-02-14 PROCEDURE — G8417 CALC BMI ABV UP PARAM F/U: HCPCS

## 2025-02-14 PROCEDURE — 99213 OFFICE O/P EST LOW 20 MIN: CPT

## 2025-02-14 PROCEDURE — 3075F SYST BP GE 130 - 139MM HG: CPT

## 2025-02-14 PROCEDURE — G8427 DOCREV CUR MEDS BY ELIG CLIN: HCPCS

## 2025-02-14 PROCEDURE — 3017F COLORECTAL CA SCREEN DOC REV: CPT

## 2025-02-14 RX ORDER — LIDOCAINE 4 G/G
1 PATCH TOPICAL DAILY
Qty: 20 EACH | Refills: 0 | Status: SHIPPED | OUTPATIENT
Start: 2025-02-14

## 2025-02-14 RX ORDER — CLOBETASOL PROPIONATE 0.5 MG/G
AEROSOL, FOAM TOPICAL
COMMUNITY
Start: 2025-01-05 | End: 2025-02-18 | Stop reason: SDUPTHER

## 2025-02-14 ASSESSMENT — PATIENT HEALTH QUESTIONNAIRE - PHQ9: DEPRESSION UNABLE TO ASSESS: URGENT/EMERGENT SITUATION

## 2025-02-14 NOTE — PROGRESS NOTES
S: 49 y.o. male with   Chief Complaint   Patient presents with    Flank Pain     B/L worse over the past week or so  Would like to move forward with gallbladder surgery        Pt is here bc of generalized back pain that worsened this week.  He has chronic back pain.    O: VS:  height is 1.753 m (5' 9\") and weight is 88.5 kg (195 lb). His temporal temperature is 98.4 °F (36.9 °C). His blood pressure is 138/86 and his pulse is 100. His respiration is 18 and oxygen saturation is 98%.   BP Readings from Last 3 Encounters:   02/14/25 138/86   12/18/24 136/85   12/02/24 126/84     See resident note    Impression/Plan:   1) back pain - pt ed on PMR, exercises, lidocaine patch and voltaren gel.        Health Maintenance Due   Topic Date Due    Shingles vaccine (1 of 2) Never done    Pneumococcal 50+ years Vaccine (1 of 1 - PCV) Never done    COVID-19 Vaccine (4 - 2024-25 season) 09/01/2024    Hepatitis B vaccine (2 of 3 - 19+ 3-dose series) 10/28/2024         Attending Physician Statement  I have discussed the case, including pertinent history and exam findings with the resident.  I agree with the documented assessment and plan.      Eli Macias MD

## 2025-02-14 NOTE — TELEPHONE ENCOUNTER
Name of Medication(s) Requested:  Requested Prescriptions     Pending Prescriptions Disp Refills    gabapentin (NEURONTIN) 800 MG tablet 120 tablet 0     Sig: Take 1 tablet by mouth 4 times daily for 60 days.       Medication is on current medication list Yes    Dosage and directions were verified? Yes    Quantity verified: 30 day supply     Pharmacy Verified?  Yes    Last Appointment:  12/18/2024    Future appts:  Future Appointments   Date Time Provider Department Center   3/18/2025  3:30 PM Flavio Sim MD [place] [facility] DEP        (If no appt send self scheduling link. .REFILLAPPT)  Scheduling request sent?     [] Yes  [x] No    Does patient need updated?  [] Yes  [x] No

## 2025-02-14 NOTE — PROGRESS NOTES
[facility]  Department of Family Medicine  Family Medicine Residency Program    Shahbaz Montgomery (:  1975) is a 49 y.o. male,Established patient, here for evaluation of the following chief complaint(s):  Flank Pain (B/L worse over the past week or so/Would like to move forward with gallbladder surgery )      ASSESSMENT/PLAN:    1. Back muscle spasm  No red flags on history or physical exam.  Provided education and materials on stretches/exercises.  Offered muscle relaxant however he declines.  Will provide Voltaren and lidocaine patches as below.  - diclofenac sodium (VOLTAREN) 1 % GEL; Apply 2 g topically 4 times daily  Dispense: 50 g; Refill: 2  - lidocaine (LIDOCAINE PAIN RELIEF) 4 % external patch; Place 1 patch onto the skin daily  Dispense: 20 each; Refill: 0      Return if symptoms worsen or fail to improve.    SUBJECTIVE/OBJECTIVE:  HPI    Here for back pain  Hx of chronic thoracic back pain  Stabbing/burning pain  No saddle anesthesia or retention or incontinence      Review of Systems        Diagnosis Date    Arthritis     Chronic back pain 2011    Eczema     Essential hypertension     GERD (gastroesophageal reflux disease)     Hyperlipidemia            Procedure Laterality Date    ANKLE SURGERY Right 2009    CYSTOSCOPY      UPPER GASTROINTESTINAL ENDOSCOPY N/A 10/8/2024    ESOPHAGOGASTRODUODENOSCOPY BIOPSY performed by Denver Pereyra MD at [facility] ENDOSCOPY       Morphine and Penicillins    Social History     Socioeconomic History    Marital status: Single     Spouse name: Not on file    Number of children: Not on file    Years of education: Not on file    Highest education level: Not on file   Occupational History    Not on file   Tobacco Use    Smoking status: Former     Current packs/day: 0.00     Average packs/day: 2.0 packs/day for 7.0 years (14.0 ttl pk-yrs)     Types: Cigarettes     Start date:      Quit date:      Years since quittin.1

## 2025-02-14 NOTE — TELEPHONE ENCOUNTER
Name of Medication(s) Requested:  Requested Prescriptions     Pending Prescriptions Disp Refills    sildenafil (VIAGRA) 50 MG tablet 30 tablet 2     Sig: Take 1 tablet by mouth daily as needed for Erectile Dysfunction       Medication is on current medication list Yes    Dosage and directions were verified? Yes    Quantity verified: 30 day supply     Pharmacy Verified?  Yes    Last Appointment:  12/18/2024    Future appts:  Future Appointments   Date Time Provider Department Center   3/18/2025  3:30 PM Flavio Sim MD [place] [facility] E[facility] DEP        (If no appt send self scheduling link. .REFILLAPPT)  Scheduling request sent?     [] Yes  [x] No    Does patient need updated?  [] Yes  [x] No

## 2025-02-15 RX ORDER — SILDENAFIL 50 MG/1
50 TABLET, FILM COATED ORAL DAILY PRN
Qty: 30 TABLET | Refills: 2 | Status: SHIPPED | OUTPATIENT
Start: 2025-02-15

## 2025-02-15 RX ORDER — GABAPENTIN 800 MG/1
800 TABLET ORAL 4 TIMES DAILY
Qty: 120 TABLET | Refills: 0 | Status: SHIPPED | OUTPATIENT
Start: 2025-02-15 | End: 2025-03-17

## 2025-02-16 DIAGNOSIS — M54.6 CHRONIC BILATERAL THORACIC BACK PAIN: ICD-10-CM

## 2025-02-16 DIAGNOSIS — Z76.0 MEDICATION REFILL: ICD-10-CM

## 2025-02-16 DIAGNOSIS — G89.29 CHRONIC BILATERAL THORACIC BACK PAIN: ICD-10-CM

## 2025-02-17 RX ORDER — GABAPENTIN 800 MG/1
800 TABLET ORAL 4 TIMES DAILY
Qty: 120 TABLET | Refills: 0 | OUTPATIENT
Start: 2025-02-17 | End: 2025-03-19

## 2025-02-18 DIAGNOSIS — G89.29 CHRONIC BILATERAL THORACIC BACK PAIN: ICD-10-CM

## 2025-02-18 DIAGNOSIS — M54.6 CHRONIC BILATERAL THORACIC BACK PAIN: ICD-10-CM

## 2025-02-18 DIAGNOSIS — L40.9 PSORIASIS: ICD-10-CM

## 2025-02-18 DIAGNOSIS — Z76.0 MEDICATION REFILL: ICD-10-CM

## 2025-02-18 RX ORDER — CLOBETASOL PROPIONATE 0.5 MG/ML
LOTION TOPICAL
Qty: 118 ML | Refills: 1 | Status: CANCELLED | OUTPATIENT
Start: 2025-02-18

## 2025-02-18 RX ORDER — CLOBETASOL PROPIONATE 0.5 MG/G
AEROSOL, FOAM TOPICAL
Qty: 100 G | Refills: 1 | Status: SHIPPED | OUTPATIENT
Start: 2025-02-18

## 2025-02-18 NOTE — TELEPHONE ENCOUNTER
Patient left message requesting refills for Gabapentin and Clobetasol Cream & Foam.     The Gabapentin was ordered on 2/15/2025 for #120. I called and informed patient of this. Other orders pended for provider approval.

## 2025-02-19 RX ORDER — GABAPENTIN 800 MG/1
800 TABLET ORAL 4 TIMES DAILY
Qty: 120 TABLET | Refills: 0 | OUTPATIENT
Start: 2025-02-19 | End: 2025-03-21

## 2025-02-28 ENCOUNTER — PREP FOR PROCEDURE (OUTPATIENT)
Dept: SURGERY | Age: 50
End: 2025-02-28

## 2025-02-28 ENCOUNTER — TELEPHONE (OUTPATIENT)
Dept: SURGERY | Age: 50
End: 2025-02-28

## 2025-02-28 DIAGNOSIS — K82.8 DYSFUNCTIONAL GALLBLADDER: ICD-10-CM

## 2025-02-28 NOTE — TELEPHONE ENCOUNTER
Shahbaz MARINA Montgomery is scheduled for laparoscopic robotic XI assisted cholecystectomy with Dr Pereyra on 04-07-25 at [facility]. Patient needs to be NPO after midnight the night before procedure. All surgery instructions were explained to the patient and a surgery letter was also mailed out. MA informed patient that PAT will also be calling to review pre-op instructions and medications. Patient verbalized understanding.  Electronically signed by Camila Alvarez MA on 2/28/2025 at 5:58 AM

## 2025-03-11 DIAGNOSIS — L40.9 PSORIASIS: ICD-10-CM

## 2025-03-11 DIAGNOSIS — Z76.0 MEDICATION REFILL: ICD-10-CM

## 2025-03-11 DIAGNOSIS — G89.29 CHRONIC BILATERAL THORACIC BACK PAIN: ICD-10-CM

## 2025-03-11 DIAGNOSIS — M54.6 CHRONIC BILATERAL THORACIC BACK PAIN: ICD-10-CM

## 2025-03-12 RX ORDER — GABAPENTIN 800 MG/1
800 TABLET ORAL 4 TIMES DAILY
Qty: 28 TABLET | Refills: 0 | Status: SHIPPED | OUTPATIENT
Start: 2025-03-12 | End: 2025-03-19

## 2025-03-12 RX ORDER — CLOBETASOL PROPIONATE 0.5 MG/G
AEROSOL, FOAM TOPICAL
Qty: 100 G | Refills: 1 | Status: SHIPPED | OUTPATIENT
Start: 2025-03-12

## 2025-03-12 NOTE — TELEPHONE ENCOUNTER
I refilled the required medication for 7 days, the patient is scheduled to be seen in the office next week. We will discuss at the next visit

## 2025-03-12 NOTE — TELEPHONE ENCOUNTER
Name of Medication(s) Requested:  Requested Prescriptions     Pending Prescriptions Disp Refills    gabapentin (NEURONTIN) 800 MG tablet 120 tablet 0     Sig: Take 1 tablet by mouth 4 times daily for 30 days.       Medication is on current medication list Yes    Dosage and directions were verified? Yes    Quantity verified: 30 day supply     Pharmacy Verified?  Yes    Last Appointment:  12/18/2024    Future appts:  Future Appointments   Date Time Provider Department Center   3/18/2025  3:30 PM Flavio Sim MD [facility]   4/22/2025  1:00 PM [facility]        (If no appt send self scheduling link. .REFILLAPPT)  Scheduling request sent?     [] Yes  [x] No    Does patient need updated?  [] Yes  [x] No

## 2025-03-12 NOTE — TELEPHONE ENCOUNTER
Name of Medication(s) Requested:  Requested Prescriptions     Pending Prescriptions Disp Refills    clobetasol (OLUX) 0.05 % foam 100 g 1     Sig: Apply topically 2 times daily.       Medication is on current medication list Yes    Dosage and directions were verified? Yes    Quantity verified: 30 day supply     Pharmacy Verified?  Yes    Last Appointment:  12/18/2024    Future appts:  Future Appointments   Date Time Provider Department Center   3/18/2025  3:30 PM Flavio Sim MD [facility]   4/22/2025  1:00 [facility]        (If no appt send self scheduling link. .REFILLAPPT)  Scheduling request sent?     [] Yes  [x] No    Does patient need updated?  [] Yes  [x] No

## 2025-03-20 DIAGNOSIS — M54.6 CHRONIC BILATERAL THORACIC BACK PAIN: ICD-10-CM

## 2025-03-20 DIAGNOSIS — G89.29 CHRONIC BILATERAL THORACIC BACK PAIN: ICD-10-CM

## 2025-03-20 DIAGNOSIS — Z76.0 MEDICATION REFILL: ICD-10-CM

## 2025-03-20 DIAGNOSIS — L08.9 SKIN INFECTION: ICD-10-CM

## 2025-03-20 RX ORDER — SULFAMETHOXAZOLE AND TRIMETHOPRIM 800; 160 MG/1; MG/1
1 TABLET ORAL 2 TIMES DAILY
Qty: 14 TABLET | Refills: 0 | OUTPATIENT
Start: 2025-03-20 | End: 2025-03-27

## 2025-03-21 RX ORDER — GABAPENTIN 800 MG/1
800 TABLET ORAL 4 TIMES DAILY
Qty: 28 TABLET | Refills: 0 | OUTPATIENT
Start: 2025-03-21 | End: 2025-03-28

## 2025-03-27 ENCOUNTER — OFFICE VISIT (OUTPATIENT)
Dept: FAMILY MEDICINE CLINIC | Age: 50
End: 2025-03-27

## 2025-03-27 VITALS
RESPIRATION RATE: 18 BRPM | HEART RATE: 91 BPM | OXYGEN SATURATION: 98 % | DIASTOLIC BLOOD PRESSURE: 78 MMHG | HEIGHT: 69 IN | SYSTOLIC BLOOD PRESSURE: 135 MMHG | TEMPERATURE: 97.7 F | BODY MASS INDEX: 30.4 KG/M2 | WEIGHT: 205.25 LBS

## 2025-03-27 DIAGNOSIS — S39.012A STRAIN OF LUMBAR REGION, INITIAL ENCOUNTER: Primary | ICD-10-CM

## 2025-03-27 DIAGNOSIS — R07.9 CHEST PAIN, UNSPECIFIED TYPE: ICD-10-CM

## 2025-03-27 DIAGNOSIS — K21.9 GASTROESOPHAGEAL REFLUX DISEASE, UNSPECIFIED WHETHER ESOPHAGITIS PRESENT: ICD-10-CM

## 2025-03-27 DIAGNOSIS — M54.6 CHRONIC BILATERAL THORACIC BACK PAIN: ICD-10-CM

## 2025-03-27 DIAGNOSIS — Z76.0 MEDICATION REFILL: ICD-10-CM

## 2025-03-27 DIAGNOSIS — G89.29 CHRONIC BILATERAL THORACIC BACK PAIN: ICD-10-CM

## 2025-03-27 LAB
ALBUMIN: 4 G/DL (ref 3.5–5.2)
ALP BLD-CCNC: 94 U/L (ref 40–129)
ALT SERPL-CCNC: 17 U/L (ref 0–40)
ANION GAP SERPL CALCULATED.3IONS-SCNC: 15 MMOL/L (ref 7–16)
AST SERPL-CCNC: 16 U/L (ref 0–39)
BASOPHILS ABSOLUTE: 0.07 K/UL (ref 0–0.2)
BASOPHILS RELATIVE PERCENT: 1 % (ref 0–2)
BILIRUB SERPL-MCNC: 0.2 MG/DL (ref 0–1.2)
BUN BLDV-MCNC: 15 MG/DL (ref 6–20)
CALCIUM SERPL-MCNC: 9.2 MG/DL (ref 8.6–10.2)
CHLORIDE BLD-SCNC: 104 MMOL/L (ref 98–107)
CO2: 23 MMOL/L (ref 22–29)
CREAT SERPL-MCNC: 0.8 MG/DL (ref 0.7–1.2)
EOSINOPHILS ABSOLUTE: 0.3 K/UL (ref 0.05–0.5)
EOSINOPHILS RELATIVE PERCENT: 3 % (ref 0–6)
GFR, ESTIMATED: >90 ML/MIN/1.73M2
GLUCOSE BLD-MCNC: 77 MG/DL (ref 74–99)
HCT VFR BLD CALC: 50.5 % (ref 37–54)
HEMOGLOBIN: 16.2 G/DL (ref 12.5–16.5)
IMMATURE GRANULOCYTES %: 0 % (ref 0–5)
IMMATURE GRANULOCYTES ABSOLUTE: 0.04 K/UL (ref 0–0.58)
LYMPHOCYTES ABSOLUTE: 2.38 K/UL (ref 1.5–4)
LYMPHOCYTES RELATIVE PERCENT: 25 % (ref 20–42)
MCH RBC QN AUTO: 31.4 PG (ref 26–35)
MCHC RBC AUTO-ENTMCNC: 32.1 G/DL (ref 32–34.5)
MCV RBC AUTO: 97.9 FL (ref 80–99.9)
MONOCYTES ABSOLUTE: 0.92 K/UL (ref 0.1–0.95)
MONOCYTES RELATIVE PERCENT: 10 % (ref 2–12)
NEUTROPHILS ABSOLUTE: 5.79 K/UL (ref 1.8–7.3)
NEUTROPHILS RELATIVE PERCENT: 61 % (ref 43–80)
PDW BLD-RTO: 13.4 % (ref 11.5–15)
PLATELET # BLD: 327 K/UL (ref 130–450)
PMV BLD AUTO: 9.7 FL (ref 7–12)
POTASSIUM SERPL-SCNC: 4.3 MMOL/L (ref 3.5–5)
RBC # BLD: 5.16 M/UL (ref 3.8–5.8)
SODIUM BLD-SCNC: 142 MMOL/L (ref 132–146)
TOTAL PROTEIN: 6.8 G/DL (ref 6.4–8.3)
WBC # BLD: 9.5 K/UL (ref 4.5–11.5)

## 2025-03-27 RX ORDER — GABAPENTIN 800 MG/1
800 TABLET ORAL 4 TIMES DAILY
Qty: 120 TABLET | Refills: 0 | Status: SHIPPED | OUTPATIENT
Start: 2025-03-27 | End: 2025-04-26

## 2025-03-27 RX ORDER — IBUPROFEN 600 MG/1
600 TABLET, FILM COATED ORAL 3 TIMES DAILY
Qty: 15 TABLET | Refills: 0 | Status: CANCELLED | OUTPATIENT
Start: 2025-03-27 | End: 2025-04-01

## 2025-03-27 RX ORDER — LIDOCAINE 50 MG/G
1 PATCH TOPICAL DAILY
Qty: 30 PATCH | Refills: 0 | Status: SHIPPED | OUTPATIENT
Start: 2025-03-27 | End: 2025-04-26

## 2025-03-27 RX ORDER — PANTOPRAZOLE SODIUM 40 MG/1
40 TABLET, DELAYED RELEASE ORAL
Qty: 30 TABLET | Refills: 5 | Status: SHIPPED | OUTPATIENT
Start: 2025-03-27

## 2025-03-27 ASSESSMENT — ENCOUNTER SYMPTOMS
SHORTNESS OF BREATH: 0
COUGH: 1

## 2025-03-27 NOTE — PROGRESS NOTES
[facility]  Precepting Note    Subjective:  Left sided back pain  X 3 days  Thinks this started after pushing a heavy cart  Worse with movement  No associated bowel/bladder issues    2 instances of chest pain   Sharp   45s-1m   No radiation  Happened when he was working out   No illicit substances at this time   Pt had MI in early 20s secondary to cocaine abuse     ROS otherwise negative    Past medical, surgical, family and social history were reviewed, non-contributory, and unchanged unless otherwise stated.    Objective:    /78   Pulse 91   Temp 97.7 °F (36.5 °C)   Resp 18   Ht 1.753 m (5' 9\")   Wt 93.1 kg (205 lb 4 oz)   SpO2 98%   BMI 30.31 kg/m²     Exam is as noted by resident with the following changes, additions or corrections:    General:  NAD; alert & oriented x 3   Heart:  regular rate   Lungs:  no increased work of breathing   Neuro: no focal deficits  Psych: pleasant, cooperative    Assessment/Plan:    Chest pain -   EKG today reassuring  However in light of history check stress  Surgery will need pushed back until this is completed     Lumbar strain -   Lidocaine patch   Consider steroids  NSAIDs avoided given above concerns        Attending Physician Statement  I have reviewed the chart, including any radiology or labs, and have seen the patient with the resident(s).  I personally reviewed and performed key elements of the history and exam.  I agree with the assessment, plan and orders as documented by the resident.  Please refer to the resident note for additional information.      Electronically signed by Michelle Paul MD on 3/27/2025 at 10:18 AM

## 2025-03-27 NOTE — PROGRESS NOTES
[facility]  Family Medicine Residency  Phone: [phone]  Fax: [phone]    Patient:  Shahbaz Montgomery 49 y.o. male                                 Date of Service: 3/27/25                            Chiefcomplaint:   Chief Complaint   Patient presents with    Medication Refill    Back Pain     10/10 pain today, off work 3 days, current regimen ineffective Has Hafsa scheduled 4/7/25    Letter for School/Work     Request Dr arteaga         History of Present Illness:     The patient is a 49 y.o. male  presented to the clinic with complaints as above.    Makes pool liners, has lots of heavy lifting. Missed last 3 days of work. Having left lower back pain. No radiation. Not in leg. Stabbing and burning pain. Was pushing a cart. Worse with movement. No bowel or bladder incontinence. Using alleve. Used twice yesterday. Did not help much.     No trouble urinating. It is dark, has a stronger odor. No pain burning or discomfort with urinating. No hx of kidney stones. No blood in the urine. Has lost weight since being in senior care was 310, out September 2021. now 205.     Is getting cholecystectomy with Dr. Pereyra in early April 7th.     Vapes marijuana. 3.5 ppd for 5 years. Was using opiates prior to senior care, now using street suboxone. Cocaine in his past, MI at 21. 2 instances in last week of chest pain. 45 seconds to 1 min. No radiation. Not cold or clammy. Was working out when it happened. No lightheadedness.  Quit caffeine.           Past Medical History:      Diagnosis Date    Arthritis     Chronic back pain 08/02/2011    Eczema     Essential hypertension     GERD (gastroesophageal reflux disease)     Hyperlipidemia        Past Surgical History:        Procedure Laterality Date    ANKLE SURGERY Right 01/01/2009    CYSTOSCOPY      UPPER GASTROINTESTINAL ENDOSCOPY N/A 10/8/2024    ESOPHAGOGASTRODUODENOSCOPY BIOPSY performed by Denver Pereyra MD at [facility] ENDOSCOPY       Allergies:

## 2025-04-01 ASSESSMENT — PATIENT HEALTH QUESTIONNAIRE - PHQ9
10. IF YOU CHECKED OFF ANY PROBLEMS, HOW DIFFICULT HAVE THESE PROBLEMS MADE IT FOR YOU TO DO YOUR WORK, TAKE CARE OF THINGS AT HOME, OR GET ALONG WITH OTHER PEOPLE: NOT DIFFICULT AT ALL
5. POOR APPETITE OR OVEREATING: MORE THAN HALF THE DAYS
SUM OF ALL RESPONSES TO PHQ QUESTIONS 1-9: 12
SUM OF ALL RESPONSES TO PHQ QUESTIONS 1-9: 12
4. FEELING TIRED OR HAVING LITTLE ENERGY: MORE THAN HALF THE DAYS
2. FEELING DOWN, DEPRESSED OR HOPELESS: NOT AT ALL
7. TROUBLE CONCENTRATING ON THINGS, SUCH AS READING THE NEWSPAPER OR WATCHING TELEVISION: MORE THAN HALF THE DAYS
SUM OF ALL RESPONSES TO PHQ QUESTIONS 1-9: 12
3. TROUBLE FALLING OR STAYING ASLEEP: NEARLY EVERY DAY
10. IF YOU CHECKED OFF ANY PROBLEMS, HOW DIFFICULT HAVE THESE PROBLEMS MADE IT FOR YOU TO DO YOUR WORK, TAKE CARE OF THINGS AT HOME, OR GET ALONG WITH OTHER PEOPLE: NOT DIFFICULT AT ALL
7. TROUBLE CONCENTRATING ON THINGS, SUCH AS READING THE NEWSPAPER OR WATCHING TELEVISION: MORE THAN HALF THE DAYS
6. FEELING BAD ABOUT YOURSELF - OR THAT YOU ARE A FAILURE OR HAVE LET YOURSELF OR YOUR FAMILY DOWN: NOT AT ALL
4. FEELING TIRED OR HAVING LITTLE ENERGY: MORE THAN HALF THE DAYS
8. MOVING OR SPEAKING SO SLOWLY THAT OTHER PEOPLE COULD HAVE NOTICED. OR THE OPPOSITE, BEING SO FIGETY OR RESTLESS THAT YOU HAVE BEEN MOVING AROUND A LOT MORE THAN USUAL: NOT AT ALL
SUM OF ALL RESPONSES TO PHQ QUESTIONS 1-9: 12
8. MOVING OR SPEAKING SO SLOWLY THAT OTHER PEOPLE COULD HAVE NOTICED. OR THE OPPOSITE - BEING SO FIDGETY OR RESTLESS THAT YOU HAVE BEEN MOVING AROUND A LOT MORE THAN USUAL: NOT AT ALL
9. THOUGHTS THAT YOU WOULD BE BETTER OFF DEAD, OR OF HURTING YOURSELF: NOT AT ALL
6. FEELING BAD ABOUT YOURSELF - OR THAT YOU ARE A FAILURE OR HAVE LET YOURSELF OR YOUR FAMILY DOWN: NOT AT ALL
9. THOUGHTS THAT YOU WOULD BE BETTER OFF DEAD, OR OF HURTING YOURSELF: NOT AT ALL
3. TROUBLE FALLING OR STAYING ASLEEP: NEARLY EVERY DAY
5. POOR APPETITE OR OVEREATING: MORE THAN HALF THE DAYS
1. LITTLE INTEREST OR PLEASURE IN DOING THINGS: NEARLY EVERY DAY
SUM OF ALL RESPONSES TO PHQ QUESTIONS 1-9: 12
2. FEELING DOWN, DEPRESSED OR HOPELESS: NOT AT ALL
1. LITTLE INTEREST OR PLEASURE IN DOING THINGS: NEARLY EVERY DAY

## 2025-04-02 ENCOUNTER — TELEPHONE (OUTPATIENT)
Dept: CARDIOLOGY | Age: 50
End: 2025-04-02

## 2025-04-02 NOTE — TELEPHONE ENCOUNTER
Patient called for cardiac clearance needing a stress test before 4/7/25.  Called [facility] office also.  No availability at any of our sites.  Florecita from [facility] cannot schedule patient due to needing to go out a few weeks for authorization.          Electronically signed by Judi Curiel on 4/2/2025 at 11:51 AM

## 2025-04-02 NOTE — TELEPHONE ENCOUNTER
Pt called to schedule stress test. Pt stated that he has a surgery on 4/7 and needs it to be done beforehand. Pt was transferred to hospital due to not having availability.      Electronically signed by Renita Camarillo on 4/2/2025 at 10:41 AM

## 2025-04-03 ENCOUNTER — OFFICE VISIT (OUTPATIENT)
Dept: FAMILY MEDICINE CLINIC | Age: 50
End: 2025-04-03

## 2025-04-03 ENCOUNTER — TELEPHONE (OUTPATIENT)
Dept: FAMILY MEDICINE CLINIC | Age: 50
End: 2025-04-03

## 2025-04-03 VITALS
HEART RATE: 112 BPM | DIASTOLIC BLOOD PRESSURE: 96 MMHG | HEIGHT: 69 IN | RESPIRATION RATE: 18 BRPM | OXYGEN SATURATION: 98 % | WEIGHT: 206.6 LBS | BODY MASS INDEX: 30.6 KG/M2 | TEMPERATURE: 98.5 F | SYSTOLIC BLOOD PRESSURE: 138 MMHG

## 2025-04-03 DIAGNOSIS — S39.012A STRAIN OF LUMBAR REGION, INITIAL ENCOUNTER: Primary | ICD-10-CM

## 2025-04-03 RX ORDER — LIDOCAINE 50 MG/G
1 PATCH TOPICAL DAILY
Qty: 10 PATCH | Refills: 0 | Status: SHIPPED | OUTPATIENT
Start: 2025-04-03 | End: 2025-04-13

## 2025-04-03 RX ORDER — CLOBETASOL PROPIONATE 0.5 MG/ML
LOTION TOPICAL 2 TIMES DAILY
COMMUNITY
Start: 2025-03-26

## 2025-04-03 RX ORDER — CIPROFLOXACIN AND DEXAMETHASONE 3; 1 MG/ML; MG/ML
SUSPENSION/ DROPS AURICULAR (OTIC)
COMMUNITY
Start: 2025-03-24

## 2025-04-03 NOTE — TELEPHONE ENCOUNTER
Last Appointment   3/27/2025  Next Appointment  4/3/2025  Patient is scheduled for surgery next week, can not get into [facility] for the stress test, I am sending it to [facility] for him, he stated they can get him in sooner. Ph [phone]

## 2025-04-03 NOTE — PROGRESS NOTES
[facility]  Department of Family Medicine  Family Medicine Residency Program      Patient: Shahbaz Montgomery 50 y.o. male     Date of Service: 4/3/25      Chief complaint:   Chief Complaint   Patient presents with    Chronic Pain    Forms     Discuss HILTON/Disability paperwork       HISTORY OF PRESENTING ILLNESS     50 y.o. male with a significant PMHx of HTN, and chronic lower back pain presented to the clinic for FMLA paperwork    FMLA paperwork  The patient is scheduled to have cholecystectomy on April 7th. He mentioned that he still needs to have a stress test before having the surgery. He needs paperwork to be filled for the FMLA leave. He stated that his pain has been physically limiting him from working.    SHELLY  The patient mentioned that he used to use opioids before. He then stopped using them and tried Suboxone. However, he was not able to follow with the Suboxone clinic due to long working hours and financial difficulties, so he is getting it from the street    Review of Systems:   Review of Systems   All other systems reviewed and are negative.    Health Maintenance:  Health Maintenance Due   Topic Date Due    Shingles vaccine (1 of 2) Never done    Pneumococcal 50+ years Vaccine (1 of 1 - PCV) Never done    COVID-19 Vaccine (4 - 2024-25 season) 09/01/2024    Hepatitis B vaccine (2 of 3 - 19+ 3-dose series) 10/28/2024     Past Medical History:      Diagnosis Date    Arthritis     Chronic back pain 08/02/2011    Eczema     Essential hypertension     GERD (gastroesophageal reflux disease)     Hyperlipidemia      Past Surgical History:        Procedure Laterality Date    ANKLE SURGERY Right 01/01/2009    CYSTOSCOPY      UPPER GASTROINTESTINAL ENDOSCOPY N/A 10/8/2024    ESOPHAGOGASTRODUODENOSCOPY BIOPSY performed by Denver Pereyra MD at [facility] ENDOSCOPY     Allergies:    Morphine and Penicillins  Social History:   Social History     Socioeconomic History    Marital status: Single

## 2025-04-04 ENCOUNTER — TELEPHONE (OUTPATIENT)
Dept: PHYSICAL THERAPY | Age: 50
End: 2025-04-04

## 2025-04-04 ENCOUNTER — TELEPHONE (OUTPATIENT)
Dept: SURGERY | Age: 50
End: 2025-04-04

## 2025-04-04 NOTE — TELEPHONE ENCOUNTER
Patient called and stated that he needs to have a stress and has to cancel his gallbladder surgery for 04-07-25.  Patient will call back to rescheduled once he is cleared.  Electronically signed by Camila Alvarez MA on 4/4/2025 at 10:36 AM

## 2025-04-07 ENCOUNTER — TELEPHONE (OUTPATIENT)
Dept: PHYSICAL THERAPY | Age: 50
End: 2025-04-07

## 2025-04-07 NOTE — PROGRESS NOTES
S: 50 y.o. male with   Chief Complaint   Patient presents with    Chronic Pain    Forms     Discuss [place]IL[place]/Disability paperwork       Pt here for disability paperwork for his GB pain.  He is scheduled to have GB surgery in the next week or so and is having trouble working bc of the pain.      O: VS:  height is 1.753 m (5' 9\") and weight is 93.7 kg (206 lb 9.6 oz). His temporal temperature is 98.5 °F (36.9 °C). His blood pressure is 138/96 (abnormal) and his pulse is 112 (abnormal). His respiration is 18 and oxygen saturation is 98%.   BP Readings from Last 3 Encounters:   04/03/25 (!) 138/96   03/27/25 135/78   02/14/25 138/86     See resident note    Impression/Plan:   1) GB pain - needs cardiac clearance prior to GB surgery.  Is scheduled for stress upcoming.  Will fill out paperwork starting from today.  2) hx of SHELLY - pt on suboxone that he buys himself.  Has been offered suboxone clinic in the past but has not come to the clinic yet.      Health Maintenance Due   Topic Date Due    Shingles vaccine (1 of 2) Never done    Pneumococcal 50+ years Vaccine (1 of 1 - PCV) Never done    COVID-19 Vaccine (4 - 2024-25 season) 09/01/2024    Hepatitis B vaccine (2 of 3 - 19+ 3-dose series) 10/28/2024         Attending Physician Statement  I have discussed the case, including pertinent history and exam findings with the resident. I also have seen the patient and performed key portions of the examination.  I agree with the documented assessment and plan.      Eli Macias MD

## 2025-04-21 ENCOUNTER — HOSPITAL ENCOUNTER (OUTPATIENT)
Dept: PHYSICAL THERAPY | Age: 50
Setting detail: THERAPIES SERIES
Discharge: HOME | End: 2025-04-21

## 2025-04-22 DIAGNOSIS — Z76.0 ENCOUNTER FOR ISSUE OF REPEAT PRESCRIPTION: ICD-10-CM

## 2025-04-22 DIAGNOSIS — L40.9 PSORIASIS, UNSPECIFIED: ICD-10-CM

## 2025-04-24 RX ORDER — CLOBETASOL PROPIONATE 0.5 MG/ML
LOTION TOPICAL 2 TIMES DAILY
Qty: 118 ML | Refills: 2 | OUTPATIENT
Start: 2025-04-24

## 2025-04-27 DIAGNOSIS — K21.9 GASTROESOPHAGEAL REFLUX DISEASE, UNSPECIFIED WHETHER ESOPHAGITIS PRESENT: ICD-10-CM

## 2025-04-27 DIAGNOSIS — R10.84 GENERALIZED ABDOMINAL PAIN: ICD-10-CM

## 2025-04-28 RX ORDER — SUCRALFATE 1 G/1
1 TABLET ORAL 3 TIMES DAILY
Qty: 270 TABLET | Refills: 1 | OUTPATIENT
Start: 2025-04-28

## 2025-04-29 ENCOUNTER — HOSPITAL ENCOUNTER (OUTPATIENT)
Dept: NUCLEAR MEDICINE | Age: 50
Discharge: HOME | End: 2025-04-29
Payer: COMMERCIAL

## 2025-04-29 ENCOUNTER — HOSPITAL ENCOUNTER (OUTPATIENT)
Age: 50
Discharge: HOME | End: 2025-05-01
Payer: COMMERCIAL

## 2025-04-29 DIAGNOSIS — R07.9 CHEST PAIN, UNSPECIFIED TYPE: ICD-10-CM

## 2025-04-29 LAB
NUC STRESS EJECTION FRACTION: 72 %
STRESS BASELINE DIAS BP: 98 MMHG
STRESS BASELINE HR: 68 BPM
STRESS BASELINE SYS BP: 144 MMHG
STRESS O2 SAT REST: 97 %
STRESS STAGE 1 BP: NORMAL MMHG
STRESS STAGE 1 COMMENTS: NORMAL
STRESS STAGE 1 DURATION: 2 MIN:SEC
STRESS STAGE 1 HR: 100 BPM
STRESS STAGE RECOVERY 1 BP: NORMAL MMHG
STRESS STAGE RECOVERY 1 COMMENTS: NORMAL
STRESS STAGE RECOVERY 1 DURATION: 1 MIN:SEC
STRESS STAGE RECOVERY 1 HR: 97 BPM
STRESS STAGE RECOVERY 2 BP: NORMAL MMHG
STRESS STAGE RECOVERY 2 COMMENTS: NORMAL
STRESS STAGE RECOVERY 2 DURATION: 1 MIN:SEC
STRESS STAGE RECOVERY 2 HR: 93 BPM
STRESS STAGE RECOVERY 3 BP: NORMAL MMHG
STRESS STAGE RECOVERY 3 COMMENTS: NORMAL
STRESS STAGE RECOVERY 3 DURATION: 1 MIN:SEC
STRESS STAGE RECOVERY 3 HR: 98 BPM
STRESS STAGE RECOVERY 4 BP: NORMAL MMHG
STRESS STAGE RECOVERY 4 COMMENTS: NORMAL
STRESS STAGE RECOVERY 4 DURATION: 1 MIN:SEC
STRESS STAGE RECOVERY 4 HR: 91 BPM
STRESS TARGET HR: 169 BPM

## 2025-04-29 PROCEDURE — A9500 TC99M SESTAMIBI: HCPCS | Performed by: RADIOLOGY

## 2025-04-29 PROCEDURE — 78452 HT MUSCLE IMAGE SPECT MULT: CPT

## 2025-04-29 PROCEDURE — 6360000002 HC RX W HCPCS: Performed by: INTERNAL MEDICINE

## 2025-04-29 PROCEDURE — 93018 CV STRESS TEST I&R ONLY: CPT | Performed by: INTERNAL MEDICINE

## 2025-04-29 PROCEDURE — 93017 CV STRESS TEST TRACING ONLY: CPT

## 2025-04-29 PROCEDURE — 78452 HT MUSCLE IMAGE SPECT MULT: CPT | Performed by: INTERNAL MEDICINE

## 2025-04-29 PROCEDURE — 93016 CV STRESS TEST SUPVJ ONLY: CPT | Performed by: INTERNAL MEDICINE

## 2025-04-29 PROCEDURE — 3430000000 HC RX DIAGNOSTIC RADIOPHARMACEUTICAL: Performed by: RADIOLOGY

## 2025-04-29 RX ORDER — TETRAKIS(2-METHOXYISOBUTYLISOCYANIDE)COPPER(I) TETRAFLUOROBORATE 1 MG/ML
30 INJECTION, POWDER, LYOPHILIZED, FOR SOLUTION INTRAVENOUS
Status: COMPLETED | OUTPATIENT
Start: 2025-04-29 | End: 2025-04-29

## 2025-04-29 RX ORDER — REGADENOSON 0.08 MG/ML
0.4 INJECTION, SOLUTION INTRAVENOUS
Status: COMPLETED | OUTPATIENT
Start: 2025-04-29 | End: 2025-04-29

## 2025-04-29 RX ORDER — TETRAKIS(2-METHOXYISOBUTYLISOCYANIDE)COPPER(I) TETRAFLUOROBORATE 1 MG/ML
10 INJECTION, POWDER, LYOPHILIZED, FOR SOLUTION INTRAVENOUS
Status: COMPLETED | OUTPATIENT
Start: 2025-04-29 | End: 2025-04-29

## 2025-04-29 RX ADMIN — Medication 10 MILLICURIE: at 07:26

## 2025-04-29 RX ADMIN — Medication 30 MILLICURIE: at 11:07

## 2025-04-29 RX ADMIN — REGADENOSON 0.4 MG: 0.08 INJECTION, SOLUTION INTRAVENOUS at 08:52

## 2025-05-05 SDOH — ECONOMIC STABILITY: FOOD INSECURITY: WITHIN THE PAST 12 MONTHS, THE FOOD YOU BOUGHT JUST DIDN'T LAST AND YOU DIDN'T HAVE MONEY TO GET MORE.: SOMETIMES TRUE

## 2025-05-05 SDOH — ECONOMIC STABILITY: FOOD INSECURITY: WITHIN THE PAST 12 MONTHS, YOU WORRIED THAT YOUR FOOD WOULD RUN OUT BEFORE YOU GOT MONEY TO BUY MORE.: SOMETIMES TRUE

## 2025-05-05 SDOH — ECONOMIC STABILITY: INCOME INSECURITY: IN THE LAST 12 MONTHS, WAS THERE A TIME WHEN YOU WERE NOT ABLE TO PAY THE MORTGAGE OR RENT ON TIME?: PATIENT DECLINED

## 2025-05-05 SDOH — ECONOMIC STABILITY: TRANSPORTATION INSECURITY
IN THE PAST 12 MONTHS, HAS THE LACK OF TRANSPORTATION KEPT YOU FROM MEDICAL APPOINTMENTS OR FROM GETTING MEDICATIONS?: YES

## 2025-05-05 SDOH — ECONOMIC STABILITY: TRANSPORTATION INSECURITY
IN THE PAST 12 MONTHS, HAS LACK OF TRANSPORTATION KEPT YOU FROM MEETINGS, WORK, OR FROM GETTING THINGS NEEDED FOR DAILY LIVING?: YES

## 2025-05-07 ENCOUNTER — OFFICE VISIT (OUTPATIENT)
Dept: FAMILY MEDICINE CLINIC | Age: 50
End: 2025-05-07
Payer: COMMERCIAL

## 2025-05-07 VITALS
DIASTOLIC BLOOD PRESSURE: 78 MMHG | BODY MASS INDEX: 30.96 KG/M2 | HEIGHT: 69 IN | HEART RATE: 98 BPM | TEMPERATURE: 97.6 F | SYSTOLIC BLOOD PRESSURE: 128 MMHG | WEIGHT: 209 LBS

## 2025-05-07 DIAGNOSIS — M62.830 BACK MUSCLE SPASM: ICD-10-CM

## 2025-05-07 DIAGNOSIS — Z01.818 PREOP EXAMINATION: Primary | ICD-10-CM

## 2025-05-07 LAB
ALBUMIN: 4 G/DL (ref 3.5–5.2)
ALP BLD-CCNC: 80 U/L (ref 40–129)
ALT SERPL-CCNC: 19 U/L (ref 0–50)
ANION GAP SERPL CALCULATED.3IONS-SCNC: 10 MMOL/L (ref 7–16)
AST SERPL-CCNC: 23 U/L (ref 0–50)
BASOPHILS ABSOLUTE: 0.04 K/UL (ref 0–0.2)
BASOPHILS RELATIVE PERCENT: 0 % (ref 0–2)
BILIRUB SERPL-MCNC: 0.2 MG/DL (ref 0–1.2)
BUN BLDV-MCNC: 18 MG/DL (ref 6–20)
CALCIUM SERPL-MCNC: 9.2 MG/DL (ref 8.6–10)
CHLORIDE BLD-SCNC: 104 MMOL/L (ref 98–107)
CO2: 28 MMOL/L (ref 22–29)
CREAT SERPL-MCNC: 1 MG/DL (ref 0.7–1.2)
EOSINOPHILS ABSOLUTE: 0.1 K/UL (ref 0.05–0.5)
EOSINOPHILS RELATIVE PERCENT: 1 % (ref 0–6)
GFR, ESTIMATED: >90 ML/MIN/1.73M2
GLUCOSE BLD-MCNC: 108 MG/DL (ref 74–99)
HCT VFR BLD CALC: 48.4 % (ref 37–54)
HEMOGLOBIN: 15.7 G/DL (ref 12.5–16.5)
IMMATURE GRANULOCYTES %: 0 % (ref 0–5)
IMMATURE GRANULOCYTES ABSOLUTE: <0.03 K/UL (ref 0–0.58)
LYMPHOCYTES ABSOLUTE: 2.32 K/UL (ref 1.5–4)
LYMPHOCYTES RELATIVE PERCENT: 26 % (ref 20–42)
MCH RBC QN AUTO: 31.7 PG (ref 26–35)
MCHC RBC AUTO-ENTMCNC: 32.4 G/DL (ref 32–34.5)
MCV RBC AUTO: 97.6 FL (ref 80–99.9)
MONOCYTES ABSOLUTE: 0.55 K/UL (ref 0.1–0.95)
MONOCYTES RELATIVE PERCENT: 6 % (ref 2–12)
NEUTROPHILS ABSOLUTE: 6.02 K/UL (ref 1.8–7.3)
NEUTROPHILS RELATIVE PERCENT: 67 % (ref 43–80)
PDW BLD-RTO: 13.1 % (ref 11.5–15)
PLATELET # BLD: 312 K/UL (ref 130–450)
PMV BLD AUTO: 10.2 FL (ref 7–12)
POTASSIUM SERPL-SCNC: 3.8 MMOL/L (ref 3.5–5.1)
RBC # BLD: 4.96 M/UL (ref 3.8–5.8)
SODIUM BLD-SCNC: 141 MMOL/L (ref 136–145)
TOTAL PROTEIN: 6.9 G/DL (ref 6.4–8.3)
WBC # BLD: 9.1 K/UL (ref 4.5–11.5)

## 2025-05-07 PROCEDURE — G8427 DOCREV CUR MEDS BY ELIG CLIN: HCPCS

## 2025-05-07 PROCEDURE — 3074F SYST BP LT 130 MM HG: CPT

## 2025-05-07 PROCEDURE — G8417 CALC BMI ABV UP PARAM F/U: HCPCS

## 2025-05-07 PROCEDURE — 3078F DIAST BP <80 MM HG: CPT

## 2025-05-07 PROCEDURE — 3017F COLORECTAL CA SCREEN DOC REV: CPT

## 2025-05-07 PROCEDURE — 1036F TOBACCO NON-USER: CPT

## 2025-05-07 PROCEDURE — 93000 ELECTROCARDIOGRAM COMPLETE: CPT

## 2025-05-07 PROCEDURE — 99213 OFFICE O/P EST LOW 20 MIN: CPT

## 2025-05-07 RX ORDER — GABAPENTIN 800 MG/1
800 TABLET ORAL 4 TIMES DAILY
COMMUNITY
Start: 2025-04-16 | End: 2025-05-16

## 2025-05-07 NOTE — PROGRESS NOTES
CC: Back Pain (Unable to complete PT due to death in family) and Abdominal Pain (GB surgery cancelled, plans to reschedule ASAP)      HPI:  Shahbaz Montgomery is a 50 y.o. yo male presents for a preoperative consultation at the request of surgeon, Dr. Pereyra, who plans on performing Cholecystectomy   The current problem began 1 year ago, and symptoms have been stable with time.  Conservative therapy: N/A.    Pre-Operative Risk assessment using 2014 ACC/AHA guidelines for non-cardiac surgery  Emergent procedure: No    Active Cardiac Condition: No   Risk Level of Procedure:   [] Low (0-1% of adverse cardiac events): superficial procedures, cataract/ breast surgery, endoscopy, superficial skin, ambulatory procedures.  [x] Intermediate (1-5%): carotid endarterectomy, intraperitoneal/intrathoracic surgery, orthopedic surgery, head and neck surgery, and prostate surgery.   [] High (> 5%): vascular or aortic repair surgery.  Measurement of Exercise Tolerance before Surgery >4 without symptoms: Pt can Walk indoors, such as around the house (1.75 METs), Do light work around the house, such as dusting or washing dishes (2.70 METs), Take care of self, that is eating, dressing, bathing, using the toilet (2.75 METs), Walk a block or two on level ground (2.75 METs), Do moderate work around the house such as vacuuming, sweeping floors, or carrying in groceries (3.50 METs), Do yardwork, such as raking leaves, weeding,or pushing a power mower (4.50 METs), Have sexual relations (5.25 METs), Climb a flight of stairs or walk up a hill (5.50 METs), Participate in moderate recreational activities, such as golf, bowling,dancing, double tennis, or throwing a baseball or football (6.00 METs), Participate in strenuous sport, such as swimming, singles tennis, football, basketball, or skiing (7.50 METs), and Do heavy work around the house, such as scrubbing floors or lifting or moving heavy furniture (8.00 METs).   Assessment of Risk

## 2025-05-07 NOTE — PATIENT INSTRUCTIONS
[place] [employer] - Financial Resources*  (Call [employer]/211 if need more resources.)       Utility:  [employer]  What they offer: Limited assistance to restore/ prevent utility disconnection.  Phone Number: [phone]  Address: [facility] [place] [address]  Website: [employer]  [facility] Action Program  Utility assistance  [phone]  [facility] Community Action Partnership  Utility assistance   433.408.8368  Community Action Agency of [facility]  Utility assistance  396.960.1564  Financial or Medical  HELP NETWORK OF [facility]:  What they do: Provides 24-hr, 7 days a week access to information on community resources for financial help. [facility] AND [facility]  Phone: 211 or [phone]            [facility] JOB AND FAMILY SERVICES:  MAIN [facility] LINE FOR ALL [facility]: [phone]  What they do: [place] works first with temporary cash assistance if there are children in household.   [facility] DJFS: [address] [place] [address]  Phone: [phone], [phone]  [facility] DJFS: [address] [place] [address]  Phone: [phone]  [facility] DJFS: [address] [place] [address]  Phone: [phone]  Website: [place].[facility]    [employer] Financial Assistance  What they offer: Assistance with [employer] bills  Phone: [phone], option #5   Medications:  [employer]  What they offer: [employer] tracks prescription drug prices and provides free drug coupons for discounts on medications.  Website: https://www.[employer]  [employer]  What they offer: [employer] offers free information on medications and healthcare cost savings programs including prescription assistance programs, coupons, and discount programs.  Helpline: 813.849.1045  Website: https://www.needKranemeds.org    [employer]  What they offer: Information about free and low-cost medicine programs.  Website:

## 2025-05-07 NOTE — PROGRESS NOTES
S: 50 y.o. male with   Chief Complaint   Patient presents with    Back Pain     Unable to complete PT due to death in family    Abdominal Pain     GB surgery cancelled, plans to reschedule ASAP       Pre-op cholecystectomy  HPL - stable  HTN - stable  GERD - stable    O: VS:  height is 1.753 m (5' 9\") and weight is 94.8 kg (209 lb). His temporal temperature is 97.6 °F (36.4 °C). His blood pressure is 128/78 and his pulse is 98.   BP Readings from Last 3 Encounters:   05/07/25 128/78   04/03/25 (!) 138/96   03/27/25 135/78     See resident note      Impression/Plan:   1) Pre-op - low risk for cholecystectomy.   2) HTN - stable  3) HPL - stable      Health Maintenance Due   Topic Date Due    Shingles vaccine (1 of 2) Never done    Pneumococcal 50+ years Vaccine (1 of 1 - PCV) Never done    COVID-19 Vaccine (4 - 2024-25 season) 09/01/2024    Hepatitis B vaccine (2 of 3 - 19+ 3-dose series) 10/28/2024         Attending Physician Statement  I have discussed the case, including pertinent history and exam findings with the resident.  I agree with the documented assessment and plan.      Sanya Fields MD

## 2025-05-12 ENCOUNTER — TELEPHONE (OUTPATIENT)
Dept: SURGERY | Age: 50
End: 2025-05-12

## 2025-05-12 ENCOUNTER — PREP FOR PROCEDURE (OUTPATIENT)
Dept: SURGERY | Age: 50
End: 2025-05-12

## 2025-05-12 NOTE — TELEPHONE ENCOUNTER
Shahbaz MARINA Montgomery is scheduled for laparoscopic robotic XI assisted cholecystectomy with Dr Pereyra on 06-04-25 at [facility]. Patient needs to be NPO after midnight the night before procedure. All surgery instructions were explained to the patient and a surgery letter was also mailed out. MA informed patient that PAT will also be calling to review pre-op instructions and medications. Patient verbalized understanding.  Electronically signed by Camila Alvarez MA on 5/12/2025 at 5:45 AM

## 2025-05-13 DIAGNOSIS — M54.6 PAIN IN THORACIC SPINE: ICD-10-CM

## 2025-05-13 DIAGNOSIS — Z76.0 ENCOUNTER FOR ISSUE OF REPEAT PRESCRIPTION: ICD-10-CM

## 2025-05-13 RX ORDER — GABAPENTIN 800 MG/1
800 TABLET ORAL 4 TIMES DAILY
Qty: 120 TABLET | Refills: 0 | OUTPATIENT
Start: 2025-05-13 | End: 2025-06-12

## 2025-05-13 NOTE — TELEPHONE ENCOUNTER
Name of Medication(s) Requested:  Requested Prescriptions     Pending Prescriptions Disp Refills    gabapentin (NEURONTIN) 800 MG tablet [Pharmacy Med Name: GABAPENTIN 800 MG TABLET] 120 tablet 0     Sig: Take 1 tablet by mouth in the morning, at noon, in the evening, and at bedtime for 30 days.       Medication is on current medication list Yes    Dosage and directions were verified? Yes    Quantity verified: 30 day supply     Pharmacy Verified?  Yes    Last Appointment:  5/7/2025    Future appts:  Future Appointments   Date Time Provider Department Center   5/23/2025  9:00 AM Chad Hill PT [facility]   6/11/2025  2:30 PM Flavio Sim MD [facility] DEP        (If no appt send self scheduling link. .REFILLAPPT)  Scheduling request sent?     [] Yes  [x] No    Does patient need updated?  [] Yes  [x] No

## 2025-05-15 ENCOUNTER — RESULTS FOLLOW-UP (OUTPATIENT)
Dept: FAMILY MEDICINE CLINIC | Age: 50
End: 2025-05-15

## 2025-05-16 ENCOUNTER — TELEPHONE (OUTPATIENT)
Dept: FAMILY MEDICINE CLINIC | Age: 50
End: 2025-05-16

## 2025-05-16 NOTE — TELEPHONE ENCOUNTER
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 Please do not include a return to work date, as pt is not sure the official date he'll be cleared to return but plans to speak to the surgeon about when he'll be cleared to go back.

## 2025-05-16 NOTE — TELEPHONE ENCOUNTER
I would be happy to offer this letter after he has this surgery. For now, we would need to wait until he gets it, or he can ask the surgeon if he as able to write him one.

## 2025-05-16 NOTE — TELEPHONE ENCOUNTER
Communicated this to patient; he stated he would bring in the form and explain precisely what he needs next week.

## 2025-05-22 DIAGNOSIS — Z76.0 MEDICATION REFILL: ICD-10-CM

## 2025-05-23 RX ORDER — SILDENAFIL 50 MG/1
50 TABLET, FILM COATED ORAL DAILY PRN
Qty: 30 TABLET | Refills: 2 | Status: SHIPPED | OUTPATIENT
Start: 2025-05-23

## 2025-05-23 NOTE — TELEPHONE ENCOUNTER
Name of Medication(s) Requested:  Requested Prescriptions     Pending Prescriptions Disp Refills    sildenafil (VIAGRA) 50 MG tablet 30 tablet 2     Sig: Take 1 tablet by mouth daily as needed for Erectile Dysfunction       Medication is on current medication list Yes    Dosage and directions were verified? Yes    Quantity verified: 30 day supply     Pharmacy Verified?  Yes    Last Appointment:  5/7/2025    Future appts:  Future Appointments   Date Time Provider Department Center   5/28/2025  1:30 PM Kayleen Flores MD [facility] DEP   6/11/2025  2:30 PM Flavio Sim MD [facility] DEP        (If no appt send self scheduling link. .REFILLAPPT)  Scheduling request sent?     [] Yes  [x] No    Does patient need updated?  [] Yes  [x] No

## 2025-05-30 ENCOUNTER — TELEPHONE (OUTPATIENT)
Dept: FAMILY MEDICINE CLINIC | Age: 50
End: 2025-05-30

## 2025-05-30 ENCOUNTER — TELEPHONE (OUTPATIENT)
Dept: PHYSICAL THERAPY | Age: 50
End: 2025-05-30

## 2025-05-30 NOTE — TELEPHONE ENCOUNTER
I tried calling the patient with the phone number in the chart.  However, I was transferred to the voicemail.  I left a voicemail mentioning that we will absolutely fill the needed paperwork on the day he gets his surgery done, as it is directly associated with it. I will make sure that it is faxed/emailed to the right destination.    Flavio Sim MD  PGY-1 family medicine resident

## 2025-06-02 ENCOUNTER — ANESTHESIA EVENT (OUTPATIENT)
Dept: OPERATING ROOM | Age: 50
End: 2025-06-02
Payer: COMMERCIAL

## 2025-06-02 NOTE — PROGRESS NOTES
[facility] PRE-ADMISSION TESTING INSTRUCTIONS    The Preadmission Testing patient is instructed accordingly using the following criteria (check applicable):    ARRIVAL INSTRUCTIONS:   Arrival Time: 1315    [x] Parking the day of Surgery is located in the Main Entrance lot.  Upon entering through the main entrance (Entrance A) make an immediate right to the surgery reception desk    [x] Bring photo ID and insurance card    [x] Please be sure to arrange for a responsible adult to provide transportation to and from the hospital    [x] Please arrange for a responsible adult to be with you for the 24 hour period post procedure, due to having anesthesia    [x] Please notify surgeon if you develop any illness between now and time of surgery (cold, cough, sore throat, fever, nausea, vomiting) or any signs of infections  including skin, wounds, and dental.    [x] If you awake am of surgery not feeling well or have temperature >100 please call [phone].    GENERAL INSTRUCTIONS:    [x] No solid foods after midnight, may have up to 8oz of water until 4 hours prior to surgery. No gum, no candy, no mints.  NPO time 1115       [x] You may brush your teeth    [x] Take medications as instructed (Read back and verified by patient)   Take protonix    [x] Follow preop dosing of blood thinners per physician instructions    [x] Take 1/2 dose of evening insulin, but no insulin after midnight    [x] Shower or bath with soap, lather and rinse well, AM of Surgery, no lotion, powders or creams to surgical site    [x] Please do not wear any nail polish, make up, hair products, body spray, aftershave, cologne or perfume on the day of surgery    [x] Jewelry, body piercings, eyeglasses, contact lenses and dentures are not permitted into surgery (bring cases)    [x] No tobacco products within 24 hours of surgery     [x] No alcohol or illegal drug use, marijuana included, within 24 hours of surgery.    [x] You can

## 2025-06-03 ASSESSMENT — LIFESTYLE VARIABLES: SMOKING_STATUS: 1

## 2025-06-03 NOTE — ANESTHESIA PRE PROCEDURE
Medical History:        Diagnosis Date    Arthritis     Chronic back pain 2011    Eczema     Essential hypertension     GERD (gastroesophageal reflux disease)     Hyperlipidemia        Past Surgical History:        Procedure Laterality Date    ANKLE SURGERY Right 2009    CYSTOSCOPY      UPPER GASTROINTESTINAL ENDOSCOPY N/A 10/8/2024    ESOPHAGOGASTRODUODENOSCOPY BIOPSY performed by Denver Pereyra MD at [facility] ENDOSCOPY       Social History:    Social History     Tobacco Use    Smoking status: Former     Current packs/day: 0.00     Average packs/day: 2.0 packs/day for 7.0 years (14.0 ttl pk-yrs)     Types: Cigarettes     Start date:      Quit date:      Years since quittin.4    Smokeless tobacco: Never   Substance Use Topics    Alcohol use: Yes     Comment: rare                                Counseling given: Not Answered      Vital Signs (Current):   Vitals:    25 1439   Weight: 99.8 kg (220 lb)   Height: 1.753 m (5' 9\")                                              BP Readings from Last 3 Encounters:   25 128/78   25 (!) 138/96   25 135/78       NPO Status:                                                                                 BMI:   Wt Readings from Last 3 Encounters:   25 94.8 kg (209 lb)   25 93.7 kg (206 lb 9.6 oz)   25 93.1 kg (205 lb 4 oz)     Body mass index is 32.49 kg/m².    CBC:   Lab Results   Component Value Date/Time    WBC 9.1 2025 03:10 PM    RBC 4.96 2025 03:10 PM    HGB 15.7 2025 03:10 PM    HCT 48.4 2025 03:10 PM    MCV 97.6 2025 03:10 PM    RDW 13.1 2025 03:10 PM     2025 03:10 PM       CMP:   Lab Results   Component Value Date/Time     2025 03:10 PM    K 3.8 2025 03:10 PM     2025 03:10 PM    CO2 28 2025 03:10 PM    BUN 18 2025 03:10 PM    CREATININE 1.0 2025 03:10 PM    GFRAA >60 10/09/2021 12:04 PM    LABGLOM >90 2025

## 2025-06-04 ENCOUNTER — ANESTHESIA (OUTPATIENT)
Dept: OPERATING ROOM | Age: 50
End: 2025-06-04
Payer: COMMERCIAL

## 2025-06-04 ENCOUNTER — HOSPITAL ENCOUNTER (OUTPATIENT)
Age: 50
Setting detail: OUTPATIENT SURGERY
Discharge: HOME | End: 2025-06-04
Attending: SURGERY | Admitting: SURGERY
Payer: COMMERCIAL

## 2025-06-04 ENCOUNTER — TELEPHONE (OUTPATIENT)
Dept: FAMILY MEDICINE CLINIC | Age: 50
End: 2025-06-04

## 2025-06-04 VITALS
SYSTOLIC BLOOD PRESSURE: 157 MMHG | BODY MASS INDEX: 32.58 KG/M2 | OXYGEN SATURATION: 96 % | RESPIRATION RATE: 21 BRPM | HEART RATE: 89 BPM | WEIGHT: 220 LBS | HEIGHT: 69 IN | TEMPERATURE: 97.7 F | DIASTOLIC BLOOD PRESSURE: 69 MMHG

## 2025-06-04 DIAGNOSIS — K82.8 DYSFUNCTIONAL GALLBLADDER: ICD-10-CM

## 2025-06-04 DIAGNOSIS — Z76.89 ENCOUNTER FOR CHOLECYSTECTOMY: Primary | ICD-10-CM

## 2025-06-04 DIAGNOSIS — K21.9 GASTROESOPHAGEAL REFLUX DISEASE, UNSPECIFIED WHETHER ESOPHAGITIS PRESENT: ICD-10-CM

## 2025-06-04 LAB
ANION GAP SERPL CALCULATED.3IONS-SCNC: 9 MMOL/L (ref 7–16)
BUN SERPL-MCNC: 14 MG/DL (ref 6–20)
CALCIUM SERPL-MCNC: 9.3 MG/DL (ref 8.6–10.2)
CHLORIDE SERPL-SCNC: 105 MMOL/L (ref 98–107)
CO2 SERPL-SCNC: 29 MMOL/L (ref 22–29)
CREAT SERPL-MCNC: 0.7 MG/DL (ref 0.7–1.2)
GFR, ESTIMATED: >90 ML/MIN/1.73M2
GLUCOSE SERPL-MCNC: 102 MG/DL (ref 74–99)
POTASSIUM SERPL-SCNC: 4.2 MMOL/L (ref 3.5–5)
SODIUM SERPL-SCNC: 143 MMOL/L (ref 132–146)

## 2025-06-04 PROCEDURE — 88304 TISSUE EXAM BY PATHOLOGIST: CPT

## 2025-06-04 PROCEDURE — 2500000003 HC RX 250 WO HCPCS: Performed by: NURSE ANESTHETIST, CERTIFIED REGISTERED

## 2025-06-04 PROCEDURE — C1889 IMPLANT/INSERT DEVICE, NOC: HCPCS | Performed by: SURGERY

## 2025-06-04 PROCEDURE — 3700000000 HC ANESTHESIA ATTENDED CARE: Performed by: SURGERY

## 2025-06-04 PROCEDURE — 3700000001 HC ADD 15 MINUTES (ANESTHESIA): Performed by: SURGERY

## 2025-06-04 PROCEDURE — 6370000000 HC RX 637 (ALT 250 FOR IP): Performed by: ANESTHESIOLOGY

## 2025-06-04 PROCEDURE — 2580000003 HC RX 258: Performed by: NURSE ANESTHETIST, CERTIFIED REGISTERED

## 2025-06-04 PROCEDURE — 2500000003 HC RX 250 WO HCPCS: Performed by: SURGERY

## 2025-06-04 PROCEDURE — 2500000003 HC RX 250 WO HCPCS: Performed by: ANESTHESIOLOGY

## 2025-06-04 PROCEDURE — 2709999900 HC NON-CHARGEABLE SUPPLY: Performed by: SURGERY

## 2025-06-04 PROCEDURE — S2900 ROBOTIC SURGICAL SYSTEM: HCPCS | Performed by: SURGERY

## 2025-06-04 PROCEDURE — 7100000001 HC PACU RECOVERY - ADDTL 15 MIN: Performed by: SURGERY

## 2025-06-04 PROCEDURE — 7100000000 HC PACU RECOVERY - FIRST 15 MIN: Performed by: SURGERY

## 2025-06-04 PROCEDURE — 47562 LAPAROSCOPIC CHOLECYSTECTOMY: CPT | Performed by: SURGERY

## 2025-06-04 PROCEDURE — 7100000011 HC PHASE II RECOVERY - ADDTL 15 MIN: Performed by: SURGERY

## 2025-06-04 PROCEDURE — 6360000002 HC RX W HCPCS: Performed by: NURSE ANESTHETIST, CERTIFIED REGISTERED

## 2025-06-04 PROCEDURE — 6360000002 HC RX W HCPCS: Performed by: SURGERY

## 2025-06-04 PROCEDURE — 6360000002 HC RX W HCPCS: Performed by: ANESTHESIOLOGY

## 2025-06-04 PROCEDURE — 7100000010 HC PHASE II RECOVERY - FIRST 15 MIN: Performed by: SURGERY

## 2025-06-04 PROCEDURE — 80048 BASIC METABOLIC PNL TOTAL CA: CPT

## 2025-06-04 PROCEDURE — 3600000019 HC SURGERY ROBOT ADDTL 15MIN: Performed by: SURGERY

## 2025-06-04 PROCEDURE — 3600000009 HC SURGERY ROBOT BASE: Performed by: SURGERY

## 2025-06-04 DEVICE — CLIP INT M L POLYMER LOK LIG HEM O LOK: Type: IMPLANTABLE DEVICE | Site: ABDOMEN | Status: FUNCTIONAL

## 2025-06-04 RX ORDER — SODIUM CHLORIDE, SODIUM LACTATE, POTASSIUM CHLORIDE, CALCIUM CHLORIDE 600; 310; 30; 20 MG/100ML; MG/100ML; MG/100ML; MG/100ML
INJECTION, SOLUTION INTRAVENOUS
Status: DISCONTINUED | OUTPATIENT
Start: 2025-06-04 | End: 2025-06-04 | Stop reason: SDUPTHER

## 2025-06-04 RX ORDER — ROCURONIUM BROMIDE 10 MG/ML
INJECTION, SOLUTION INTRAVENOUS
Status: DISCONTINUED | OUTPATIENT
Start: 2025-06-04 | End: 2025-06-04 | Stop reason: SDUPTHER

## 2025-06-04 RX ORDER — SODIUM CHLORIDE 0.9 % (FLUSH) 0.9 %
5-40 SYRINGE (ML) INJECTION EVERY 12 HOURS SCHEDULED
Status: DISCONTINUED | OUTPATIENT
Start: 2025-06-04 | End: 2025-06-04 | Stop reason: HOSPADM

## 2025-06-04 RX ORDER — HYDROMORPHONE HYDROCHLORIDE 2 MG/ML
INJECTION, SOLUTION INTRAMUSCULAR; INTRAVENOUS; SUBCUTANEOUS
Status: DISCONTINUED | OUTPATIENT
Start: 2025-06-04 | End: 2025-06-04 | Stop reason: SDUPTHER

## 2025-06-04 RX ORDER — MEPERIDINE HYDROCHLORIDE 50 MG/ML
12.5 INJECTION INTRAMUSCULAR; INTRAVENOUS; SUBCUTANEOUS ONCE
Status: DISCONTINUED | OUTPATIENT
Start: 2025-06-04 | End: 2025-06-04 | Stop reason: HOSPADM

## 2025-06-04 RX ORDER — ONDANSETRON 2 MG/ML
INJECTION INTRAMUSCULAR; INTRAVENOUS
Status: DISCONTINUED | OUTPATIENT
Start: 2025-06-04 | End: 2025-06-04 | Stop reason: SDUPTHER

## 2025-06-04 RX ORDER — SODIUM CHLORIDE 0.9 % (FLUSH) 0.9 %
5-40 SYRINGE (ML) INJECTION PRN
Status: DISCONTINUED | OUTPATIENT
Start: 2025-06-04 | End: 2025-06-04 | Stop reason: HOSPADM

## 2025-06-04 RX ORDER — NALOXONE HYDROCHLORIDE 0.4 MG/ML
INJECTION, SOLUTION INTRAMUSCULAR; INTRAVENOUS; SUBCUTANEOUS PRN
Status: DISCONTINUED | OUTPATIENT
Start: 2025-06-04 | End: 2025-06-04 | Stop reason: HOSPADM

## 2025-06-04 RX ORDER — DIPHENHYDRAMINE HYDROCHLORIDE 50 MG/ML
12.5 INJECTION, SOLUTION INTRAMUSCULAR; INTRAVENOUS
Status: DISCONTINUED | OUTPATIENT
Start: 2025-06-04 | End: 2025-06-04 | Stop reason: HOSPADM

## 2025-06-04 RX ORDER — ACETAMINOPHEN 500 MG
1000 TABLET ORAL ONCE
Status: COMPLETED | OUTPATIENT
Start: 2025-06-04 | End: 2025-06-04

## 2025-06-04 RX ORDER — ONDANSETRON 4 MG/1
4 TABLET, FILM COATED ORAL 3 TIMES DAILY PRN
Qty: 15 TABLET | Refills: 0 | Status: SHIPPED | OUTPATIENT
Start: 2025-06-04

## 2025-06-04 RX ORDER — METOCLOPRAMIDE HYDROCHLORIDE 5 MG/ML
10 INJECTION INTRAMUSCULAR; INTRAVENOUS ONCE
Status: COMPLETED | OUTPATIENT
Start: 2025-06-04 | End: 2025-06-04

## 2025-06-04 RX ORDER — PROPOFOL 10 MG/ML
INJECTION, EMULSION INTRAVENOUS
Status: DISCONTINUED | OUTPATIENT
Start: 2025-06-04 | End: 2025-06-04 | Stop reason: SDUPTHER

## 2025-06-04 RX ORDER — HYDRALAZINE HYDROCHLORIDE 20 MG/ML
INJECTION INTRAMUSCULAR; INTRAVENOUS
Status: DISCONTINUED | OUTPATIENT
Start: 2025-06-04 | End: 2025-06-04 | Stop reason: SDUPTHER

## 2025-06-04 RX ORDER — INDOCYANINE GREEN AND WATER 25 MG
5 KIT INJECTION
Status: COMPLETED | OUTPATIENT
Start: 2025-06-04 | End: 2025-06-04

## 2025-06-04 RX ORDER — POLYETHYLENE GLYCOL 3350 17 G/17G
17 POWDER, FOR SOLUTION ORAL DAILY
Qty: 238 G | Refills: 1 | Status: SHIPPED | OUTPATIENT
Start: 2025-06-04 | End: 2025-07-04

## 2025-06-04 RX ORDER — FENTANYL CITRATE 50 UG/ML
25 INJECTION, SOLUTION INTRAMUSCULAR; INTRAVENOUS EVERY 5 MIN PRN
Status: DISCONTINUED | OUTPATIENT
Start: 2025-06-04 | End: 2025-06-04 | Stop reason: HOSPADM

## 2025-06-04 RX ORDER — DEXAMETHASONE SODIUM PHOSPHATE 4 MG/ML
INJECTION, SOLUTION INTRA-ARTICULAR; INTRALESIONAL; INTRAMUSCULAR; INTRAVENOUS; SOFT TISSUE
Status: DISCONTINUED | OUTPATIENT
Start: 2025-06-04 | End: 2025-06-04 | Stop reason: SDUPTHER

## 2025-06-04 RX ORDER — METHOCARBAMOL 750 MG/1
750 TABLET, FILM COATED ORAL 4 TIMES DAILY
Qty: 40 TABLET | Refills: 0 | Status: SHIPPED | OUTPATIENT
Start: 2025-06-04 | End: 2025-06-11

## 2025-06-04 RX ORDER — BUPIVACAINE HYDROCHLORIDE AND EPINEPHRINE 2.5; 5 MG/ML; UG/ML
INJECTION, SOLUTION EPIDURAL; INFILTRATION; INTRACAUDAL; PERINEURAL PRN
Status: DISCONTINUED | OUTPATIENT
Start: 2025-06-04 | End: 2025-06-04 | Stop reason: ALTCHOICE

## 2025-06-04 RX ORDER — LIDOCAINE HYDROCHLORIDE 20 MG/ML
INJECTION, SOLUTION EPIDURAL; INFILTRATION; INTRACAUDAL; PERINEURAL
Status: DISCONTINUED | OUTPATIENT
Start: 2025-06-04 | End: 2025-06-04 | Stop reason: SDUPTHER

## 2025-06-04 RX ORDER — SODIUM CHLORIDE 9 MG/ML
INJECTION, SOLUTION INTRAVENOUS
Status: DISCONTINUED | OUTPATIENT
Start: 2025-06-04 | End: 2025-06-04 | Stop reason: SDUPTHER

## 2025-06-04 RX ORDER — MIDAZOLAM HYDROCHLORIDE 1 MG/ML
INJECTION, SOLUTION INTRAMUSCULAR; INTRAVENOUS
Status: DISCONTINUED | OUTPATIENT
Start: 2025-06-04 | End: 2025-06-04 | Stop reason: SDUPTHER

## 2025-06-04 RX ORDER — OXYCODONE AND ACETAMINOPHEN 5; 325 MG/1; MG/1
1 TABLET ORAL EVERY 6 HOURS PRN
Qty: 20 TABLET | Refills: 0 | Status: SHIPPED | OUTPATIENT
Start: 2025-06-04 | End: 2025-06-09

## 2025-06-04 RX ORDER — LABETALOL HYDROCHLORIDE 5 MG/ML
5 INJECTION, SOLUTION INTRAVENOUS
Status: DISCONTINUED | OUTPATIENT
Start: 2025-06-04 | End: 2025-06-04 | Stop reason: HOSPADM

## 2025-06-04 RX ORDER — PHENYLEPHRINE HCL IN 0.9% NACL 1 MG/10 ML
SYRINGE (ML) INTRAVENOUS
Status: DISCONTINUED | OUTPATIENT
Start: 2025-06-04 | End: 2025-06-04 | Stop reason: SDUPTHER

## 2025-06-04 RX ORDER — HYDRALAZINE HYDROCHLORIDE 20 MG/ML
5 INJECTION INTRAMUSCULAR; INTRAVENOUS
Status: DISCONTINUED | OUTPATIENT
Start: 2025-06-04 | End: 2025-06-04 | Stop reason: HOSPADM

## 2025-06-04 RX ORDER — PROCHLORPERAZINE EDISYLATE 5 MG/ML
5 INJECTION INTRAMUSCULAR; INTRAVENOUS
Status: DISCONTINUED | OUTPATIENT
Start: 2025-06-04 | End: 2025-06-04 | Stop reason: HOSPADM

## 2025-06-04 RX ORDER — SODIUM CHLORIDE 9 MG/ML
INJECTION, SOLUTION INTRAVENOUS PRN
Status: DISCONTINUED | OUTPATIENT
Start: 2025-06-04 | End: 2025-06-04 | Stop reason: HOSPADM

## 2025-06-04 RX ORDER — FENTANYL CITRATE 50 UG/ML
INJECTION, SOLUTION INTRAMUSCULAR; INTRAVENOUS
Status: DISCONTINUED | OUTPATIENT
Start: 2025-06-04 | End: 2025-06-04 | Stop reason: SDUPTHER

## 2025-06-04 RX ORDER — OXYCODONE HYDROCHLORIDE 5 MG/1
5 TABLET ORAL ONCE
Refills: 0 | Status: COMPLETED | OUTPATIENT
Start: 2025-06-04 | End: 2025-06-04

## 2025-06-04 RX ORDER — METHOCARBAMOL 100 MG/ML
1000 INJECTION, SOLUTION INTRAMUSCULAR; INTRAVENOUS SEE ADMIN INSTRUCTIONS
Status: COMPLETED | OUTPATIENT
Start: 2025-06-04 | End: 2025-06-04

## 2025-06-04 RX ADMIN — HYDROMORPHONE HYDROCHLORIDE 0.5 MG: 1 INJECTION, SOLUTION INTRAMUSCULAR; INTRAVENOUS; SUBCUTANEOUS at 13:36

## 2025-06-04 RX ADMIN — PROPOFOL 200 MG: 10 INJECTION, EMULSION INTRAVENOUS at 11:57

## 2025-06-04 RX ADMIN — METHOCARBAMOL 1000 MG: 100 INJECTION INTRAMUSCULAR; INTRAVENOUS at 13:07

## 2025-06-04 RX ADMIN — HYDROMORPHONE HYDROCHLORIDE 1 MG: 2 INJECTION, SOLUTION INTRAMUSCULAR; INTRAVENOUS; SUBCUTANEOUS at 12:10

## 2025-06-04 RX ADMIN — INDOCYANINE GREEN AND WATER 5 MG: KIT at 09:03

## 2025-06-04 RX ADMIN — HYDROMORPHONE HYDROCHLORIDE 1 MG: 2 INJECTION, SOLUTION INTRAMUSCULAR; INTRAVENOUS; SUBCUTANEOUS at 12:15

## 2025-06-04 RX ADMIN — MIDAZOLAM 2 MG: 1 INJECTION INTRAMUSCULAR; INTRAVENOUS at 11:50

## 2025-06-04 RX ADMIN — Medication 100 MCG: at 12:28

## 2025-06-04 RX ADMIN — METOCLOPRAMIDE 10 MG: 5 INJECTION, SOLUTION INTRAMUSCULAR; INTRAVENOUS at 09:03

## 2025-06-04 RX ADMIN — HYDRALAZINE HYDROCHLORIDE 10 MG: 20 INJECTION INTRAMUSCULAR; INTRAVENOUS at 12:18

## 2025-06-04 RX ADMIN — SODIUM CHLORIDE, POTASSIUM CHLORIDE, SODIUM LACTATE AND CALCIUM CHLORIDE: 600; 310; 30; 20 INJECTION, SOLUTION INTRAVENOUS at 12:36

## 2025-06-04 RX ADMIN — DEXAMETHASONE SODIUM PHOSPHATE 10 MG: 4 INJECTION, SOLUTION INTRAMUSCULAR; INTRAVENOUS at 12:03

## 2025-06-04 RX ADMIN — FENTANYL CITRATE 100 MCG: 50 INJECTION, SOLUTION INTRAMUSCULAR; INTRAVENOUS at 11:57

## 2025-06-04 RX ADMIN — OXYCODONE 5 MG: 5 TABLET ORAL at 14:15

## 2025-06-04 RX ADMIN — ROCURONIUM BROMIDE 50 MG: 10 INJECTION, SOLUTION INTRAVENOUS at 11:57

## 2025-06-04 RX ADMIN — HYDROMORPHONE HYDROCHLORIDE 0.5 MG: 1 INJECTION, SOLUTION INTRAMUSCULAR; INTRAVENOUS; SUBCUTANEOUS at 13:08

## 2025-06-04 RX ADMIN — Medication 20 MG: at 11:57

## 2025-06-04 RX ADMIN — ACETAMINOPHEN 1000 MG: 500 TABLET ORAL at 09:03

## 2025-06-04 RX ADMIN — ONDANSETRON 4 MG: 2 INJECTION INTRAMUSCULAR; INTRAVENOUS at 12:47

## 2025-06-04 RX ADMIN — SUGAMMADEX 200 MG: 100 INJECTION, SOLUTION INTRAVENOUS at 12:47

## 2025-06-04 RX ADMIN — SODIUM CHLORIDE, PRESERVATIVE FREE 20 MG: 5 INJECTION INTRAVENOUS at 09:03

## 2025-06-04 RX ADMIN — LIDOCAINE HYDROCHLORIDE 100 MG: 20 INJECTION, SOLUTION EPIDURAL; INFILTRATION; INTRACAUDAL; PERINEURAL at 11:57

## 2025-06-04 RX ADMIN — WATER 2000 MG: 1 INJECTION INTRAMUSCULAR; INTRAVENOUS; SUBCUTANEOUS at 11:46

## 2025-06-04 RX ADMIN — SODIUM CHLORIDE: 9 INJECTION, SOLUTION INTRAVENOUS at 11:43

## 2025-06-04 ASSESSMENT — PAIN DESCRIPTION - DESCRIPTORS
DESCRIPTORS: STABBING
DESCRIPTORS: ACHING;DISCOMFORT;CRAMPING;SORE;TENDER
DESCRIPTORS: SHARP
DESCRIPTORS: SHARP
DESCRIPTORS: SORE
DESCRIPTORS: ACHING;CRAMPING;SORE
DESCRIPTORS: ACHING;CRUSHING;DISCOMFORT

## 2025-06-04 ASSESSMENT — PAIN DESCRIPTION - LOCATION
LOCATION: ABDOMEN

## 2025-06-04 ASSESSMENT — PAIN SCALES - GENERAL
PAINLEVEL_OUTOF10: 6
PAINLEVEL_OUTOF10: 9
PAINLEVEL_OUTOF10: 10
PAINLEVEL_OUTOF10: 6
PAINLEVEL_OUTOF10: 10

## 2025-06-04 ASSESSMENT — PAIN DESCRIPTION - PAIN TYPE
TYPE: SURGICAL PAIN

## 2025-06-04 ASSESSMENT — PAIN DESCRIPTION - ORIENTATION
ORIENTATION: RIGHT
ORIENTATION: RIGHT;UPPER
ORIENTATION: RIGHT;UPPER
ORIENTATION: RIGHT

## 2025-06-04 ASSESSMENT — PAIN - FUNCTIONAL ASSESSMENT
PAIN_FUNCTIONAL_ASSESSMENT: 0-10
PAIN_FUNCTIONAL_ASSESSMENT: 0-10

## 2025-06-04 NOTE — ANESTHESIA POSTPROCEDURE EVALUATION
Department of Anesthesiology  Postprocedure Note    Patient: Shahbaz Montgomery  MRN: [id number]  YOB: 1975  Date of evaluation: 6/4/2025    Procedure Summary       Date: 06/04/25 Room / Location: [address]    Anesthesia Start: 1146 Anesthesia Stop: 1259    Procedure: LAPAROSCOPIC ROBOTIC XI ASSISTED CHOLECYSTECTOMY (Abdomen) Diagnosis:       Dysfunctional gallbladder      (Dysfunctional gallbladder [K82.8])    Surgeons: Denver Pereyra MD Responsible Provider: Storm Valencia DO    Anesthesia Type: general ASA Status: 2            Anesthesia Type: No value filed.    Nati Phase I: Nati Score: 9    Nati Phase II:      Anesthesia Post Evaluation    Patient location during evaluation: bedside  Patient participation: complete - patient participated  Level of consciousness: awake  Pain score: 5  Airway patency: patent  Nausea & Vomiting: no vomiting and no nausea  Cardiovascular status: hemodynamically stable  Respiratory status: acceptable  Hydration status: stable  Comments: Patient seen and examined.  Progressing as expected.  No anesthetic related questions or concerns at this time.  Multimodal analgesia pain management approach  Pain management: adequate    No notable events documented.

## 2025-06-04 NOTE — TELEPHONE ENCOUNTER
Dr Pereyra office calling to let us know that the patient mentioned FMLA paperwork. They stated that if it is for the gallbladder surgery that they will do the FMLA and that if it for something else that we would have to do the FMLA for whatever else he needed. Thank you

## 2025-06-04 NOTE — H&P
ondansetron (ZOFRAN) 4 MG tablet Take 1 tablet by mouth every 8 hours as needed for Nausea 24  Yes Kayleen Flores MD   diclofenac sodium (VOLTAREN) 1 % GEL Apply 2 g topically 4 times daily  Patient taking differently: Apply 2 g topically 4 times daily As needed 25   Flavio Sim MD   ciprofloxacin-dexAMETHasone (CIPRODEX) 0.3-0.1 % otic suspension INSTILL 4 DROPS INTO AFFECTED EAR(S) TWICE A DAY FOR 7 DAYS  Patient not taking: Reported on 2025 3/24/25   Hermelindo Cleaning MD   clobetasol propionate 0.05 % LOTN lotion Apply topically 2 times daily APPLY TO AFFECTED AREA 3/26/25   Hermelindo Cleaning MD   clobetasol (OLUX) 0.05 % foam Apply topically 2 times daily. 3/12/25   Flavio Sim MD   aspirin 81 MG EC tablet Take 1 tablet by mouth daily    ProviderHermelindo MD       Allergies   Allergen Reactions    Morphine Nausea And Vomiting    Penicillins Rash       Family History   Problem Relation Age of Onset    High Blood Pressure Mother     Diabetes Father     Heart Disease Father     High Blood Pressure Father     COPD Father     Heart Attack Sister     Diabetes Brother     Diabetes Paternal Grandmother     Cancer Paternal Grandmother         Unknown    Prostate Cancer Paternal Grandfather    ; No family history of problems with anesthesia     Social History     Tobacco Use    Smoking status: Former     Current packs/day: 0.00     Average packs/day: 2.0 packs/day for 7.0 years (14.0 ttl pk-yrs)     Types: Cigarettes     Start date:      Quit date:      Years since quittin.4    Smokeless tobacco: Never   Vaping Use    Vaping status: Former    Substances: THC, CBD   Substance Use Topics    Alcohol use: Yes     Comment: rare    Drug use: Yes     Types: Marijuana (Weed)         Review of Systems   Review of Systems        PHYSICAL EXAM:    Vitals:    25 0840   BP: (!) 151/95   Pulse: 77   Resp: 18   Temp: 97.8 °F (36.6 °C)   SpO2: 95%       General appearance:  lying  in bed, NAD, A&Ox4  Neurologic: GC15, PERRL  Head: NCAT, EOMI, red conjunctiva  Neck: supple, no masses, trachea midline  Lungs: symmetric chest rise, no respiratory distress  Heart: normal rate per peripheral pulse  Abdomen: soft,  obese    LABS:    CBC  No results for input(s): \"WBC\", \"HGB\", \"HCT\", \"PLT\" in the last 72 hours.    BMP  Recent Labs     06/04/25  0850      K 4.2      CO2 29   BUN 14   CREATININE 0.7   CALCIUM 9.3       Liver Function  No results for input(s): \"AMYLASE\", \"LIPASE\", \"BILITOT\", \"BILIDIR\", \"AST\", \"ALT\", \"ALKPHOS\", \"LABALBU\" in the last 72 hours.    Invalid input(s): \"PROT\"  No results for input(s): \"LACTATE\" in the last 72 hours.  No results for input(s): \"INR\" in the last 72 hours.    Invalid input(s): \"PT\", \"PTT\"    RADIOLOGY  Relevant imaging reviewed:        ASSESSMENT:    Patient Active Problem List   Diagnosis    Eczema    Essential hypertension    Acute bilateral low back pain with left-sided sciatica    Class 2 obesity with body mass index (BMI) of 39.0 to 39.9 in adult    Closed fracture of eighth thoracic vertebra with routine healing    Erectile dysfunction    RUQ pain    Dysfunctional gallbladder       50 y.o. male with biliary dyskinesia    PLAN:    - Plan for robot-assisted laparoscopic cholecystectomy, possible intraoperative cholangiogram, possible open today    Discussed the risk, benefits and alternatives of surgery including wound infections, bleeding, scar and hernia formation and the risks of general anesthetic. The patient understands the risks and alternatives and the possibility of converting to an open procedure. All questions were answered to the patient's satisfaction and they freely signed the consent.      Taqueria Fernandez DO  General Surgery Resident, PGY-2    Electronically signed by Taqueria Fernandez DO on 6/4/25 at 11:30 AM EDT

## 2025-06-04 NOTE — OP NOTE
Operative Note     Shahbaz Montgomery  1975  [id number]       Pre-op Diagnosis:   Dysfunctional gallbladder [K82.8]     Post-op Diagnosis List:  Dysfunctional gallbladder [K82.8]       Procedure:  Procedure(s):  LAPAROSCOPIC ROBOTIC XI ASSISTED CHOLECYSTECTOMY WITH ICG CHOLANGIOGRAPHY     Surgeon:  Denver Pereyra MD    Staff:  Scrub Person First: Irasema No  Scrub Person Second: Amari Min     Anesthesia:  General     Findings: Chronic cholecystitis     EBL: less than 50      Drains: None     Specimens:  ID Type Source Tests Collected by Time Destination   A : GALLBLADDER Tissue Gallbladder SURGICAL PATHOLOGY Denver Pereyra MD 6/4/2025 1238          Complications:  * No complications entered in OR log *     Disposition of Patient:  Tolerated procedure well     CDC Wound Closure Status:  primary     Surgical Course:  Patient is a 50 y.o. male who was diagnosed with the above. Risks, benefits, and alternatives of the procedure, including bleeding, infection, bile leak, CBD injury, possibility for open procedure/subtotal cholecystectomy, possibility for future procedures were discussed with the patient. The planned procedure was agreed to and informed consent was obtained.          After informed consent was obtained patient brought to operating table placed in supine position.  General anesthesia was induced.  Patient is prepped and draped in usual sterile fashion.  Time-out was performed identifying correct patient procedure.  SCDs were on and functional.  Patient received appropriate perioperative antibiotics.  Left upper quadrant 8mm incision was made and Veress needle was inserted.  Pneumoperitoneum was then induced which the patient tolerated. 8mm robotic optical entry port was used to visualize the abdominal wall layers with entry into the abdomen.  8mm midline supraumblical incision was made and robotic 8mm trocar was placed under direct visualization.   Two 8mm incisions were made in the RUQ.   8mm trocars were placed under direct visualization.  All ports were placed under visualization without any injury the abdominal contents. The robot was then brought up and docked to the patient. The camera and working instruments were inserted. The gallbladder was identified and grasped and elevated superiorly over the liver. It appeared grossly a chronic cholecystitis.  Adhesions and omentum were removed from the gallbladder with blunt and sharp dissection.  The transverse colon was adhered and there was a small less than 1 cm serosal tear noted from removing the colon from the gallbladder wall.  This was oversewn with a 3-0 Vicryl suture.  Cautery is used to make incision in peritoneum overlying the gallbladder.  This was carried medially and laterally of the gallbladder to free the cystic plate. ICG and firefly was used to aid with structure identification. Flow was noted in gallbladder and duodenum, confirming flow. Pictures were taken, interpreted and placed in the chart.  Blunt dissection and sparse electrocautery were used identify the cystic duct and cystic artery.  These were circumferentially dissected along their course.  The critical view was obtained. The cystic duct was clipped x3 proximally and x1 distally. This was divided with scissors.  Cystic artery was also clipped and divided. Electrocautery and blunt dissection were used to dissect the gallbladder off the liver bed.  Hemostasis ensured with electrocautery.  Clips were again visualized and remained intact without any evidence of bile or bleeding.  Gallbladder was placed in a bag and removed via the LUQ port site and sent for specimen.  The port sites were closed with 4-0 suture in subcuticular fashion.  Derma bond was applied.  Patient tolerated procedure well. All counts were correct.     Denver Pereyra MD  06/04/25  12:42 PM

## 2025-06-04 NOTE — TELEPHONE ENCOUNTER
The [facility] paperwork is for the gallbladder surgery. They can fill it then. If they face any issue with that, we would appreciate updating us so that we can do it immediatly instead. Thank you

## 2025-06-05 ENCOUNTER — TELEPHONE (OUTPATIENT)
Dept: SURGERY | Age: 50
End: 2025-06-05

## 2025-06-05 NOTE — TELEPHONE ENCOUNTER
Patient's girlfriend dropped off [place] paperwork.  Since she is not on his communication form, office could not review information with her.  She stated that she would bring patient back that afternoon to answer any questions and to have patient add her to the form.  Patient did not stop in to [place] office.  Called patient today due to some discrepancies on the paperwork. Can not leave msg on his phone.  Left message of daughter's phone.  Can not fax paperwork due to questions and can not get hold of patient.

## 2025-06-06 NOTE — TELEPHONE ENCOUNTER
Spoke to daughter Tara.  She does not have communications with her father.  Called patient, was able to leave voicemail regarding paperwork.

## 2025-06-11 ENCOUNTER — OFFICE VISIT (OUTPATIENT)
Dept: FAMILY MEDICINE CLINIC | Age: 50
End: 2025-06-11
Payer: COMMERCIAL

## 2025-06-11 VITALS
BODY MASS INDEX: 30.51 KG/M2 | OXYGEN SATURATION: 98 % | HEART RATE: 95 BPM | DIASTOLIC BLOOD PRESSURE: 85 MMHG | HEIGHT: 69 IN | SYSTOLIC BLOOD PRESSURE: 128 MMHG | TEMPERATURE: 98.1 F | WEIGHT: 206 LBS | RESPIRATION RATE: 18 BRPM

## 2025-06-11 DIAGNOSIS — M62.830 BACK MUSCLE SPASM: Primary | ICD-10-CM

## 2025-06-11 LAB — SURGICAL PATHOLOGY REPORT: NORMAL

## 2025-06-11 PROCEDURE — 3079F DIAST BP 80-89 MM HG: CPT

## 2025-06-11 PROCEDURE — 99213 OFFICE O/P EST LOW 20 MIN: CPT

## 2025-06-11 PROCEDURE — 1036F TOBACCO NON-USER: CPT

## 2025-06-11 PROCEDURE — G8427 DOCREV CUR MEDS BY ELIG CLIN: HCPCS

## 2025-06-11 PROCEDURE — 3017F COLORECTAL CA SCREEN DOC REV: CPT

## 2025-06-11 PROCEDURE — G8417 CALC BMI ABV UP PARAM F/U: HCPCS

## 2025-06-11 PROCEDURE — 3074F SYST BP LT 130 MM HG: CPT

## 2025-06-11 RX ORDER — LIDOCAINE 50 MG/G
1 PATCH TOPICAL DAILY
Qty: 10 PATCH | Refills: 0 | Status: SHIPPED | OUTPATIENT
Start: 2025-06-11 | End: 2025-06-21

## 2025-06-11 NOTE — PROGRESS NOTES
S: 50 y.o. male with   Chief Complaint   Patient presents with    Back Pain     5/10 pain today, but feeling much better overall.        Chronic back pain states that he is feeling better with Voltaren and lidocaine patches    O: VS:  height is 1.753 m (5' 9\") and weight is 93.4 kg (206 lb). His temperature is 98.1 °F (36.7 °C). His blood pressure is 128/85 and his pulse is 95. His respiration is 18 and oxygen saturation is 98%.   BP Readings from Last 3 Encounters:   06/11/25 128/85   06/04/25 (!) 157/69   05/07/25 128/78     See resident note      Impression/Plan:   1) chronic back pain improved with gabapentin Voltaren and lidocaine patches will refill-she will see physical therapy later          Health Maintenance Due   Topic Date Due    Shingles vaccine (1 of 2) Never done    Pneumococcal 50+ years Vaccine (1 of 1 - PCV) Never done    COVID-19 Vaccine (4 - 2024-25 season) 09/01/2024    Hepatitis B vaccine (2 of 3 - 19+ 3-dose series) 10/28/2024         Attending Physician Statement  I have discussed the case, including pertinent history and exam findings with the resident. I agree with the documented assessment and plan.      Sanya Fields MD  
  Social History     Socioeconomic History    Marital status: Single     Spouse name: Not on file    Number of children: Not on file    Years of education: Not on file    Highest education level: Not on file   Occupational History    Not on file   Tobacco Use    Smoking status: Former     Current packs/day: 0.00     Average packs/day: 2.0 packs/day for 7.0 years (14.0 ttl pk-yrs)     Types: Cigarettes     Start date:      Quit date:      Years since quittin.4    Smokeless tobacco: Never   Vaping Use    Vaping status: Former    Substances: THC, CBD   Substance and Sexual Activity    Alcohol use: Yes     Comment: rare    Drug use: Yes     Types: Marijuana (Weed)    Sexual activity: Defer   Other Topics Concern    Not on file   Social History Narrative    Not on file     Social Drivers of Health     Financial Resource Strain: Low Risk  (2024)    Overall Financial Resource Strain (CARDIA)     Difficulty of Paying Living Expenses: Not hard at all   Food Insecurity: Food Insecurity Present (2025)    Hunger Vital Sign     Worried About Running Out of Food in the Last Year: Sometimes true     Ran Out of Food in the Last Year: Sometimes true   Transportation Needs: Unmet Transportation Needs (2025)    PRAPARE - Transportation     Lack of Transportation (Medical): Yes     Lack of Transportation (Non-Medical): Yes   Physical Activity: Not on file   Stress: Not on file   Social Connections: Not on file   Intimate Partner Violence: Not on file   Housing Stability: Patient Declined (2025)    Housing Stability Vital Sign     Unable to Pay for Housing in the Last Year: Patient declined     Number of Times Moved in the Last Year: Not on file     Homeless in the Last Year: Patient declined      Family History:       Problem Relation Age of Onset    High Blood Pressure Mother     Diabetes Father     Heart Disease Father     High Blood Pressure Father     COPD Father     Heart Attack Sister     Diabetes 
ISS, hypoglycemia protocol, A1c

## 2025-06-17 ENCOUNTER — OFFICE VISIT (OUTPATIENT)
Dept: SURGERY | Age: 50
End: 2025-06-17

## 2025-06-17 VITALS
WEIGHT: 208 LBS | OXYGEN SATURATION: 97 % | BODY MASS INDEX: 30.81 KG/M2 | DIASTOLIC BLOOD PRESSURE: 77 MMHG | HEIGHT: 69 IN | HEART RATE: 65 BPM | RESPIRATION RATE: 18 BRPM | SYSTOLIC BLOOD PRESSURE: 111 MMHG

## 2025-06-17 DIAGNOSIS — Z90.49 STATUS POST CHOLECYSTECTOMY: Primary | ICD-10-CM

## 2025-06-17 DIAGNOSIS — Z12.11 ENCOUNTER FOR SCREENING COLONOSCOPY: ICD-10-CM

## 2025-06-17 PROCEDURE — 99024 POSTOP FOLLOW-UP VISIT: CPT | Performed by: SURGERY

## 2025-06-21 NOTE — PROGRESS NOTES
[facility] Surgery Clinic Note    Assessment & Plan  1. Post-cholecystectomy status.  Significant improvement in symptoms is reported following gallbladder surgery. Pathology results were normal.    2. Colon cancer screening.  Completed a Cologuard test in 2024, which was negative. A colonoscopy is recommended for more comprehensive screening, as Cologuard may miss some polyps. The colonoscopy will be scheduled for the fall. Risks, benefits, and alternatives of colonoscopy versus Cologuard were discussed. Colonoscopy has a higher detection rate compared to Cologuard.    Return for Colonoscopy.    Shahbaz was seen today for follow-up.    Diagnoses and all orders for this visit:    Status post cholecystectomy    Encounter for screening colonoscopy          Chief Complaint   Patient presents with    Follow-up     2 week post op lap robotic kenna 6/4/25       PCP: Flavio Sim MD  CC: No ref. provider found     Shahbaz Montgomery is a 50 y.o. male .    History of Present Illness  The patient presents for evaluation of abdominal pain.    He reports a significant improvement in his condition, with the resolution of the pain that was present prior to his gallbladder surgery. He underwent a stress test for cardiac evaluation, which delayed his gallbladder surgery. He expresses a desire to fully recover before resuming work. His bowel movements are regular, although he occasionally experiences mild stomach discomfort and acid reflux. He has not undergone a colonoscopy but did complete a Cologuard test last year.    SOCIAL HISTORY  Occupations: The patient was concerned about taking time off work due to health issues.  Tobacco: The patient quit smoking cigarettes.  Recreational Drugs: The patient smokes marijuana occasionally.    FAMILY HISTORY  - Negative for colon cancer in first-degree relatives       Results  Labs   - Pathology from gallbladder surgery: Normal    Diagnostic Testing   - Upper endoscopy: 10/2024, Mild

## 2025-06-27 ENCOUNTER — TELEPHONE (OUTPATIENT)
Dept: SURGERY | Age: 50
End: 2025-06-27

## 2025-06-27 ENCOUNTER — PREP FOR PROCEDURE (OUTPATIENT)
Dept: SURGERY | Age: 50
End: 2025-06-27

## 2025-06-27 DIAGNOSIS — Z12.11 COLON CANCER SCREENING: ICD-10-CM

## 2025-06-27 NOTE — TELEPHONE ENCOUNTER
Shahbaz Montgomery is scheduled for colonoscopy with Dr Pereyra on 11- at [facility]. Patient needs to be NPO after midnight the night before procedure. All surgery instructions were explained to the patient and a surgery letter was also mailed out. MA informed patient that PAT will also be calling to review pre-op instructions and medications. Patient verbalized understanding.  Electronically signed by Camila Alvarez MA on 6/27/2025 at 6:22 AM

## 2025-06-30 DIAGNOSIS — M54.6 CHRONIC BILATERAL THORACIC BACK PAIN: ICD-10-CM

## 2025-06-30 DIAGNOSIS — G89.29 CHRONIC BILATERAL THORACIC BACK PAIN: ICD-10-CM

## 2025-06-30 DIAGNOSIS — Z76.0 MEDICATION REFILL: ICD-10-CM

## 2025-07-01 ENCOUNTER — OFFICE VISIT (OUTPATIENT)
Dept: FAMILY MEDICINE CLINIC | Age: 50
End: 2025-07-01
Payer: COMMERCIAL

## 2025-07-01 VITALS
SYSTOLIC BLOOD PRESSURE: 120 MMHG | DIASTOLIC BLOOD PRESSURE: 82 MMHG | HEIGHT: 69 IN | BODY MASS INDEX: 30.04 KG/M2 | RESPIRATION RATE: 18 BRPM | HEART RATE: 100 BPM | WEIGHT: 202.8 LBS | TEMPERATURE: 98.9 F | OXYGEN SATURATION: 98 %

## 2025-07-01 DIAGNOSIS — M62.830 BACK MUSCLE SPASM: ICD-10-CM

## 2025-07-01 DIAGNOSIS — G89.29 CHRONIC BILATERAL THORACIC BACK PAIN: Primary | ICD-10-CM

## 2025-07-01 DIAGNOSIS — M54.6 CHRONIC BILATERAL THORACIC BACK PAIN: Primary | ICD-10-CM

## 2025-07-01 PROCEDURE — 1036F TOBACCO NON-USER: CPT

## 2025-07-01 PROCEDURE — 3079F DIAST BP 80-89 MM HG: CPT

## 2025-07-01 PROCEDURE — G8417 CALC BMI ABV UP PARAM F/U: HCPCS

## 2025-07-01 PROCEDURE — 99213 OFFICE O/P EST LOW 20 MIN: CPT

## 2025-07-01 PROCEDURE — G8427 DOCREV CUR MEDS BY ELIG CLIN: HCPCS

## 2025-07-01 PROCEDURE — 3017F COLORECTAL CA SCREEN DOC REV: CPT

## 2025-07-01 PROCEDURE — 3074F SYST BP LT 130 MM HG: CPT

## 2025-07-01 RX ORDER — GABAPENTIN 600 MG/1
600 TABLET ORAL 4 TIMES DAILY PRN
Qty: 120 TABLET | Refills: 0 | Status: SHIPPED | OUTPATIENT
Start: 2025-07-01 | End: 2025-07-31

## 2025-07-01 RX ORDER — GABAPENTIN 800 MG/1
800 TABLET ORAL 4 TIMES DAILY
Qty: 120 TABLET | Refills: 0 | OUTPATIENT
Start: 2025-07-01 | End: 2025-07-31

## 2025-07-01 NOTE — PROGRESS NOTES
[facility]  Department of Family Medicine  Family Medicine Residency Program      Patient: Shahbaz Montgomery 50 y.o. male     Date of Service: 7/1/25      Chief complaint:   Chief Complaint   Patient presents with    Back Pain     Discuss Gabapentin dosing, 400 mg not effective     Letter for School/Work     Would like to return to work 7/7       HISTORY OF PRESENTING ILLNESS     50 y.o. male with a significant PMHx of HTN, and chronic lower back pain presented to the clinic for back pain     Back pain  He has been having it since 2007.It is located in the lumbar spine and thoracic spine. He rates it as 5/10 today.  Worse with movement. Gets better with Voltaren, Lidocaine patch, and Gabapentin. He thinks that Gabapentin at the current dose of 400 mg 4 times daily is not helping enough. He denies any fever, weakness, or difficulty walking. He had a thoracic spine CT in 2022 that showed chronic compression deformities. Unchanged from 2010.    Return to work  He had a cholecystectomy at the 4th of June 2025.  The patient has been recovering well.  He denies having any symptoms since having the surgery.  His laparoscopic wounds are recovering well.  He would like to return to work on July 7.    Review of Systems:   Review of Systems   All other systems reviewed and are negative.    Health Maintenance:  Health Maintenance Due   Topic Date Due    Shingles vaccine (1 of 2) Never done    Pneumococcal 50+ years Vaccine (1 of 1 - PCV) Never done    COVID-19 Vaccine (4 - 2024-25 season) 09/01/2024    Hepatitis B vaccine (2 of 3 - 19+ 3-dose series) 10/28/2024     Past Medical History:      Diagnosis Date    Arthritis     Chronic back pain 08/02/2011    Eczema     Essential hypertension     GERD (gastroesophageal reflux disease)     Hyperlipidemia      Past Surgical History:        Procedure Laterality Date    ANKLE SURGERY Right 01/01/2009    CHOLECYSTECTOMY N/A 6/4/2025    LAPAROSCOPIC ROBOTIC XI

## 2025-07-01 NOTE — PROGRESS NOTES
[facility]  Precepting Note    Subjective:  Back pain  Chronic  Thoracic and lumbar  Worse with movement.   Voltaren, lidocaine, and gbp helpos.    QID.  Tried lowering and doesn't feel lit is helping as well.   Chronic compression Fxs in thoracic since at least 2010.   No red fla symptoms.     He would like release to work.   He had cholecystectomy June 4th.   Wounds healing. Pain improved.   Seems back to baseline state of health.   Surgeon excused until July 14th, He'd like to return on the 7th.       ROS otherwise negative     Past medical, surgical, family and social history were reviewed, non-contributory, and unchanged unless otherwise stated.    Objective:    /82   Pulse 100   Temp 98.9 °F (37.2 °C) (Temporal)   Resp 18   Ht 1.753 m (5' 9\")   Wt 92 kg (202 lb 12.8 oz)   SpO2 98%   BMI 29.95 kg/m²     Exam is as noted by resident with the following changes, additions or corrections:    General:  NAD; alert & oriented x 3   Heart:  RRR, no murmurs, gallops, or rubs.  Lungs:  CTA bilaterally, no wheeze, rales or rhonchi  Abd: bowel sounds present, nontender, nondistended, no masses  Extrem:  No clubbing, cyanosis, or edema  Midline thoracic tenderness, mild in lumbar.   No weakness or sensory deficits.   Abd exam normal with well healing laparoscopic scars.     Assessment/Plan:  Chronic back pain, Hx compression fractures.   Inc GBP back toward previous dose as now less effective and no side effects at higher dose.    Consider DEXA scan.     OK to RTW next week given feels much better and abd healing well.        Attending Physician Statement  I have reviewed the chart, including any radiology or labs. I have discussed the case, including pertinent history and exam findings with the resident.  I agree with the assessment, plan and orders as documented by the resident.  Please refer to the resident note for additional information.      Electronically

## 2025-07-08 ENCOUNTER — TELEPHONE (OUTPATIENT)
Dept: PHYSICAL THERAPY | Age: 50
End: 2025-07-08

## 2025-07-10 DIAGNOSIS — I25.2 HISTORY OF MI (MYOCARDIAL INFARCTION): ICD-10-CM

## 2025-07-11 RX ORDER — ATORVASTATIN CALCIUM 10 MG/1
10 TABLET, FILM COATED ORAL DAILY
Qty: 90 TABLET | Refills: 1 | Status: SHIPPED | OUTPATIENT
Start: 2025-07-11

## 2025-07-25 DIAGNOSIS — G89.29 CHRONIC BILATERAL THORACIC BACK PAIN: ICD-10-CM

## 2025-07-25 DIAGNOSIS — M54.6 CHRONIC BILATERAL THORACIC BACK PAIN: ICD-10-CM

## 2025-07-25 NOTE — TELEPHONE ENCOUNTER
Refill request    Gabapentin 600mg    [facility] [address]     Last Appointment   7/1/2025  Next Appointment  9/11/2025

## 2025-07-25 NOTE — TELEPHONE ENCOUNTER
Name of Medication(s) Requested:  Requested Prescriptions     Pending Prescriptions Disp Refills    gabapentin (NEURONTIN) 600 MG tablet 120 tablet 0     Sig: Take 1 tablet by mouth 4 times daily as needed (back pain) for up to 30 days.       Medication is on current medication list Yes    Dosage and directions were verified? Yes    Quantity verified: 30 day supply     Pharmacy Verified?  Yes    Last Appointment:  7/1/2025    Future appts:  Future Appointments   Date Time Provider Department Center   8/1/2025  8:30 AM Riley Rojas PT [facility] HOD   9/11/2025  2:00 PM Flavio Sim MD [facility] DEP   11/25/2025  2:30 PM Denver Pereyra MD [facility]        (If no appt send self scheduling link. .REFILLAPPT)  Scheduling request sent?     [] Yes  [x] No    Does patient need updated?  [] Yes  [x] No

## 2025-07-27 PROBLEM — Z12.11 COLON CANCER SCREENING: Status: RESOLVED | Noted: 2025-06-27 | Resolved: 2025-07-27

## 2025-07-29 RX ORDER — GABAPENTIN 600 MG/1
600 TABLET ORAL 4 TIMES DAILY PRN
Qty: 120 TABLET | Refills: 0 | Status: SHIPPED | OUTPATIENT
Start: 2025-07-29 | End: 2025-08-28

## 2025-08-01 ENCOUNTER — HOSPITAL ENCOUNTER (OUTPATIENT)
Dept: PHYSICAL THERAPY | Age: 50
Setting detail: THERAPIES SERIES
Discharge: HOME | End: 2025-08-01
Payer: COMMERCIAL

## 2025-08-01 DIAGNOSIS — G89.29 CHRONIC BILATERAL THORACIC BACK PAIN: Primary | ICD-10-CM

## 2025-08-01 DIAGNOSIS — M54.6 CHRONIC BILATERAL THORACIC BACK PAIN: Primary | ICD-10-CM

## 2025-08-01 PROCEDURE — G0283 ELEC STIM OTHER THAN WOUND: HCPCS | Performed by: PHYSICAL THERAPIST

## 2025-08-01 PROCEDURE — 97161 PT EVAL LOW COMPLEX 20 MIN: CPT | Performed by: PHYSICAL THERAPIST

## 2025-08-01 NOTE — THERAPY EVALUATION
[facility]  Phone: [phone] Fax: [phone]            Date:  2025   Patient: Shahbaz Montgomery  : 1975  MRN: [id number]  Referring Provider: Flavio Sim MD  [address] [place] [address]     Medical Diagnosis:     M54.6, G89.29 (ICD-10-CM) - Chronic bilateral thoracic back pain     SUBJECTIVE:     Onset date:     Onset: Sudden onset    Mechanism of Injury: Pt reports that he fell off of a roof in  resulting in rib fractures and compression fracture at t8. He reports that he did not heal right and continues to have issues. He reports that pain has worsened over the last 4 months. He reports that prolonged standing incr his pain. He reports that icy hot, epsom salts, and heat helps reduce pain.    Previous PT: none    Medical Management for Current Problem: marijuana card    Chief complaint: pain, morning pain / stiffness, pain that worsens as day wears on , edema, decreased motion, decreased mobility, weakness, inability / limited ability to use leg    Behavior: condition is waxing / waning    Pain: waxing and waning  Current: 8/10     Best: 4/10     Worst:9/10    Symptom Type/Quality: sharp, stabbing  Location:: Back: lumbar region, thoracic region, right lateral side, and left lateral side does not radiate         Provoking Activities/Positions: sitting, standing, prolonged sitting, prolonged standing, walking long distances, being in one position too long , bending, lifting/carrying/material handling                 Relieving Activitie/Positions: heat, icy hot    Disturbed Sleep: yes  Bladder Dysfunction: no  Bowel Dysfunction: no     Imaging results: No results found.    Past Medical History:  Past Medical History:   Diagnosis Date    Arthritis     Chronic back pain 2011    Eczema     Essential hypertension     GERD (gastroesophageal reflux disease)     Hyperlipidemia      Past Surgical History:   Procedure Laterality Date    ANKLE SURGERY

## 2025-08-01 NOTE — PROGRESS NOTES
[facility]  Phone: [phone] Fax: [phone]       Physical Therapy Daily Treatment Note  Date:  2025    Patient Name:  Shahbaz Montgomery    :  1975  MRN: [id number]    Restrictions/Precautions:  none  Diagnosis:  M54.6  Treatment Diagnosis:  M54.6  Insurance/Certification information:  Vishnu  Referring Physician:  megan  Plan of care signed (Y/N):  N  Visit# / total visits:   Pain level: 6/10  Time In:      832  Time Out:  912    Subjective:  see eval    Exercises:  Exercise/Equipment Resistance/Repetitions Other comments                                                                                                                                             Other Therapeutic Activities:      Home Exercise Program:      Manual Treatments:      Modalities:  IFC c MHP x 20 min     Timed Code Treatment Minutes:      Total Treatment Minutes:      Treatment/Activity Tolerance:  [x] Patient tolerated treatment well [] Patient limited by fatigue  [] Patient limited by pain  [] Patient limited by other medical complications  [x] Other:     Plan:   [x] Continue per plan of care [] Alter current plan (see comments)  [] Plan of care initiated [] Hold pending MD visit [] Discharge  Plan for Next Session:         Treatment Charges: Mins Units   Initial Evaluation 20 1   Re-Evaluation     Ther Exercise         TE     Manual Therapy     MT     Ther Activities        TA     Gait Training          GT     Neuro Re-education NR     Modalities 20 1   Non-Billable Service Time     Other     Total Time/Units 40 2     Electronically signed by:  Riley Rojas PT

## 2025-08-05 ENCOUNTER — HOSPITAL ENCOUNTER (OUTPATIENT)
Dept: PHYSICAL THERAPY | Age: 50
Setting detail: THERAPIES SERIES
End: 2025-08-05

## 2025-08-07 ENCOUNTER — OFFICE VISIT (OUTPATIENT)
Dept: FAMILY MEDICINE CLINIC | Age: 50
End: 2025-08-07

## 2025-08-07 VITALS
BODY MASS INDEX: 30.66 KG/M2 | OXYGEN SATURATION: 98 % | DIASTOLIC BLOOD PRESSURE: 97 MMHG | WEIGHT: 207 LBS | RESPIRATION RATE: 15 BRPM | SYSTOLIC BLOOD PRESSURE: 137 MMHG | TEMPERATURE: 97.5 F | HEIGHT: 69 IN | HEART RATE: 85 BPM

## 2025-08-07 DIAGNOSIS — M54.2 NECK PAIN: Primary | ICD-10-CM

## 2025-08-07 DIAGNOSIS — S32.000S COMPRESSION FRACTURE OF LUMBAR VERTEBRA, UNSPECIFIED LUMBAR VERTEBRAL LEVEL, SEQUELA: ICD-10-CM

## 2025-08-07 DIAGNOSIS — M54.6 CHRONIC BILATERAL THORACIC BACK PAIN: ICD-10-CM

## 2025-08-07 DIAGNOSIS — G89.29 CHRONIC BILATERAL THORACIC BACK PAIN: ICD-10-CM

## 2025-08-07 DIAGNOSIS — S22.000A COMPRESSION FRACTURE OF BODY OF THORACIC VERTEBRA (HCC): ICD-10-CM

## 2025-08-07 ASSESSMENT — ENCOUNTER SYMPTOMS
SHORTNESS OF BREATH: 0
BLOOD IN STOOL: 0
ABDOMINAL PAIN: 0
VOMITING: 0
COUGH: 0
DIARRHEA: 0
CONSTIPATION: 0
NAUSEA: 0

## 2025-08-18 ENCOUNTER — HOSPITAL ENCOUNTER (OUTPATIENT)
Dept: PHYSICAL THERAPY | Age: 50
Setting detail: THERAPIES SERIES
Discharge: HOME | End: 2025-08-18

## 2025-08-18 DIAGNOSIS — M54.6 CHRONIC BILATERAL THORACIC BACK PAIN: Primary | ICD-10-CM

## 2025-08-18 DIAGNOSIS — G89.29 CHRONIC BILATERAL THORACIC BACK PAIN: Primary | ICD-10-CM

## 2025-08-18 PROCEDURE — 97110 THERAPEUTIC EXERCISES: CPT | Performed by: PHYSICAL THERAPIST

## 2025-08-18 PROCEDURE — G0283 ELEC STIM OTHER THAN WOUND: HCPCS | Performed by: PHYSICAL THERAPIST

## 2025-08-19 ENCOUNTER — APPOINTMENT (OUTPATIENT)
Dept: GENERAL RADIOLOGY | Age: 50
End: 2025-08-19
Payer: COMMERCIAL

## 2025-08-19 ENCOUNTER — HOSPITAL ENCOUNTER (EMERGENCY)
Age: 50
Discharge: HOME | End: 2025-08-19
Attending: STUDENT IN AN ORGANIZED HEALTH CARE EDUCATION/TRAINING PROGRAM
Payer: COMMERCIAL

## 2025-08-19 ENCOUNTER — APPOINTMENT (OUTPATIENT)
Dept: CT IMAGING | Age: 50
End: 2025-08-19
Payer: COMMERCIAL

## 2025-08-19 VITALS
DIASTOLIC BLOOD PRESSURE: 100 MMHG | BODY MASS INDEX: 31.1 KG/M2 | SYSTOLIC BLOOD PRESSURE: 148 MMHG | WEIGHT: 210 LBS | HEART RATE: 80 BPM | TEMPERATURE: 98.8 F | RESPIRATION RATE: 18 BRPM | OXYGEN SATURATION: 98 % | HEIGHT: 69 IN
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
## 2025-08-19 PROCEDURE — 72125 CT NECK SPINE W/O DYE: CPT

## 2025-08-19 PROCEDURE — 70450 CT HEAD/BRAIN W/O DYE: CPT

## 2025-08-19 PROCEDURE — 71046 X-RAY EXAM CHEST 2 VIEWS: CPT

## 2025-08-19 PROCEDURE — 70486 CT MAXILLOFACIAL W/O DYE: CPT

## 2025-08-19 PROCEDURE — 99284 EMERGENCY DEPT VISIT MOD MDM: CPT

## 2025-08-19 PROCEDURE — 6370000000 HC RX 637 (ALT 250 FOR IP): Performed by: STUDENT IN AN ORGANIZED HEALTH CARE EDUCATION/TRAINING PROGRAM

## 2025-08-19 RX ORDER — LIDOCAINE 50 MG/G
1 PATCH TOPICAL DAILY
Qty: 10 PATCH | Refills: 0 | Status: SHIPPED | OUTPATIENT
Start: 2025-08-19 | End: 2025-08-29

## 2025-08-19 RX ORDER — ACETAMINOPHEN 500 MG
1000 TABLET ORAL ONCE
Status: COMPLETED | OUTPATIENT
Start: 2025-08-19 | End: 2025-08-19

## 2025-08-19 RX ORDER — CYCLOBENZAPRINE HCL 5 MG
5 TABLET ORAL 2 TIMES DAILY PRN
Qty: 10 TABLET | Refills: 0 | Status: SHIPPED | OUTPATIENT
Start: 2025-08-19 | End: 2025-08-29

## 2025-08-19 RX ADMIN — ACETAMINOPHEN 1000 MG: 500 TABLET ORAL at 21:06

## 2025-08-19 ASSESSMENT — PAIN DESCRIPTION - LOCATION
LOCATION: FACE;NECK
LOCATION: NECK

## 2025-08-19 ASSESSMENT — PAIN - FUNCTIONAL ASSESSMENT
PAIN_FUNCTIONAL_ASSESSMENT: 0-10
PAIN_FUNCTIONAL_ASSESSMENT: 0-10

## 2025-08-19 ASSESSMENT — PAIN SCALES - GENERAL
PAINLEVEL_OUTOF10: 8
PAINLEVEL_OUTOF10: 8

## 2025-08-20 ENCOUNTER — OFFICE VISIT (OUTPATIENT)
Dept: FAMILY MEDICINE CLINIC | Age: 50
End: 2025-08-20
Payer: COMMERCIAL

## 2025-08-20 VITALS
HEIGHT: 69 IN | DIASTOLIC BLOOD PRESSURE: 94 MMHG | WEIGHT: 203 LBS | RESPIRATION RATE: 16 BRPM | BODY MASS INDEX: 30.07 KG/M2 | TEMPERATURE: 97.8 F | OXYGEN SATURATION: 97 % | HEART RATE: 81 BPM | SYSTOLIC BLOOD PRESSURE: 139 MMHG

## 2025-08-20 DIAGNOSIS — Z76.0 MEDICATION REFILL: ICD-10-CM

## 2025-08-20 DIAGNOSIS — M54.2 NECK PAIN: Primary | ICD-10-CM

## 2025-08-20 PROCEDURE — 3075F SYST BP GE 130 - 139MM HG: CPT

## 2025-08-20 PROCEDURE — 3080F DIAST BP >= 90 MM HG: CPT

## 2025-08-20 PROCEDURE — 99213 OFFICE O/P EST LOW 20 MIN: CPT

## 2025-08-20 RX ORDER — SILDENAFIL 50 MG/1
50 TABLET, FILM COATED ORAL DAILY PRN
Qty: 30 TABLET | Refills: 2 | Status: CANCELLED | OUTPATIENT
Start: 2025-08-20

## 2025-08-20 RX ORDER — SILDENAFIL 50 MG/1
50 TABLET, FILM COATED ORAL DAILY PRN
Qty: 30 TABLET | Refills: 2 | Status: SHIPPED | OUTPATIENT
Start: 2025-08-20

## 2025-08-20 RX ORDER — CLOBETASOL PROPIONATE 0.5 MG/G
AEROSOL, FOAM TOPICAL
Qty: 100 G | Refills: 1 | Status: CANCELLED | OUTPATIENT
Start: 2025-08-20

## 2025-08-20 RX ORDER — GABAPENTIN 600 MG/1
600 TABLET ORAL 4 TIMES DAILY PRN
Qty: 120 TABLET | Refills: 0 | Status: CANCELLED | OUTPATIENT
Start: 2025-08-20 | End: 2025-09-19

## 2025-08-20 RX ORDER — LIDOCAINE 4 G/G
1 PATCH TOPICAL DAILY
Qty: 15 EACH | Refills: 0 | Status: SHIPPED | OUTPATIENT
Start: 2025-08-20

## 2025-08-20 ASSESSMENT — ENCOUNTER SYMPTOMS
NAUSEA: 0
CHEST TIGHTNESS: 0
SHORTNESS OF BREATH: 0
DIARRHEA: 0
VOMITING: 0

## 2025-09-05 ENCOUNTER — APPOINTMENT (OUTPATIENT)
Dept: GENERAL RADIOLOGY | Age: 50
End: 2025-09-05

## 2025-09-05 ENCOUNTER — HOSPITAL ENCOUNTER (EMERGENCY)
Age: 50
Discharge: HOME | End: 2025-09-05
Attending: EMERGENCY MEDICINE

## 2025-09-05 VITALS
HEIGHT: 69 IN | HEART RATE: 83 BPM | DIASTOLIC BLOOD PRESSURE: 85 MMHG | WEIGHT: 205 LBS | SYSTOLIC BLOOD PRESSURE: 137 MMHG | BODY MASS INDEX: 30.36 KG/M2 | RESPIRATION RATE: 20 BRPM | OXYGEN SATURATION: 98 % | TEMPERATURE: 98.3 F

## 2025-09-05 DIAGNOSIS — R07.9 CHEST PAIN, UNSPECIFIED TYPE: Primary | ICD-10-CM

## 2025-09-05 LAB
ALBUMIN SERPL-MCNC: 3.7 G/DL (ref 3.5–5.2)
ALP SERPL-CCNC: 71 U/L (ref 40–129)
ALT SERPL-CCNC: 20 U/L (ref 0–50)
ANION GAP SERPL CALCULATED.3IONS-SCNC: 10 MMOL/L (ref 7–16)
AST SERPL-CCNC: 20 U/L (ref 0–50)
BASOPHILS # BLD: 0.04 K/UL (ref 0–0.2)
BASOPHILS NFR BLD: 0 % (ref 0–2)
BILIRUB SERPL-MCNC: <0.2 MG/DL (ref 0–1.2)
BUN SERPL-MCNC: 17 MG/DL (ref 6–20)
CALCIUM SERPL-MCNC: 9.5 MG/DL (ref 8.6–10)
CHLORIDE SERPL-SCNC: 103 MMOL/L (ref 98–107)
CO2 SERPL-SCNC: 27 MMOL/L (ref 22–29)
CREAT SERPL-MCNC: 1 MG/DL (ref 0.7–1.2)
EKG ATRIAL RATE: 88 BPM
EKG P AXIS: 73 DEGREES
EKG P-R INTERVAL: 124 MS
EKG Q-T INTERVAL: 344 MS
EKG QRS DURATION: 84 MS
EKG QTC CALCULATION (BAZETT): 416 MS
EKG R AXIS: 31 DEGREES
EKG T AXIS: 50 DEGREES
EKG VENTRICULAR RATE: 88 BPM
EOSINOPHIL # BLD: 0.2 K/UL (ref 0.05–0.5)
EOSINOPHILS RELATIVE PERCENT: 2 % (ref 0–6)
ERYTHROCYTE [DISTWIDTH] IN BLOOD BY AUTOMATED COUNT: 13.2 % (ref 11.5–15)
GFR, ESTIMATED: >90 ML/MIN/1.73M2
GLUCOSE SERPL-MCNC: 105 MG/DL (ref 74–99)
HCT VFR BLD AUTO: 42.4 % (ref 37–54)
HGB BLD-MCNC: 14.4 G/DL (ref 12.5–16.5)
IMM GRANULOCYTES # BLD AUTO: 0.03 K/UL (ref 0–0.58)
IMM GRANULOCYTES NFR BLD: 0 % (ref 0–5)
LYMPHOCYTES NFR BLD: 2.3 K/UL (ref 1.5–4)
LYMPHOCYTES RELATIVE PERCENT: 21 % (ref 20–42)
MCH RBC QN AUTO: 31.8 PG (ref 26–35)
MCHC RBC AUTO-ENTMCNC: 34 G/DL (ref 32–34.5)
MCV RBC AUTO: 93.6 FL (ref 80–99.9)
MONOCYTES NFR BLD: 0.8 K/UL (ref 0.1–0.95)
MONOCYTES NFR BLD: 7 % (ref 2–12)
NEUTROPHILS NFR BLD: 70 % (ref 43–80)
NEUTS SEG NFR BLD: 7.81 K/UL (ref 1.8–7.3)
PLATELET # BLD AUTO: 291 K/UL (ref 130–450)
PMV BLD AUTO: 9 FL (ref 7–12)
POTASSIUM SERPL-SCNC: 4 MMOL/L (ref 3.5–5.1)
PROT SERPL-MCNC: 6.3 G/DL (ref 6.4–8.3)
RBC # BLD AUTO: 4.53 M/UL (ref 3.8–5.8)
SODIUM SERPL-SCNC: 140 MMOL/L (ref 136–145)
TROPONIN I SERPL HS-MCNC: 6 NG/L (ref 0–22)
TROPONIN I SERPL HS-MCNC: 8 NG/L (ref 0–22)
WBC OTHER # BLD: 11.2 K/UL (ref 4.5–11.5)

## 2025-09-05 PROCEDURE — 71045 X-RAY EXAM CHEST 1 VIEW: CPT

## 2025-09-05 PROCEDURE — 85025 COMPLETE CBC W/AUTO DIFF WBC: CPT

## 2025-09-05 PROCEDURE — 80053 COMPREHEN METABOLIC PANEL: CPT

## 2025-09-05 PROCEDURE — 93005 ELECTROCARDIOGRAM TRACING: CPT | Performed by: EMERGENCY MEDICINE

## 2025-09-05 PROCEDURE — 93010 ELECTROCARDIOGRAM REPORT: CPT | Performed by: INTERNAL MEDICINE

## 2025-09-05 PROCEDURE — 84484 ASSAY OF TROPONIN QUANT: CPT

## 2025-09-05 ASSESSMENT — PAIN - FUNCTIONAL ASSESSMENT: PAIN_FUNCTIONAL_ASSESSMENT: 0-10

## (undated) DEVICE — NEEDLE HYPO 25GA L1.5IN BLU POLYPR HUB S STL REG BVL STR

## (undated) DEVICE — WARMER SCP 2 ANTIFOG LAP DISP

## (undated) DEVICE — FORCEPS BX OVL CUP FEN DISPOSABLE CAP L 160CM RAD JAW 4

## (undated) DEVICE — SOLUTION IRRIG 1000ML 0.9% SOD CHL USP POUR PLAS BTL

## (undated) DEVICE — FORCE BIPOLAR: Brand: ENDOWRIST

## (undated) DEVICE — MEDIUM-LARGE CLIP APPLIER: Brand: ENDOWRIST

## (undated) DEVICE — BLOCK BITE 60FR RUBBER ADLT DENTAL

## (undated) DEVICE — PROGRASP FORCEPS: Brand: ENDOWRIST

## (undated) DEVICE — GLOVE,SURG,SIGNATURE LTX MICR,LTX,PF,7.0: Brand: MEDLINE

## (undated) DEVICE — Device

## (undated) DEVICE — BLADE,STAINLESS-STEEL,11,STRL,DISPOSABLE: Brand: MEDLINE

## (undated) DEVICE — BLADELESS OBTURATOR: Brand: WECK VISTA

## (undated) DEVICE — TIP COVER ACCESSORY

## (undated) DEVICE — ARM DRAPE

## (undated) DEVICE — ANCHOR TISSUE RETRIEVAL SYSTEM, BAG SIZE 125 ML, PORT SIZE 8 MM: Brand: ANCHOR TISSUE RETRIEVAL SYSTEM

## (undated) DEVICE — APPLICATOR MEDICATED 26 CC SOLUTION HI LT ORNG CHLORAPREP

## (undated) DEVICE — GRADUATE TRIANG MEASURE 1000ML BLK PRNT

## (undated) DEVICE — SYRINGE 20ML LL S/C 50

## (undated) DEVICE — KIT,ANTI FOG,W/SPONGE & FLUID,SOFT PACK: Brand: MEDLINE

## (undated) DEVICE — TOWEL,OR,DSP,ST,BLUE,STD,6/PK,12PK/CS: Brand: MEDLINE

## (undated) DEVICE — INSUFFLATION NEEDLE TO ESTABLISH PNEUMOPERITONEUM.: Brand: INSUFFLATION NEEDLE

## (undated) DEVICE — SEAL

## (undated) DEVICE — LIQUIBAND RAPID ADHESIVE 36/CS 0.8ML: Brand: MEDLINE

## (undated) DEVICE — DOUBLE BASIN SET: Brand: MEDLINE INDUSTRIES, INC.

## (undated) DEVICE — DRAPE,LAP,CHOLE,W/TROUGHS,STERILE: Brand: MEDLINE

## (undated) DEVICE — SPONGE GZ W4XL4IN RAYON POLY CVR W/NONWOVEN FAB STRL 2/PK

## (undated) DEVICE — MONOPOLAR CURVED SCISSORS: Brand: ENDOWRIST

## (undated) DEVICE — COLUMN DRAPE

## (undated) DEVICE — BLADE CLIPPER GEN PURP NS

## (undated) DEVICE — ELECTRODE PT RET AD L9FT HI MOIST COND ADH HYDRGEL CORDED

## (undated) DEVICE — INSUFFLATION TUBING SET WITH FILTER, FUNNEL CONNECTOR AND LUER LOCK: Brand: JOSNOE MEDICAL INC